# Patient Record
Sex: FEMALE | Race: WHITE | Employment: UNEMPLOYED | URBAN - METROPOLITAN AREA
[De-identification: names, ages, dates, MRNs, and addresses within clinical notes are randomized per-mention and may not be internally consistent; named-entity substitution may affect disease eponyms.]

---

## 2018-07-07 ENCOUNTER — HOSPITAL ENCOUNTER (EMERGENCY)
Age: 63
Discharge: HOME | End: 2018-07-07
Payer: COMMERCIAL

## 2018-07-07 ENCOUNTER — APPOINTMENT (OUTPATIENT)
Dept: CT IMAGING | Age: 63
End: 2018-07-07
Payer: COMMERCIAL

## 2018-07-07 VITALS
HEART RATE: 100 BPM | RESPIRATION RATE: 14 BRPM | TEMPERATURE: 98.5 F | SYSTOLIC BLOOD PRESSURE: 149 MMHG | DIASTOLIC BLOOD PRESSURE: 83 MMHG | HEIGHT: 62 IN | BODY MASS INDEX: 30.36 KG/M2 | WEIGHT: 165 LBS | OXYGEN SATURATION: 98 %

## 2018-07-07 DIAGNOSIS — S01.01XA LACERATION OF SCALP WITHOUT FOREIGN BODY, INITIAL ENCOUNTER: ICD-10-CM

## 2018-07-07 DIAGNOSIS — S09.90XA INJURY OF HEAD, INITIAL ENCOUNTER: Primary | ICD-10-CM

## 2018-07-07 PROCEDURE — 99283 EMERGENCY DEPT VISIT LOW MDM: CPT

## 2018-07-07 PROCEDURE — 6370000000 HC RX 637 (ALT 250 FOR IP): Performed by: NURSE PRACTITIONER

## 2018-07-07 PROCEDURE — 12001 RPR S/N/AX/GEN/TRNK 2.5CM/<: CPT

## 2018-07-07 PROCEDURE — 72125 CT NECK SPINE W/O DYE: CPT

## 2018-07-07 PROCEDURE — 90715 TDAP VACCINE 7 YRS/> IM: CPT | Performed by: NURSE PRACTITIONER

## 2018-07-07 PROCEDURE — 70450 CT HEAD/BRAIN W/O DYE: CPT

## 2018-07-07 PROCEDURE — 6360000002 HC RX W HCPCS: Performed by: NURSE PRACTITIONER

## 2018-07-07 PROCEDURE — 90471 IMMUNIZATION ADMIN: CPT | Performed by: NURSE PRACTITIONER

## 2018-07-07 RX ORDER — ONDANSETRON 4 MG/1
4 TABLET, ORALLY DISINTEGRATING ORAL EVERY 8 HOURS PRN
Qty: 24 TABLET | Refills: 0 | Status: SHIPPED | OUTPATIENT
Start: 2018-07-07 | End: 2019-08-29

## 2018-07-07 RX ORDER — OXYCODONE HYDROCHLORIDE AND ACETAMINOPHEN 5; 325 MG/1; MG/1
1 TABLET ORAL EVERY 6 HOURS PRN
Qty: 6 TABLET | Refills: 0 | Status: SHIPPED | OUTPATIENT
Start: 2018-07-07 | End: 2018-07-10

## 2018-07-07 RX ORDER — ONDANSETRON 4 MG/1
8 TABLET, ORALLY DISINTEGRATING ORAL ONCE
Status: COMPLETED | OUTPATIENT
Start: 2018-07-07 | End: 2018-07-07

## 2018-07-07 RX ORDER — CEPHALEXIN 500 MG/1
500 CAPSULE ORAL 4 TIMES DAILY
Qty: 40 CAPSULE | Refills: 0 | Status: SHIPPED | OUTPATIENT
Start: 2018-07-07 | End: 2020-05-09

## 2018-07-07 RX ORDER — OXYCODONE HYDROCHLORIDE AND ACETAMINOPHEN 5; 325 MG/1; MG/1
1 TABLET ORAL ONCE
Status: COMPLETED | OUTPATIENT
Start: 2018-07-07 | End: 2018-07-07

## 2018-07-07 RX ORDER — GINSENG 100 MG
CAPSULE ORAL ONCE
Status: COMPLETED | OUTPATIENT
Start: 2018-07-07 | End: 2018-07-07

## 2018-07-07 RX ORDER — CEPHALEXIN 500 MG/1
500 CAPSULE ORAL ONCE
Status: COMPLETED | OUTPATIENT
Start: 2018-07-07 | End: 2018-07-07

## 2018-07-07 RX ADMIN — BACITRACIN: 500 OINTMENT TOPICAL at 20:19

## 2018-07-07 RX ADMIN — TETANUS TOXOID, REDUCED DIPHTHERIA TOXOID AND ACELLULAR PERTUSSIS VACCINE, ADSORBED 0.5 ML: 5; 2.5; 8; 8; 2.5 SUSPENSION INTRAMUSCULAR at 20:20

## 2018-07-07 RX ADMIN — ONDANSETRON 8 MG: 4 TABLET, ORALLY DISINTEGRATING ORAL at 20:19

## 2018-07-07 RX ADMIN — CEPHALEXIN 500 MG: 500 CAPSULE ORAL at 20:19

## 2018-07-07 RX ADMIN — OXYCODONE HYDROCHLORIDE AND ACETAMINOPHEN 1 TABLET: 5; 325 TABLET ORAL at 20:19

## 2018-07-07 ASSESSMENT — PAIN SCALES - GENERAL
PAINLEVEL_OUTOF10: 4
PAINLEVEL_OUTOF10: 2
PAINLEVEL_OUTOF10: 2

## 2018-07-07 ASSESSMENT — PAIN DESCRIPTION - PAIN TYPE: TYPE: ACUTE PAIN

## 2018-07-07 ASSESSMENT — PAIN DESCRIPTION - LOCATION: LOCATION: HEAD

## 2018-07-08 NOTE — ED PROVIDER NOTES
------------------------- NURSING NOTES AND VITALS REVIEWED ---------------------------   The nursing notes within the ED encounter and vital signs as below have been reviewed. BP (!) 149/83   Pulse 100   Temp 98.5 °F (36.9 °C) (Oral)   Resp 14   Ht 5' 2\" (1.575 m)   Wt 165 lb (74.8 kg)   SpO2 98%   BMI 30.18 kg/m²   Oxygen Saturation Interpretation: Normal      ---------------------------------------------------PHYSICAL EXAM--------------------------------------      Constitutional/General: Alert and oriented x3, well appearing, non toxic in NAD  Head: Normocephalic and atraumatic  Eyes: PERRL, EOMI  Mouth: Oropharynx clear, handling secretions, no trismus  Neck: Supple, full ROM,   Pulmonary: Lungs clear to auscultation bilaterally, no wheezes, rales, or rhonchi. Not in respiratory distress  Cardiovascular:  Regular rate and rhythm, no murmurs, gallops, or rubs. 2+ distal pulses  Abdomen: Soft, non tender, non distended,   Extremities: Moves all extremities x 4. Warm and well perfused  Skin: warm and dry without rash, patient with 1 cm laceration to the very top of scalp. Aggressive wound care completed by nursing staff. Neurologic: GCS 15, cranial nerves II through XII grossly intact. No acute neurovascular deficit noted. Speech clear and coherent strength strong and equal bilaterally  Psych: Normal Affect    PROCEDURE NOTE  7/7/18       Time: 2130    LACERATION REPAIR  Risks, benefits and alternatives (for applicable procedures below) described. Performed By: TAMARA Rincon - CNP. Laceration #: 1. Location: Top of scalp  Length: 1cm. The wound area was irrigated with sterile saline, cleansend with shur-clens and draped in a sterile fashion. Local Anesthesia:  not required. The wound was explored with the following results:  No foreign bodies found, no foreign body or tendon injury seen. Debridement: partial thickness and None. Undermining: partial thickness and None.   Wound answered at this time and they are agreeable with the plan.      --------------------------------- IMPRESSION AND DISPOSITION ---------------------------------    IMPRESSION  1. Injury of head, initial encounter    2. Laceration of scalp without foreign body, initial encounter        DISPOSITION  Disposition: Discharge to home  Patient condition is good      NOTE: This report was transcribed using voice recognition software.  Every effort was made to ensure accuracy; however, inadvertent computerized transcription errors may be present     Yousuf Ang, TAMARA - YONI  07/08/18 [id number]

## 2019-01-16 ENCOUNTER — HOSPITAL ENCOUNTER (EMERGENCY)
Age: 64
Discharge: HOME | End: 2019-01-16
Attending: EMERGENCY MEDICINE
Payer: COMMERCIAL

## 2019-01-16 VITALS
HEART RATE: 84 BPM | BODY MASS INDEX: 30.36 KG/M2 | HEIGHT: 62 IN | RESPIRATION RATE: 16 BRPM | WEIGHT: 165 LBS | SYSTOLIC BLOOD PRESSURE: 131 MMHG | TEMPERATURE: 98.1 F | DIASTOLIC BLOOD PRESSURE: 59 MMHG | OXYGEN SATURATION: 99 %

## 2019-01-16 DIAGNOSIS — R07.9 CHEST PAIN, UNSPECIFIED TYPE: ICD-10-CM

## 2019-01-16 DIAGNOSIS — R11.2 NON-INTRACTABLE VOMITING WITH NAUSEA, UNSPECIFIED VOMITING TYPE: Primary | ICD-10-CM

## 2019-01-16 LAB
ALBUMIN SERPL-MCNC: 4.7 G/DL (ref 3.5–5.2)
ALP BLD-CCNC: 73 U/L (ref 35–104)
ALT SERPL-CCNC: 14 U/L (ref 0–32)
ANION GAP SERPL CALCULATED.3IONS-SCNC: 13 MMOL/L (ref 7–16)
AST SERPL-CCNC: 15 U/L (ref 0–31)
BACTERIA: NORMAL /HPF
BASOPHILS ABSOLUTE: 0.04 E9/L (ref 0–0.2)
BASOPHILS RELATIVE PERCENT: 0.4 % (ref 0–2)
BILIRUB SERPL-MCNC: 0.8 MG/DL (ref 0–1.2)
BILIRUBIN URINE: NEGATIVE
BLOOD, URINE: NORMAL
BUN BLDV-MCNC: 13 MG/DL (ref 8–23)
CALCIUM SERPL-MCNC: 10 MG/DL (ref 8.6–10.2)
CHLORIDE BLD-SCNC: 104 MMOL/L (ref 98–107)
CLARITY: CLEAR
CO2: 23 MMOL/L (ref 22–29)
COLOR: YELLOW
CREAT SERPL-MCNC: 0.7 MG/DL (ref 0.5–1)
EKG ATRIAL RATE: 90 BPM
EKG P AXIS: 41 DEGREES
EKG P-R INTERVAL: 162 MS
EKG Q-T INTERVAL: 392 MS
EKG QRS DURATION: 82 MS
EKG QTC CALCULATION (BAZETT): 479 MS
EKG R AXIS: 35 DEGREES
EKG T AXIS: 19 DEGREES
EKG VENTRICULAR RATE: 90 BPM
EOSINOPHILS ABSOLUTE: 0.05 E9/L (ref 0.05–0.5)
EOSINOPHILS RELATIVE PERCENT: 0.5 % (ref 0–6)
GFR AFRICAN AMERICAN: >60
GFR NON-AFRICAN AMERICAN: >60 ML/MIN/1.73
GLUCOSE BLD-MCNC: 75 MG/DL (ref 74–99)
GLUCOSE URINE: NEGATIVE MG/DL
HCT VFR BLD CALC: 52.5 % (ref 34–48)
HEMOGLOBIN: 17.6 G/DL (ref 11.5–15.5)
IMMATURE GRANULOCYTES #: 0.03 E9/L
IMMATURE GRANULOCYTES %: 0.3 % (ref 0–5)
KETONES, URINE: NEGATIVE MG/DL
LEUKOCYTE ESTERASE, URINE: NEGATIVE
LIPASE: 41 U/L (ref 13–60)
LYMPHOCYTES ABSOLUTE: 2.3 E9/L (ref 1.5–4)
LYMPHOCYTES RELATIVE PERCENT: 25.3 % (ref 20–42)
MCH RBC QN AUTO: 30.8 PG (ref 26–35)
MCHC RBC AUTO-ENTMCNC: 33.5 % (ref 32–34.5)
MCV RBC AUTO: 91.9 FL (ref 80–99.9)
MONOCYTES ABSOLUTE: 0.83 E9/L (ref 0.1–0.95)
MONOCYTES RELATIVE PERCENT: 9.1 % (ref 2–12)
NEUTROPHILS ABSOLUTE: 5.85 E9/L (ref 1.8–7.3)
NEUTROPHILS RELATIVE PERCENT: 64.4 % (ref 43–80)
NITRITE, URINE: NEGATIVE
PDW BLD-RTO: 11.9 FL (ref 11.5–15)
PH UA: 6 (ref 5–9)
PLATELET # BLD: 319 E9/L (ref 130–450)
PMV BLD AUTO: 9.5 FL (ref 7–12)
POTASSIUM SERPL-SCNC: 4.1 MMOL/L (ref 3.5–5)
PROTEIN UA: NEGATIVE MG/DL
RBC # BLD: 5.71 E12/L (ref 3.5–5.5)
RBC UA: NORMAL /HPF (ref 0–2)
RENAL EPITHELIAL, UA: NORMAL /HPF
SODIUM BLD-SCNC: 140 MMOL/L (ref 132–146)
SPECIFIC GRAVITY UA: <=1.005 (ref 1–1.03)
TOTAL PROTEIN: 7.9 G/DL (ref 6.4–8.3)
TROPONIN: <0.01 NG/ML (ref 0–0.03)
UROBILINOGEN, URINE: 0.2 E.U./DL
WBC # BLD: 9.1 E9/L (ref 4.5–11.5)
WBC UA: NORMAL /HPF (ref 0–5)

## 2019-01-16 PROCEDURE — 96375 TX/PRO/DX INJ NEW DRUG ADDON: CPT

## 2019-01-16 PROCEDURE — 83690 ASSAY OF LIPASE: CPT

## 2019-01-16 PROCEDURE — 85025 COMPLETE CBC W/AUTO DIFF WBC: CPT

## 2019-01-16 PROCEDURE — 2580000003 HC RX 258: Performed by: EMERGENCY MEDICINE

## 2019-01-16 PROCEDURE — 6370000000 HC RX 637 (ALT 250 FOR IP): Performed by: EMERGENCY MEDICINE

## 2019-01-16 PROCEDURE — 96374 THER/PROPH/DIAG INJ IV PUSH: CPT

## 2019-01-16 PROCEDURE — 99284 EMERGENCY DEPT VISIT MOD MDM: CPT

## 2019-01-16 PROCEDURE — 80053 COMPREHEN METABOLIC PANEL: CPT

## 2019-01-16 PROCEDURE — 6360000002 HC RX W HCPCS: Performed by: EMERGENCY MEDICINE

## 2019-01-16 PROCEDURE — C9113 INJ PANTOPRAZOLE SODIUM, VIA: HCPCS | Performed by: EMERGENCY MEDICINE

## 2019-01-16 PROCEDURE — 84484 ASSAY OF TROPONIN QUANT: CPT

## 2019-01-16 PROCEDURE — 81001 URINALYSIS AUTO W/SCOPE: CPT

## 2019-01-16 RX ORDER — DOCUSATE SODIUM 100 MG/1
100 CAPSULE, LIQUID FILLED ORAL 2 TIMES DAILY
Qty: 30 CAPSULE | Refills: 0 | Status: SHIPPED | OUTPATIENT
Start: 2019-01-16 | End: 2020-05-09

## 2019-01-16 RX ORDER — ONDANSETRON 2 MG/ML
4 INJECTION INTRAMUSCULAR; INTRAVENOUS ONCE
Status: COMPLETED | OUTPATIENT
Start: 2019-01-16 | End: 2019-01-16

## 2019-01-16 RX ORDER — METOCLOPRAMIDE 10 MG/1
10 TABLET ORAL 4 TIMES DAILY
Qty: 20 TABLET | Refills: 0 | Status: SHIPPED | OUTPATIENT
Start: 2019-01-16 | End: 2020-05-09

## 2019-01-16 RX ORDER — 0.9 % SODIUM CHLORIDE 0.9 %
1000 INTRAVENOUS SOLUTION INTRAVENOUS ONCE
Status: COMPLETED | OUTPATIENT
Start: 2019-01-16 | End: 2019-01-16

## 2019-01-16 RX ORDER — PANTOPRAZOLE SODIUM 40 MG/10ML
40 INJECTION, POWDER, LYOPHILIZED, FOR SOLUTION INTRAVENOUS ONCE
Status: COMPLETED | OUTPATIENT
Start: 2019-01-16 | End: 2019-01-16

## 2019-01-16 RX ORDER — ASPIRIN 81 MG/1
324 TABLET, CHEWABLE ORAL ONCE
Status: COMPLETED | OUTPATIENT
Start: 2019-01-16 | End: 2019-01-16

## 2019-01-16 RX ADMIN — ONDANSETRON 4 MG: 2 INJECTION INTRAMUSCULAR; INTRAVENOUS at 18:01

## 2019-01-16 RX ADMIN — ASPIRIN 81 MG 324 MG: 81 TABLET ORAL at 18:55

## 2019-01-16 RX ADMIN — SODIUM CHLORIDE 1000 ML: 9 INJECTION, SOLUTION INTRAVENOUS at 18:01

## 2019-01-16 RX ADMIN — PANTOPRAZOLE SODIUM 40 MG: 40 INJECTION, POWDER, FOR SOLUTION INTRAVENOUS at 18:01

## 2019-01-16 ASSESSMENT — ENCOUNTER SYMPTOMS
ABDOMINAL PAIN: 0
SHORTNESS OF BREATH: 0
BACK PAIN: 0
COUGH: 0
BLOOD IN STOOL: 0
NAUSEA: 1
VOMITING: 1

## 2019-03-02 ENCOUNTER — APPOINTMENT (OUTPATIENT)
Dept: GENERAL RADIOLOGY | Age: 64
End: 2019-03-02
Payer: COMMERCIAL

## 2019-03-02 ENCOUNTER — HOSPITAL ENCOUNTER (EMERGENCY)
Age: 64
Discharge: HOME | End: 2019-03-02
Attending: EMERGENCY MEDICINE
Payer: COMMERCIAL

## 2019-03-02 VITALS
TEMPERATURE: 98 F | HEIGHT: 62 IN | RESPIRATION RATE: 16 BRPM | WEIGHT: 155 LBS | DIASTOLIC BLOOD PRESSURE: 87 MMHG | BODY MASS INDEX: 28.52 KG/M2 | OXYGEN SATURATION: 100 % | SYSTOLIC BLOOD PRESSURE: 130 MMHG | HEART RATE: 78 BPM

## 2019-03-02 DIAGNOSIS — Z87.19 HISTORY OF HIATAL HERNIA: ICD-10-CM

## 2019-03-02 DIAGNOSIS — K21.9 GASTROESOPHAGEAL REFLUX DISEASE, ESOPHAGITIS PRESENCE NOT SPECIFIED: Primary | ICD-10-CM

## 2019-03-02 LAB
ALBUMIN SERPL-MCNC: 4.1 G/DL (ref 3.5–5.2)
ALP BLD-CCNC: 62 U/L (ref 35–104)
ALT SERPL-CCNC: 14 U/L (ref 0–32)
ANION GAP SERPL CALCULATED.3IONS-SCNC: 13 MMOL/L (ref 7–16)
AST SERPL-CCNC: 13 U/L (ref 0–31)
BASOPHILS ABSOLUTE: 0.05 E9/L (ref 0–0.2)
BASOPHILS RELATIVE PERCENT: 0.4 % (ref 0–2)
BILIRUB SERPL-MCNC: 0.5 MG/DL (ref 0–1.2)
BILIRUBIN DIRECT: <0.2 MG/DL (ref 0–0.3)
BILIRUBIN, INDIRECT: NORMAL MG/DL (ref 0–1)
BUN BLDV-MCNC: 12 MG/DL (ref 8–23)
CALCIUM SERPL-MCNC: 9.3 MG/DL (ref 8.6–10.2)
CHLORIDE BLD-SCNC: 104 MMOL/L (ref 98–107)
CO2: 22 MMOL/L (ref 22–29)
CREAT SERPL-MCNC: 0.7 MG/DL (ref 0.5–1)
EOSINOPHILS ABSOLUTE: 0.04 E9/L (ref 0.05–0.5)
EOSINOPHILS RELATIVE PERCENT: 0.4 % (ref 0–6)
GFR AFRICAN AMERICAN: >60
GFR NON-AFRICAN AMERICAN: >60 ML/MIN/1.73
GLUCOSE BLD-MCNC: 99 MG/DL (ref 74–99)
HCT VFR BLD CALC: 45.1 % (ref 34–48)
HEMOGLOBIN: 15.3 G/DL (ref 11.5–15.5)
IMMATURE GRANULOCYTES #: 0.04 E9/L
IMMATURE GRANULOCYTES %: 0.4 % (ref 0–5)
LIPASE: 35 U/L (ref 13–60)
LYMPHOCYTES ABSOLUTE: 1.69 E9/L (ref 1.5–4)
LYMPHOCYTES RELATIVE PERCENT: 15.1 % (ref 20–42)
MCH RBC QN AUTO: 31 PG (ref 26–35)
MCHC RBC AUTO-ENTMCNC: 33.9 % (ref 32–34.5)
MCV RBC AUTO: 91.5 FL (ref 80–99.9)
MONOCYTES ABSOLUTE: 0.65 E9/L (ref 0.1–0.95)
MONOCYTES RELATIVE PERCENT: 5.8 % (ref 2–12)
NEUTROPHILS ABSOLUTE: 8.74 E9/L (ref 1.8–7.3)
NEUTROPHILS RELATIVE PERCENT: 77.9 % (ref 43–80)
PDW BLD-RTO: 12.1 FL (ref 11.5–15)
PLATELET # BLD: 250 E9/L (ref 130–450)
PMV BLD AUTO: 9.1 FL (ref 7–12)
POTASSIUM SERPL-SCNC: 3.7 MMOL/L (ref 3.5–5)
RBC # BLD: 4.93 E12/L (ref 3.5–5.5)
SODIUM BLD-SCNC: 139 MMOL/L (ref 132–146)
TOTAL PROTEIN: 6.7 G/DL (ref 6.4–8.3)
TROPONIN: <0.01 NG/ML (ref 0–0.03)
WBC # BLD: 11.2 E9/L (ref 4.5–11.5)

## 2019-03-02 PROCEDURE — 6360000002 HC RX W HCPCS: Performed by: STUDENT IN AN ORGANIZED HEALTH CARE EDUCATION/TRAINING PROGRAM

## 2019-03-02 PROCEDURE — 96375 TX/PRO/DX INJ NEW DRUG ADDON: CPT

## 2019-03-02 PROCEDURE — 99284 EMERGENCY DEPT VISIT MOD MDM: CPT

## 2019-03-02 PROCEDURE — 84484 ASSAY OF TROPONIN QUANT: CPT

## 2019-03-02 PROCEDURE — 6370000000 HC RX 637 (ALT 250 FOR IP): Performed by: STUDENT IN AN ORGANIZED HEALTH CARE EDUCATION/TRAINING PROGRAM

## 2019-03-02 PROCEDURE — 96374 THER/PROPH/DIAG INJ IV PUSH: CPT

## 2019-03-02 PROCEDURE — 2580000003 HC RX 258: Performed by: STUDENT IN AN ORGANIZED HEALTH CARE EDUCATION/TRAINING PROGRAM

## 2019-03-02 PROCEDURE — 85025 COMPLETE CBC W/AUTO DIFF WBC: CPT

## 2019-03-02 PROCEDURE — 71045 X-RAY EXAM CHEST 1 VIEW: CPT

## 2019-03-02 PROCEDURE — 80048 BASIC METABOLIC PNL TOTAL CA: CPT

## 2019-03-02 PROCEDURE — 80076 HEPATIC FUNCTION PANEL: CPT

## 2019-03-02 PROCEDURE — 83690 ASSAY OF LIPASE: CPT

## 2019-03-02 RX ORDER — PROMETHAZINE HYDROCHLORIDE 25 MG/ML
12.5 INJECTION, SOLUTION INTRAMUSCULAR; INTRAVENOUS ONCE
Status: COMPLETED | OUTPATIENT
Start: 2019-03-02 | End: 2019-03-02

## 2019-03-02 RX ORDER — PROMETHAZINE HYDROCHLORIDE 25 MG/1
25 TABLET ORAL EVERY 6 HOURS PRN
Qty: 10 TABLET | Refills: 0 | Status: SHIPPED | OUTPATIENT
Start: 2019-03-02 | End: 2019-03-05

## 2019-03-02 RX ORDER — 0.9 % SODIUM CHLORIDE 0.9 %
1000 INTRAVENOUS SOLUTION INTRAVENOUS ONCE
Status: COMPLETED | OUTPATIENT
Start: 2019-03-02 | End: 2019-03-02

## 2019-03-02 RX ORDER — ONDANSETRON 2 MG/ML
8 INJECTION INTRAMUSCULAR; INTRAVENOUS ONCE
Status: COMPLETED | OUTPATIENT
Start: 2019-03-02 | End: 2019-03-02

## 2019-03-02 RX ADMIN — PROMETHAZINE HYDROCHLORIDE 12.5 MG: 25 INJECTION INTRAMUSCULAR; INTRAVENOUS at 19:50

## 2019-03-02 RX ADMIN — SODIUM CHLORIDE 1000 ML: 9 INJECTION, SOLUTION INTRAVENOUS at 18:33

## 2019-03-02 RX ADMIN — LIDOCAINE HYDROCHLORIDE: 20 SOLUTION ORAL; TOPICAL at 18:33

## 2019-03-02 RX ADMIN — ONDANSETRON 8 MG: 2 INJECTION INTRAMUSCULAR; INTRAVENOUS at 18:33

## 2019-03-03 ASSESSMENT — ENCOUNTER SYMPTOMS
SORE THROAT: 0
EYE PAIN: 0
NAUSEA: 1
DIARRHEA: 0
SHORTNESS OF BREATH: 0
SINUS PRESSURE: 0
BACK PAIN: 0
COUGH: 0
BLOOD IN STOOL: 0
ABDOMINAL DISTENTION: 0
VOMITING: 1
WHEEZING: 0
EYE REDNESS: 0
RHINORRHEA: 0
CONSTIPATION: 0
TROUBLE SWALLOWING: 0
ABDOMINAL PAIN: 1
CHEST TIGHTNESS: 0
PHOTOPHOBIA: 0

## 2019-03-10 LAB
EKG ATRIAL RATE: 83 BPM
EKG P AXIS: 41 DEGREES
EKG P-R INTERVAL: 166 MS
EKG Q-T INTERVAL: 384 MS
EKG QRS DURATION: 82 MS
EKG QTC CALCULATION (BAZETT): 451 MS
EKG R AXIS: 0 DEGREES
EKG T AXIS: 26 DEGREES
EKG VENTRICULAR RATE: 83 BPM

## 2019-05-07 ENCOUNTER — HOSPITAL ENCOUNTER (OUTPATIENT)
Age: 64
Discharge: HOME | End: 2019-05-07
Payer: COMMERCIAL

## 2019-05-07 PROCEDURE — 86003 ALLG SPEC IGE CRUDE XTRC EA: CPT

## 2019-05-19 LAB
Lab: NORMAL
REPORT: NORMAL
THIS TEST SENT TO: NORMAL

## 2019-07-12 ENCOUNTER — HOSPITAL ENCOUNTER (EMERGENCY)
Age: 64
Discharge: HOME | End: 2019-07-13
Attending: EMERGENCY MEDICINE
Payer: COMMERCIAL

## 2019-07-12 ENCOUNTER — APPOINTMENT (OUTPATIENT)
Dept: CT IMAGING | Age: 64
End: 2019-07-12
Payer: COMMERCIAL

## 2019-07-12 VITALS
HEART RATE: 99 BPM | RESPIRATION RATE: 16 BRPM | DIASTOLIC BLOOD PRESSURE: 66 MMHG | TEMPERATURE: 98.4 F | WEIGHT: 170 LBS | HEIGHT: 62 IN | SYSTOLIC BLOOD PRESSURE: 134 MMHG | OXYGEN SATURATION: 99 % | BODY MASS INDEX: 31.28 KG/M2

## 2019-07-12 DIAGNOSIS — R10.9 ABDOMINAL PAIN, UNSPECIFIED ABDOMINAL LOCATION: Primary | ICD-10-CM

## 2019-07-12 DIAGNOSIS — N83.201 CYST OF RIGHT OVARY: ICD-10-CM

## 2019-07-12 LAB
ALBUMIN SERPL-MCNC: 4.3 G/DL (ref 3.5–5.2)
ALP BLD-CCNC: 76 U/L (ref 35–104)
ALT SERPL-CCNC: 25 U/L (ref 0–32)
ANION GAP SERPL CALCULATED.3IONS-SCNC: 15 MMOL/L (ref 7–16)
AST SERPL-CCNC: 16 U/L (ref 0–31)
ATYPICAL LYMPHOCYTE RELATIVE PERCENT: 2 % (ref 0–4)
BASOPHILS ABSOLUTE: 0 E9/L (ref 0–0.2)
BASOPHILS RELATIVE PERCENT: 0 % (ref 0–2)
BILIRUB SERPL-MCNC: 0.8 MG/DL (ref 0–1.2)
BILIRUBIN URINE: NEGATIVE
BLOOD, URINE: NEGATIVE
BUN BLDV-MCNC: 10 MG/DL (ref 8–23)
CALCIUM SERPL-MCNC: 9.8 MG/DL (ref 8.6–10.2)
CHLORIDE BLD-SCNC: 102 MMOL/L (ref 98–107)
CLARITY: CLEAR
CO2: 21 MMOL/L (ref 22–29)
COLOR: YELLOW
CREAT SERPL-MCNC: 0.6 MG/DL (ref 0.5–1)
EOSINOPHILS ABSOLUTE: 0 E9/L (ref 0.05–0.5)
EOSINOPHILS RELATIVE PERCENT: 0 % (ref 0–6)
GFR AFRICAN AMERICAN: >60
GFR NON-AFRICAN AMERICAN: >60 ML/MIN/1.73
GLUCOSE BLD-MCNC: 104 MG/DL (ref 74–99)
GLUCOSE URINE: NEGATIVE MG/DL
HCT VFR BLD CALC: 45.6 % (ref 34–48)
HEMOGLOBIN: 15.3 G/DL (ref 11.5–15.5)
KETONES, URINE: 15 MG/DL
LEUKOCYTE ESTERASE, URINE: NEGATIVE
LIPASE: 25 U/L (ref 13–60)
LYMPHOCYTES ABSOLUTE: 0.58 E9/L (ref 1.5–4)
LYMPHOCYTES RELATIVE PERCENT: 4 % (ref 20–42)
MCH RBC QN AUTO: 31 PG (ref 26–35)
MCHC RBC AUTO-ENTMCNC: 33.6 % (ref 32–34.5)
MCV RBC AUTO: 92.5 FL (ref 80–99.9)
MONOCYTES ABSOLUTE: 0.1 E9/L (ref 0.1–0.95)
MONOCYTES RELATIVE PERCENT: 1 % (ref 2–12)
NEUTROPHILS ABSOLUTE: 9.02 E9/L (ref 1.8–7.3)
NEUTROPHILS RELATIVE PERCENT: 93 % (ref 43–80)
NITRITE, URINE: NEGATIVE
PDW BLD-RTO: 12.2 FL (ref 11.5–15)
PH UA: 7.5 (ref 5–9)
PLATELET # BLD: 227 E9/L (ref 130–450)
PMV BLD AUTO: 9.2 FL (ref 7–12)
POTASSIUM SERPL-SCNC: 3.7 MMOL/L (ref 3.5–5)
PROTEIN UA: NEGATIVE MG/DL
RBC # BLD: 4.93 E12/L (ref 3.5–5.5)
RBC # BLD: NORMAL 10*6/UL
SODIUM BLD-SCNC: 138 MMOL/L (ref 132–146)
SPECIFIC GRAVITY UA: 1.01 (ref 1–1.03)
TOTAL PROTEIN: 7.2 G/DL (ref 6.4–8.3)
TROPONIN: <0.01 NG/ML (ref 0–0.03)
UROBILINOGEN, URINE: 0.2 E.U./DL
WBC # BLD: 9.7 E9/L (ref 4.5–11.5)

## 2019-07-12 PROCEDURE — 99284 EMERGENCY DEPT VISIT MOD MDM: CPT

## 2019-07-12 PROCEDURE — 80053 COMPREHEN METABOLIC PANEL: CPT

## 2019-07-12 PROCEDURE — 6370000000 HC RX 637 (ALT 250 FOR IP): Performed by: EMERGENCY MEDICINE

## 2019-07-12 PROCEDURE — 85025 COMPLETE CBC W/AUTO DIFF WBC: CPT

## 2019-07-12 PROCEDURE — 81003 URINALYSIS AUTO W/O SCOPE: CPT

## 2019-07-12 PROCEDURE — 74177 CT ABD & PELVIS W/CONTRAST: CPT

## 2019-07-12 PROCEDURE — 83690 ASSAY OF LIPASE: CPT

## 2019-07-12 PROCEDURE — 84484 ASSAY OF TROPONIN QUANT: CPT

## 2019-07-12 PROCEDURE — 6360000002 HC RX W HCPCS: Performed by: EMERGENCY MEDICINE

## 2019-07-12 PROCEDURE — 6360000004 HC RX CONTRAST MEDICATION: Performed by: RADIOLOGY

## 2019-07-12 PROCEDURE — 2580000003 HC RX 258: Performed by: EMERGENCY MEDICINE

## 2019-07-12 PROCEDURE — 96374 THER/PROPH/DIAG INJ IV PUSH: CPT

## 2019-07-12 RX ORDER — SODIUM CHLORIDE 0.9 % (FLUSH) 0.9 %
SYRINGE (ML) INJECTION
Status: DISCONTINUED
Start: 2019-07-12 | End: 2019-07-13 | Stop reason: HOSPADM

## 2019-07-12 RX ORDER — ONDANSETRON 2 MG/ML
4 INJECTION INTRAMUSCULAR; INTRAVENOUS ONCE
Status: COMPLETED | OUTPATIENT
Start: 2019-07-12 | End: 2019-07-12

## 2019-07-12 RX ORDER — SODIUM CHLORIDE 0.9 % (FLUSH) 0.9 %
10 SYRINGE (ML) INJECTION ONCE
Status: DISCONTINUED | OUTPATIENT
Start: 2019-07-12 | End: 2019-07-13 | Stop reason: HOSPADM

## 2019-07-12 RX ORDER — 0.9 % SODIUM CHLORIDE 0.9 %
1000 INTRAVENOUS SOLUTION INTRAVENOUS ONCE
Status: COMPLETED | OUTPATIENT
Start: 2019-07-12 | End: 2019-07-12

## 2019-07-12 RX ORDER — LIDOCAINE HYDROCHLORIDE 20 MG/ML
SOLUTION OROPHARYNGEAL
Status: DISCONTINUED
Start: 2019-07-12 | End: 2019-07-13 | Stop reason: HOSPADM

## 2019-07-12 RX ORDER — MAGNESIUM HYDROXIDE/ALUMINUM HYDROXICE/SIMETHICONE 120; 1200; 1200 MG/30ML; MG/30ML; MG/30ML
SUSPENSION ORAL
Status: DISCONTINUED
Start: 2019-07-12 | End: 2019-07-13 | Stop reason: HOSPADM

## 2019-07-12 RX ADMIN — IOPAMIDOL 110 ML: 755 INJECTION, SOLUTION INTRAVENOUS at 22:39

## 2019-07-12 RX ADMIN — ONDANSETRON 4 MG: 2 INJECTION INTRAMUSCULAR; INTRAVENOUS at 21:41

## 2019-07-12 RX ADMIN — SODIUM CHLORIDE 1000 ML: 9 INJECTION, SOLUTION INTRAVENOUS at 21:40

## 2019-07-12 RX ADMIN — LIDOCAINE HYDROCHLORIDE: 20 SOLUTION ORAL; TOPICAL at 22:57

## 2019-07-12 ASSESSMENT — PAIN DESCRIPTION - PAIN TYPE: TYPE: ACUTE PAIN

## 2019-07-12 ASSESSMENT — PAIN SCALES - GENERAL: PAINLEVEL_OUTOF10: 7

## 2019-07-12 ASSESSMENT — PAIN DESCRIPTION - LOCATION: LOCATION: ABDOMEN

## 2019-07-12 ASSESSMENT — PAIN DESCRIPTION - FREQUENCY: FREQUENCY: INTERMITTENT

## 2019-07-12 ASSESSMENT — PAIN DESCRIPTION - DESCRIPTORS: DESCRIPTORS: ACHING

## 2019-07-13 RX ORDER — DIPHENHYDRAMINE HYDROCHLORIDE 50 MG/ML
INJECTION INTRAMUSCULAR; INTRAVENOUS
Status: DISCONTINUED
Start: 2019-07-13 | End: 2019-07-13 | Stop reason: HOSPADM

## 2019-07-13 RX ORDER — ONDANSETRON 2 MG/ML
INJECTION INTRAMUSCULAR; INTRAVENOUS
Status: DISCONTINUED
Start: 2019-07-13 | End: 2019-07-13 | Stop reason: HOSPADM

## 2019-07-13 RX ORDER — KETOROLAC TROMETHAMINE 30 MG/ML
INJECTION, SOLUTION INTRAMUSCULAR; INTRAVENOUS
Status: DISCONTINUED
Start: 2019-07-13 | End: 2019-07-13 | Stop reason: HOSPADM

## 2019-07-13 RX ORDER — PROCHLORPERAZINE EDISYLATE 5 MG/ML
INJECTION INTRAMUSCULAR; INTRAVENOUS
Status: DISCONTINUED
Start: 2019-07-13 | End: 2019-07-13 | Stop reason: HOSPADM

## 2019-07-13 NOTE — ED PROVIDER NOTES
DISPOSITION ---------------------------------    IMPRESSION  1. Abdominal pain, unspecified abdominal location Stable   2.  Cyst of right ovary New Problem       DISPOSITION  Disposition: Discharge to home  Patient condition is stable      Patient is to follow-up with Dr. Rola Mendez Monday morning            Ruben Merida MD  07/14/19 [id number]

## 2019-07-15 ENCOUNTER — HOSPITAL ENCOUNTER (OUTPATIENT)
Age: 64
Discharge: HOME | End: 2019-07-17
Payer: COMMERCIAL

## 2019-07-15 PROCEDURE — 86304 IMMUNOASSAY TUMOR CA 125: CPT

## 2019-07-15 PROCEDURE — 82378 CARCINOEMBRYONIC ANTIGEN: CPT

## 2019-07-15 PROCEDURE — 87624 HPV HI-RISK TYP POOLED RSLT: CPT

## 2019-07-15 PROCEDURE — G0123 SCREEN CERV/VAG THIN LAYER: HCPCS

## 2019-07-16 LAB
CA 125: 8.6 U/ML (ref 0–35)
CEA: 0.9 NG/ML (ref 0–5.2)

## 2019-07-19 LAB
HPV SAMPLE: NORMAL
HPV TYPE 16: NOT DETECTED
HPV TYPE 18: NOT DETECTED
HPV, HIGH RISK OTHER: NOT DETECTED
INTERPRETATION: NORMAL
SOURCE: NORMAL

## 2019-07-24 ENCOUNTER — HOSPITAL ENCOUNTER (OUTPATIENT)
Age: 64
Discharge: HOME | End: 2019-07-26
Payer: COMMERCIAL

## 2019-07-24 PROCEDURE — 88305 TISSUE EXAM BY PATHOLOGIST: CPT

## 2019-08-21 ENCOUNTER — HOSPITAL ENCOUNTER (OUTPATIENT)
Age: 64
Discharge: HOME | End: 2019-08-23

## 2019-08-21 PROCEDURE — 88307 TISSUE EXAM BY PATHOLOGIST: CPT

## 2019-08-21 PROCEDURE — 88305 TISSUE EXAM BY PATHOLOGIST: CPT

## 2019-08-29 ENCOUNTER — APPOINTMENT (OUTPATIENT)
Dept: CT IMAGING | Age: 64
End: 2019-08-29
Payer: COMMERCIAL

## 2019-08-29 ENCOUNTER — HOSPITAL ENCOUNTER (EMERGENCY)
Age: 64
Discharge: HOME | End: 2019-08-29
Attending: EMERGENCY MEDICINE
Payer: COMMERCIAL

## 2019-08-29 VITALS
HEART RATE: 76 BPM | OXYGEN SATURATION: 100 % | RESPIRATION RATE: 18 BRPM | SYSTOLIC BLOOD PRESSURE: 132 MMHG | TEMPERATURE: 98 F | DIASTOLIC BLOOD PRESSURE: 76 MMHG

## 2019-08-29 DIAGNOSIS — N28.89 KIDNEY MASS: ICD-10-CM

## 2019-08-29 DIAGNOSIS — L03.319 CELLULITIS OF TRUNK, UNSPECIFIED SITE OF TRUNK: Primary | ICD-10-CM

## 2019-08-29 DIAGNOSIS — K80.20 CALCULUS OF GALLBLADDER WITHOUT CHOLECYSTITIS WITHOUT OBSTRUCTION: ICD-10-CM

## 2019-08-29 LAB
ALBUMIN SERPL-MCNC: 4 G/DL (ref 3.5–5.2)
ALP BLD-CCNC: 83 U/L (ref 35–104)
ALT SERPL-CCNC: 41 U/L (ref 0–32)
ANION GAP SERPL CALCULATED.3IONS-SCNC: 11 MMOL/L (ref 7–16)
AST SERPL-CCNC: 16 U/L (ref 0–31)
BACTERIA: ABNORMAL /HPF
BASOPHILS ABSOLUTE: 0.02 E9/L (ref 0–0.2)
BASOPHILS RELATIVE PERCENT: 0.3 % (ref 0–2)
BILIRUB SERPL-MCNC: 0.7 MG/DL (ref 0–1.2)
BILIRUBIN URINE: NEGATIVE
BLOOD, URINE: ABNORMAL
BUN BLDV-MCNC: 9 MG/DL (ref 8–23)
CALCIUM SERPL-MCNC: 9.3 MG/DL (ref 8.6–10.2)
CHLORIDE BLD-SCNC: 110 MMOL/L (ref 98–107)
CLARITY: CLEAR
CO2: 23 MMOL/L (ref 22–29)
COLOR: YELLOW
CREAT SERPL-MCNC: 0.6 MG/DL (ref 0.5–1)
EOSINOPHILS ABSOLUTE: 0.06 E9/L (ref 0.05–0.5)
EOSINOPHILS RELATIVE PERCENT: 0.8 % (ref 0–6)
EPITHELIAL CELLS, UA: ABNORMAL /HPF
GFR AFRICAN AMERICAN: >60
GFR NON-AFRICAN AMERICAN: >60 ML/MIN/1.73
GLUCOSE BLD-MCNC: 89 MG/DL (ref 74–99)
GLUCOSE URINE: NEGATIVE MG/DL
HCT VFR BLD CALC: 44.5 % (ref 34–48)
HEMOGLOBIN: 14.8 G/DL (ref 11.5–15.5)
IMMATURE GRANULOCYTES #: 0.02 E9/L
IMMATURE GRANULOCYTES %: 0.3 % (ref 0–5)
KETONES, URINE: 15 MG/DL
LACTIC ACID, SEPSIS: 1.1 MMOL/L (ref 0.5–1.9)
LEUKOCYTE ESTERASE, URINE: ABNORMAL
LYMPHOCYTES ABSOLUTE: 1.79 E9/L (ref 1.5–4)
LYMPHOCYTES RELATIVE PERCENT: 24 % (ref 20–42)
MCH RBC QN AUTO: 30.8 PG (ref 26–35)
MCHC RBC AUTO-ENTMCNC: 33.3 % (ref 32–34.5)
MCV RBC AUTO: 92.5 FL (ref 80–99.9)
MONOCYTES ABSOLUTE: 0.52 E9/L (ref 0.1–0.95)
MONOCYTES RELATIVE PERCENT: 7 % (ref 2–12)
NEUTROPHILS ABSOLUTE: 5.05 E9/L (ref 1.8–7.3)
NEUTROPHILS RELATIVE PERCENT: 67.6 % (ref 43–80)
NITRITE, URINE: NEGATIVE
PDW BLD-RTO: 11.9 FL (ref 11.5–15)
PH UA: 7 (ref 5–9)
PLATELET # BLD: 258 E9/L (ref 130–450)
PMV BLD AUTO: 10 FL (ref 7–12)
POTASSIUM REFLEX MAGNESIUM: 4.5 MMOL/L (ref 3.5–5)
PROTEIN UA: NEGATIVE MG/DL
RBC # BLD: 4.81 E12/L (ref 3.5–5.5)
RBC UA: ABNORMAL /HPF (ref 0–2)
REASON FOR REJECTION: NORMAL
REJECTED TEST: NORMAL
SODIUM BLD-SCNC: 144 MMOL/L (ref 132–146)
SPECIFIC GRAVITY UA: 1.01 (ref 1–1.03)
TOTAL PROTEIN: 6.8 G/DL (ref 6.4–8.3)
UROBILINOGEN, URINE: 1 E.U./DL
WBC # BLD: 7.5 E9/L (ref 4.5–11.5)
WBC UA: ABNORMAL /HPF (ref 0–5)

## 2019-08-29 PROCEDURE — 81001 URINALYSIS AUTO W/SCOPE: CPT

## 2019-08-29 PROCEDURE — 85025 COMPLETE CBC W/AUTO DIFF WBC: CPT

## 2019-08-29 PROCEDURE — 96374 THER/PROPH/DIAG INJ IV PUSH: CPT

## 2019-08-29 PROCEDURE — 74177 CT ABD & PELVIS W/CONTRAST: CPT

## 2019-08-29 PROCEDURE — 83605 ASSAY OF LACTIC ACID: CPT

## 2019-08-29 PROCEDURE — 2580000003 HC RX 258: Performed by: STUDENT IN AN ORGANIZED HEALTH CARE EDUCATION/TRAINING PROGRAM

## 2019-08-29 PROCEDURE — 99284 EMERGENCY DEPT VISIT MOD MDM: CPT

## 2019-08-29 PROCEDURE — 2580000003 HC RX 258

## 2019-08-29 PROCEDURE — 80053 COMPREHEN METABOLIC PANEL: CPT

## 2019-08-29 PROCEDURE — 6360000004 HC RX CONTRAST MEDICATION: Performed by: RADIOLOGY

## 2019-08-29 PROCEDURE — 6360000002 HC RX W HCPCS: Performed by: STUDENT IN AN ORGANIZED HEALTH CARE EDUCATION/TRAINING PROGRAM

## 2019-08-29 RX ORDER — ONDANSETRON 4 MG/1
4 TABLET, ORALLY DISINTEGRATING ORAL EVERY 8 HOURS PRN
Qty: 12 TABLET | Refills: 0 | Status: ON HOLD | OUTPATIENT
Start: 2019-08-29 | End: 2020-05-15 | Stop reason: HOSPADM

## 2019-08-29 RX ORDER — SODIUM CHLORIDE 0.9 % (FLUSH) 0.9 %
SYRINGE (ML) INJECTION
Status: COMPLETED
Start: 2019-08-29 | End: 2019-08-29

## 2019-08-29 RX ORDER — 0.9 % SODIUM CHLORIDE 0.9 %
1000 INTRAVENOUS SOLUTION INTRAVENOUS ONCE
Status: COMPLETED | OUTPATIENT
Start: 2019-08-29 | End: 2019-08-29

## 2019-08-29 RX ORDER — CLINDAMYCIN HYDROCHLORIDE 300 MG/1
300 CAPSULE ORAL 3 TIMES DAILY
Qty: 21 CAPSULE | Refills: 0 | Status: SHIPPED | OUTPATIENT
Start: 2019-08-29 | End: 2019-09-05

## 2019-08-29 RX ORDER — ONDANSETRON 2 MG/ML
4 INJECTION INTRAMUSCULAR; INTRAVENOUS ONCE
Status: COMPLETED | OUTPATIENT
Start: 2019-08-29 | End: 2019-08-29

## 2019-08-29 RX ADMIN — SODIUM CHLORIDE 1000 ML: 9 INJECTION, SOLUTION INTRAVENOUS at 15:01

## 2019-08-29 RX ADMIN — Medication 10 ML: at 15:01

## 2019-08-29 RX ADMIN — ONDANSETRON 4 MG: 2 INJECTION INTRAMUSCULAR; INTRAVENOUS at 15:01

## 2019-08-29 RX ADMIN — IOPAMIDOL 110 ML: 755 INJECTION, SOLUTION INTRAVENOUS at 17:26

## 2019-08-29 ASSESSMENT — ENCOUNTER SYMPTOMS
DIARRHEA: 0
ABDOMINAL PAIN: 0
CONSTIPATION: 0
VOMITING: 1
EYE REDNESS: 0
SINUS PAIN: 0
SORE THROAT: 0
EYE PAIN: 0
SHORTNESS OF BREATH: 0
COUGH: 0
NAUSEA: 1

## 2019-08-29 ASSESSMENT — PAIN DESCRIPTION - FREQUENCY: FREQUENCY: CONTINUOUS

## 2019-08-29 ASSESSMENT — PAIN DESCRIPTION - PAIN TYPE: TYPE: ACUTE PAIN

## 2019-08-29 ASSESSMENT — PAIN DESCRIPTION - LOCATION: LOCATION: ABDOMEN

## 2019-08-29 ASSESSMENT — PAIN SCALES - GENERAL: PAINLEVEL_OUTOF10: 4

## 2019-08-29 ASSESSMENT — PAIN DESCRIPTION - DESCRIPTORS: DESCRIPTORS: TENDER

## 2019-08-29 NOTE — ED PROVIDER NOTES
Patient is a 37-year-old female presenting with abdominal pain, emesis, nausea. Patient states that on 21 August she had a right ovarian cyst removed via laparoscopy. Patient states she for the last 3 to 4 days has had nausea and vomiting. No blood in the vomit. She denies any abdominal pain but describes a abdominal discomfort over the right lower quadrant incision. Patient vomited last night at midnight. She denies any fever chills, bloody or tarry stools. She has not taken anything for symptoms and her symptoms are not made worse by anything. She is unsure when the erythema started on her right lower quadrant incision. Review of Systems   Constitutional: Negative for chills and fever. HENT: Negative for congestion, sinus pain and sore throat. Eyes: Negative for pain and redness. Respiratory: Negative for cough and shortness of breath. Cardiovascular: Negative for chest pain and palpitations. Gastrointestinal: Positive for nausea and vomiting. Negative for abdominal pain, constipation and diarrhea. Endocrine: Negative for polyuria. Genitourinary: Negative for difficulty urinating, dysuria, frequency and hematuria. Musculoskeletal: Negative for neck pain. Skin: Negative. Neurological: Negative for dizziness, weakness, light-headedness and headaches. Hematological: Negative. Psychiatric/Behavioral: Negative for agitation and confusion. Physical Exam   Constitutional: She appears well-developed and well-nourished. HENT:   Head: Normocephalic and atraumatic. Eyes: Pupils are equal, round, and reactive to light. Conjunctivae and EOM are normal.   Neck: Normal range of motion. No JVD present. Cardiovascular: Normal rate, regular rhythm, normal heart sounds and intact distal pulses. Pulmonary/Chest: Effort normal and breath sounds normal. No respiratory distress. She has no wheezes. Abdominal: Soft.  Bowel sounds are normal. She exhibits no distension and no mass. There is no tenderness. There is no guarding. Musculoskeletal: She exhibits no edema. Skin: Skin is warm and dry. Psychiatric: She has a normal mood and affect. Nursing note and vitals reviewed. Procedures     MDM     ED Course as of Aug 30 0144   Thu Aug 29, 2019   1819 1. Abdominal wall hematoma to the right of the midline. 2. Trace free fluid in the pelvis which may relate to previous  surgery. The appearance is not suggestive of abscess. 3. Very small solid lesion at the lower pole the right kidney which is  quite suspicious for neoplasm. Consider urologic surgery consultation. 4. Stable enhancing lesion in the right hepatic lobe which may  represent a benign hemangioma. 5. Cholelithiasis. 6. Sizable hiatus hernia. 2 probable duodenal diverticuli.    [WL]      ED Course User Index  [WL] Elen Gill DO      Patient presents to the ED for   Chief Complaint   Patient presents with    Abdominal Pain     since hysteretomy on 21st    Emesis     mucus    Nausea   . Chart review was performed. Workup in the ED revealed cellulitis of the trunk, kidney mass, cholelithiasis. Lab work was unremarkable, CT of abdomen and pelvis showed a kidney mass and cholelithiasis. Patient cellulitis was demonstrated clinically with erythema in her right lower quadrant of her abdomen. She was sent home with a prescription for clindamycin and Zofran and advised to follow-up with her OB/GYN  Patient continues to be non-toxic on re-evaluation. Findings were discussed with the patient and reasons to immediately return to the ED were articulated to them. They will follow-up with their OBGYN. ED Course as of Aug 30 0144   Thu Aug 29, 2019   1819 1. Abdominal wall hematoma to the right of the midline. 2. Trace free fluid in the pelvis which may relate to previous  surgery. The appearance is not suggestive of abscess.   3. Very small solid lesion at the lower pole the right kidney which - 0.50 E9/L    Basophils Absolute 0.02 0.00 - 0.20 E9/L   Lactate, Sepsis   Result Value Ref Range    Lactic Acid, Sepsis 1.1 0.5 - 1.9 mmol/L   Urinalysis with Microscopic   Result Value Ref Range    Color, UA Yellow Straw/Yellow    Clarity, UA Clear Clear    Glucose, Ur Negative Negative mg/dL    Bilirubin Urine Negative Negative    Ketones, Urine 15 (A) Negative mg/dL    Specific Gravity, UA 1.010 1.005 - 1.030    Blood, Urine TRACE-INTACT Negative    pH, UA 7.0 5.0 - 9.0    Protein, UA Negative Negative mg/dL    Urobilinogen, Urine 1.0 <2.0 E.U./dL    Nitrite, Urine Negative Negative    Leukocyte Esterase, Urine SMALL (A) Negative    WBC, UA 2-5 0 - 5 /HPF    RBC, UA NONE 0 - 2 /HPF    Epi Cells FEW /HPF    Bacteria, UA NONE /HPF   SPECIMEN REJECTION   Result Value Ref Range    Rejected Test CMPX     Reason for Rejection see below    Comprehensive Metabolic Panel w/ Reflex to MG   Result Value Ref Range    Sodium 144 132 - 146 mmol/L    Potassium reflex Magnesium 4.5 3.5 - 5.0 mmol/L    Chloride 110 (H) 98 - 107 mmol/L    CO2 23 22 - 29 mmol/L    Anion Gap 11 7 - 16 mmol/L    Glucose 89 74 - 99 mg/dL    BUN 9 8 - 23 mg/dL    CREATININE 0.6 0.5 - 1.0 mg/dL    GFR Non-African American >60 >=60 mL/min/1.73    GFR African American >60     Calcium 9.3 8.6 - 10.2 mg/dL    Total Protein 6.8 6.4 - 8.3 g/dL    Alb 4.0 3.5 - 5.2 g/dL    Total Bilirubin 0.7 0.0 - 1.2 mg/dL    Alkaline Phosphatase 83 35 - 104 U/L    ALT 41 (H) 0 - 32 U/L    AST 16 0 - 31 U/L       Radiology:  CT ABDOMEN PELVIS W IV CONTRAST Additional Contrast? None   Final Result      1. Abdominal wall hematoma to the right of the midline. 2. Trace free fluid in the pelvis which may relate to previous   surgery. The appearance is not suggestive of abscess. 3. Very small solid lesion at the lower pole the right kidney which is   quite suspicious for neoplasm. Consider urologic surgery consultation.    4. Stable enhancing lesion in the right hepatic lobe

## 2019-12-10 ENCOUNTER — HOSPITAL ENCOUNTER (EMERGENCY)
Age: 64
Discharge: HOME | End: 2019-12-10
Attending: EMERGENCY MEDICINE
Payer: COMMERCIAL

## 2019-12-10 ENCOUNTER — APPOINTMENT (OUTPATIENT)
Dept: GENERAL RADIOLOGY | Age: 64
End: 2019-12-10
Payer: COMMERCIAL

## 2019-12-10 ENCOUNTER — APPOINTMENT (OUTPATIENT)
Dept: CT IMAGING | Age: 64
End: 2019-12-10
Payer: COMMERCIAL

## 2019-12-10 VITALS
DIASTOLIC BLOOD PRESSURE: 62 MMHG | RESPIRATION RATE: 16 BRPM | HEART RATE: 82 BPM | BODY MASS INDEX: 29.44 KG/M2 | HEIGHT: 62 IN | WEIGHT: 160 LBS | OXYGEN SATURATION: 99 % | TEMPERATURE: 98 F | SYSTOLIC BLOOD PRESSURE: 127 MMHG

## 2019-12-10 DIAGNOSIS — K44.9 HIATAL HERNIA: ICD-10-CM

## 2019-12-10 DIAGNOSIS — R07.9 CHEST PAIN, UNSPECIFIED TYPE: ICD-10-CM

## 2019-12-10 DIAGNOSIS — R10.13 EPIGASTRIC ABDOMINAL PAIN: Primary | ICD-10-CM

## 2019-12-10 LAB
ALBUMIN SERPL-MCNC: 3.7 G/DL (ref 3.5–5.2)
ALP BLD-CCNC: 62 U/L (ref 35–104)
ALT SERPL-CCNC: 14 U/L (ref 0–32)
ANION GAP SERPL CALCULATED.3IONS-SCNC: 10 MMOL/L (ref 7–16)
AST SERPL-CCNC: 12 U/L (ref 0–31)
BASOPHILS ABSOLUTE: 0.02 E9/L (ref 0–0.2)
BASOPHILS RELATIVE PERCENT: 0.3 % (ref 0–2)
BILIRUB SERPL-MCNC: 0.7 MG/DL (ref 0–1.2)
BUN BLDV-MCNC: 13 MG/DL (ref 8–23)
CALCIUM SERPL-MCNC: 8.7 MG/DL (ref 8.6–10.2)
CHLORIDE BLD-SCNC: 108 MMOL/L (ref 98–107)
CO2: 22 MMOL/L (ref 22–29)
CREAT SERPL-MCNC: 0.7 MG/DL (ref 0.5–1)
EKG ATRIAL RATE: 97 BPM
EKG P AXIS: 44 DEGREES
EKG P-R INTERVAL: 148 MS
EKG Q-T INTERVAL: 366 MS
EKG QRS DURATION: 86 MS
EKG QTC CALCULATION (BAZETT): 464 MS
EKG R AXIS: 84 DEGREES
EKG T AXIS: 24 DEGREES
EKG VENTRICULAR RATE: 97 BPM
EOSINOPHILS ABSOLUTE: 0.06 E9/L (ref 0.05–0.5)
EOSINOPHILS RELATIVE PERCENT: 0.9 % (ref 0–6)
GFR AFRICAN AMERICAN: >60
GFR NON-AFRICAN AMERICAN: >60 ML/MIN/1.73
GLUCOSE BLD-MCNC: 97 MG/DL (ref 74–99)
HCT VFR BLD CALC: 49.2 % (ref 34–48)
HEMOGLOBIN: 16.4 G/DL (ref 11.5–15.5)
IMMATURE GRANULOCYTES #: 0.01 E9/L
IMMATURE GRANULOCYTES %: 0.2 % (ref 0–5)
LACTIC ACID: 1.8 MMOL/L (ref 0.5–2.2)
LIPASE: 24 U/L (ref 13–60)
LYMPHOCYTES ABSOLUTE: 0.85 E9/L (ref 1.5–4)
LYMPHOCYTES RELATIVE PERCENT: 13.4 % (ref 20–42)
MCH RBC QN AUTO: 30.8 PG (ref 26–35)
MCHC RBC AUTO-ENTMCNC: 33.3 % (ref 32–34.5)
MCV RBC AUTO: 92.5 FL (ref 80–99.9)
MONOCYTES ABSOLUTE: 0.46 E9/L (ref 0.1–0.95)
MONOCYTES RELATIVE PERCENT: 7.3 % (ref 2–12)
NEUTROPHILS ABSOLUTE: 4.92 E9/L (ref 1.8–7.3)
NEUTROPHILS RELATIVE PERCENT: 77.9 % (ref 43–80)
PDW BLD-RTO: 12.2 FL (ref 11.5–15)
PLATELET # BLD: 248 E9/L (ref 130–450)
PMV BLD AUTO: 9.9 FL (ref 7–12)
POTASSIUM REFLEX MAGNESIUM: 3.7 MMOL/L (ref 3.5–5)
RBC # BLD: 5.32 E12/L (ref 3.5–5.5)
REASON FOR REJECTION: NORMAL
REJECTED TEST: NORMAL
SODIUM BLD-SCNC: 140 MMOL/L (ref 132–146)
TOTAL PROTEIN: 6 G/DL (ref 6.4–8.3)
TROPONIN: <0.01 NG/ML (ref 0–0.03)
WBC # BLD: 6.3 E9/L (ref 4.5–11.5)

## 2019-12-10 PROCEDURE — 96375 TX/PRO/DX INJ NEW DRUG ADDON: CPT

## 2019-12-10 PROCEDURE — 96374 THER/PROPH/DIAG INJ IV PUSH: CPT

## 2019-12-10 PROCEDURE — 93005 ELECTROCARDIOGRAM TRACING: CPT | Performed by: STUDENT IN AN ORGANIZED HEALTH CARE EDUCATION/TRAINING PROGRAM

## 2019-12-10 PROCEDURE — 6370000000 HC RX 637 (ALT 250 FOR IP): Performed by: STUDENT IN AN ORGANIZED HEALTH CARE EDUCATION/TRAINING PROGRAM

## 2019-12-10 PROCEDURE — 83605 ASSAY OF LACTIC ACID: CPT

## 2019-12-10 PROCEDURE — 6360000002 HC RX W HCPCS: Performed by: STUDENT IN AN ORGANIZED HEALTH CARE EDUCATION/TRAINING PROGRAM

## 2019-12-10 PROCEDURE — 85025 COMPLETE CBC W/AUTO DIFF WBC: CPT

## 2019-12-10 PROCEDURE — 80053 COMPREHEN METABOLIC PANEL: CPT

## 2019-12-10 PROCEDURE — 83690 ASSAY OF LIPASE: CPT

## 2019-12-10 PROCEDURE — 99285 EMERGENCY DEPT VISIT HI MDM: CPT

## 2019-12-10 PROCEDURE — 2580000003 HC RX 258: Performed by: STUDENT IN AN ORGANIZED HEALTH CARE EDUCATION/TRAINING PROGRAM

## 2019-12-10 PROCEDURE — C9113 INJ PANTOPRAZOLE SODIUM, VIA: HCPCS | Performed by: STUDENT IN AN ORGANIZED HEALTH CARE EDUCATION/TRAINING PROGRAM

## 2019-12-10 PROCEDURE — 71046 X-RAY EXAM CHEST 2 VIEWS: CPT

## 2019-12-10 PROCEDURE — 84484 ASSAY OF TROPONIN QUANT: CPT

## 2019-12-10 RX ORDER — PANTOPRAZOLE SODIUM 40 MG/10ML
40 INJECTION, POWDER, LYOPHILIZED, FOR SOLUTION INTRAVENOUS ONCE
Status: COMPLETED | OUTPATIENT
Start: 2019-12-10 | End: 2019-12-10

## 2019-12-10 RX ORDER — PANTOPRAZOLE SODIUM 40 MG/1
40 TABLET, DELAYED RELEASE ORAL DAILY
COMMUNITY
End: 2019-12-10 | Stop reason: ALTCHOICE

## 2019-12-10 RX ORDER — PANTOPRAZOLE SODIUM 40 MG/1
40 TABLET, DELAYED RELEASE ORAL DAILY
Qty: 10 TABLET | Refills: 0 | Status: ON HOLD | OUTPATIENT
Start: 2019-12-10 | End: 2020-05-15 | Stop reason: HOSPADM

## 2019-12-10 RX ORDER — 0.9 % SODIUM CHLORIDE 0.9 %
1000 INTRAVENOUS SOLUTION INTRAVENOUS ONCE
Status: COMPLETED | OUTPATIENT
Start: 2019-12-10 | End: 2019-12-10

## 2019-12-10 RX ORDER — ONDANSETRON 2 MG/ML
4 INJECTION INTRAMUSCULAR; INTRAVENOUS ONCE
Status: COMPLETED | OUTPATIENT
Start: 2019-12-10 | End: 2019-12-10

## 2019-12-10 RX ADMIN — ONDANSETRON 4 MG: 2 INJECTION INTRAMUSCULAR; INTRAVENOUS at 10:48

## 2019-12-10 RX ADMIN — SODIUM CHLORIDE 1000 ML: 9 INJECTION, SOLUTION INTRAVENOUS at 10:35

## 2019-12-10 RX ADMIN — LIDOCAINE HYDROCHLORIDE: 20 SOLUTION ORAL; TOPICAL at 10:52

## 2019-12-10 RX ADMIN — PANTOPRAZOLE SODIUM 40 MG: 40 INJECTION, POWDER, FOR SOLUTION INTRAVENOUS at 10:52

## 2019-12-10 ASSESSMENT — PAIN SCALES - GENERAL: PAINLEVEL_OUTOF10: 1

## 2019-12-10 ASSESSMENT — PAIN DESCRIPTION - PAIN TYPE: TYPE: ACUTE PAIN

## 2019-12-10 ASSESSMENT — PAIN DESCRIPTION - LOCATION: LOCATION: CHEST

## 2019-12-10 ASSESSMENT — PAIN DESCRIPTION - ORIENTATION: ORIENTATION: LEFT

## 2019-12-11 ASSESSMENT — ENCOUNTER SYMPTOMS
PHOTOPHOBIA: 0
SHORTNESS OF BREATH: 0
VOMITING: 0
ORTHOPNEA: 0
COUGH: 0
BACK PAIN: 0
TROUBLE SWALLOWING: 0
NAUSEA: 1
DIARRHEA: 0
ABDOMINAL PAIN: 1

## 2019-12-19 ENCOUNTER — HOSPITAL ENCOUNTER (OUTPATIENT)
Dept: GENERAL RADIOLOGY | Age: 64
Discharge: HOME | End: 2019-12-21
Payer: COMMERCIAL

## 2019-12-19 ENCOUNTER — HOSPITAL ENCOUNTER (OUTPATIENT)
Age: 64
Discharge: HOME | End: 2019-12-21
Payer: COMMERCIAL

## 2019-12-19 DIAGNOSIS — R10.10 PAIN OF UPPER ABDOMEN: ICD-10-CM

## 2019-12-19 PROCEDURE — 74018 RADEX ABDOMEN 1 VIEW: CPT

## 2020-05-08 ENCOUNTER — APPOINTMENT (OUTPATIENT)
Dept: CT IMAGING | Age: 65
DRG: 263 | End: 2020-05-08
Payer: COMMERCIAL

## 2020-05-08 ENCOUNTER — HOSPITAL ENCOUNTER (INPATIENT)
Age: 65
LOS: 1 days | Discharge: HOME | DRG: 263 | End: 2020-05-15
Attending: EMERGENCY MEDICINE | Admitting: INTERNAL MEDICINE
Payer: COMMERCIAL

## 2020-05-08 LAB
ALBUMIN SERPL-MCNC: 4.7 G/DL (ref 3.5–5.2)
ALP BLD-CCNC: 68 U/L (ref 35–104)
ALT SERPL-CCNC: 18 U/L (ref 0–32)
ANION GAP SERPL CALCULATED.3IONS-SCNC: 15 MMOL/L (ref 7–16)
AST SERPL-CCNC: 16 U/L (ref 0–31)
BASOPHILS ABSOLUTE: 0.02 E9/L (ref 0–0.2)
BASOPHILS RELATIVE PERCENT: 0.2 % (ref 0–2)
BILIRUB SERPL-MCNC: 1.2 MG/DL (ref 0–1.2)
BUN BLDV-MCNC: 11 MG/DL (ref 8–23)
CALCIUM SERPL-MCNC: 10.5 MG/DL (ref 8.6–10.2)
CHLORIDE BLD-SCNC: 102 MMOL/L (ref 98–107)
CO2: 20 MMOL/L (ref 22–29)
CREAT SERPL-MCNC: 0.7 MG/DL (ref 0.5–1)
EOSINOPHILS ABSOLUTE: 0 E9/L (ref 0.05–0.5)
EOSINOPHILS RELATIVE PERCENT: 0 % (ref 0–6)
GFR AFRICAN AMERICAN: >60
GFR NON-AFRICAN AMERICAN: >60 ML/MIN/1.73
GLUCOSE BLD-MCNC: 129 MG/DL (ref 74–99)
HCT VFR BLD CALC: 46.9 % (ref 34–48)
HEMOGLOBIN: 16.4 G/DL (ref 11.5–15.5)
IMMATURE GRANULOCYTES #: 0.04 E9/L
IMMATURE GRANULOCYTES %: 0.4 % (ref 0–5)
LACTIC ACID: 1.6 MMOL/L (ref 0.5–2.2)
LIPASE: 24 U/L (ref 13–60)
LYMPHOCYTES ABSOLUTE: 1.13 E9/L (ref 1.5–4)
LYMPHOCYTES RELATIVE PERCENT: 11.3 % (ref 20–42)
MCH RBC QN AUTO: 30.8 PG (ref 26–35)
MCHC RBC AUTO-ENTMCNC: 35 % (ref 32–34.5)
MCV RBC AUTO: 88 FL (ref 80–99.9)
MONOCYTES ABSOLUTE: 0.48 E9/L (ref 0.1–0.95)
MONOCYTES RELATIVE PERCENT: 4.8 % (ref 2–12)
NEUTROPHILS ABSOLUTE: 8.31 E9/L (ref 1.8–7.3)
NEUTROPHILS RELATIVE PERCENT: 83.3 % (ref 43–80)
PDW BLD-RTO: 11.9 FL (ref 11.5–15)
PLATELET # BLD: 302 E9/L (ref 130–450)
PMV BLD AUTO: 9.4 FL (ref 7–12)
POTASSIUM SERPL-SCNC: 4 MMOL/L (ref 3.5–5)
RBC # BLD: 5.33 E12/L (ref 3.5–5.5)
SODIUM BLD-SCNC: 137 MMOL/L (ref 132–146)
STREP GRP A PCR: NEGATIVE
TOTAL PROTEIN: 7.3 G/DL (ref 6.4–8.3)
TROPONIN: <0.01 NG/ML (ref 0–0.03)
WBC # BLD: 10 E9/L (ref 4.5–11.5)

## 2020-05-08 PROCEDURE — 80053 COMPREHEN METABOLIC PANEL: CPT

## 2020-05-08 PROCEDURE — 85025 COMPLETE CBC W/AUTO DIFF WBC: CPT

## 2020-05-08 PROCEDURE — 96374 THER/PROPH/DIAG INJ IV PUSH: CPT

## 2020-05-08 PROCEDURE — 6360000002 HC RX W HCPCS: Performed by: PHYSICIAN ASSISTANT

## 2020-05-08 PROCEDURE — 74177 CT ABD & PELVIS W/CONTRAST: CPT

## 2020-05-08 PROCEDURE — 2580000003 HC RX 258: Performed by: PHYSICIAN ASSISTANT

## 2020-05-08 PROCEDURE — 83605 ASSAY OF LACTIC ACID: CPT

## 2020-05-08 PROCEDURE — 83690 ASSAY OF LIPASE: CPT

## 2020-05-08 PROCEDURE — 84484 ASSAY OF TROPONIN QUANT: CPT

## 2020-05-08 PROCEDURE — 87880 STREP A ASSAY W/OPTIC: CPT

## 2020-05-08 PROCEDURE — 93005 ELECTROCARDIOGRAM TRACING: CPT | Performed by: PHYSICIAN ASSISTANT

## 2020-05-08 PROCEDURE — 99285 EMERGENCY DEPT VISIT HI MDM: CPT

## 2020-05-08 RX ORDER — ONDANSETRON 2 MG/ML
4 INJECTION INTRAMUSCULAR; INTRAVENOUS ONCE
Status: DISCONTINUED | OUTPATIENT
Start: 2020-05-08 | End: 2020-05-08

## 2020-05-08 RX ORDER — PANTOPRAZOLE SODIUM 40 MG/10ML
40 INJECTION, POWDER, LYOPHILIZED, FOR SOLUTION INTRAVENOUS ONCE
Status: COMPLETED | OUTPATIENT
Start: 2020-05-08 | End: 2020-05-09

## 2020-05-08 RX ORDER — 0.9 % SODIUM CHLORIDE 0.9 %
1000 INTRAVENOUS SOLUTION INTRAVENOUS ONCE
Status: COMPLETED | OUTPATIENT
Start: 2020-05-08 | End: 2020-05-09

## 2020-05-08 RX ORDER — PROCHLORPERAZINE EDISYLATE 5 MG/ML
10 INJECTION INTRAMUSCULAR; INTRAVENOUS ONCE
Status: COMPLETED | OUTPATIENT
Start: 2020-05-08 | End: 2020-05-08

## 2020-05-08 RX ADMIN — PROCHLORPERAZINE EDISYLATE 10 MG: 5 INJECTION INTRAMUSCULAR; INTRAVENOUS at 23:25

## 2020-05-08 RX ADMIN — SODIUM CHLORIDE 1000 ML: 9 INJECTION, SOLUTION INTRAVENOUS at 23:10

## 2020-05-09 PROBLEM — R11.2 INTRACTABLE NAUSEA AND VOMITING: Status: ACTIVE | Noted: 2020-05-09

## 2020-05-09 PROBLEM — R11.2 INTRACTABLE VOMITING WITH NAUSEA: Status: ACTIVE | Noted: 2020-05-09

## 2020-05-09 PROBLEM — Z87.19 HISTORY OF HIATAL HERNIA: Status: ACTIVE | Noted: 2020-05-09

## 2020-05-09 LAB
BACTERIA: ABNORMAL /HPF
BILIRUBIN URINE: NEGATIVE
BLOOD, URINE: ABNORMAL
CLARITY: CLEAR
COLOR: YELLOW
EKG ATRIAL RATE: 113 BPM
EKG P AXIS: 50 DEGREES
EKG P-R INTERVAL: 160 MS
EKG Q-T INTERVAL: 346 MS
EKG QRS DURATION: 88 MS
EKG QTC CALCULATION (BAZETT): 474 MS
EKG R AXIS: 73 DEGREES
EKG T AXIS: 26 DEGREES
EKG VENTRICULAR RATE: 113 BPM
EPITHELIAL CELLS, UA: ABNORMAL /HPF
GLUCOSE URINE: NEGATIVE MG/DL
KETONES, URINE: 40 MG/DL
LEUKOCYTE ESTERASE, URINE: ABNORMAL
NITRITE, URINE: NEGATIVE
PH UA: 6.5 (ref 5–9)
PROTEIN UA: NEGATIVE MG/DL
RBC UA: ABNORMAL /HPF (ref 0–2)
SPECIFIC GRAVITY UA: <=1.005 (ref 1–1.03)
UROBILINOGEN, URINE: 0.2 E.U./DL
WBC UA: ABNORMAL /HPF (ref 0–5)

## 2020-05-09 PROCEDURE — C9113 INJ PANTOPRAZOLE SODIUM, VIA: HCPCS | Performed by: PHYSICIAN ASSISTANT

## 2020-05-09 PROCEDURE — G0378 HOSPITAL OBSERVATION PER HR: HCPCS

## 2020-05-09 PROCEDURE — 6360000002 HC RX W HCPCS: Performed by: PHYSICIAN ASSISTANT

## 2020-05-09 PROCEDURE — 81001 URINALYSIS AUTO W/SCOPE: CPT

## 2020-05-09 PROCEDURE — 96375 TX/PRO/DX INJ NEW DRUG ADDON: CPT

## 2020-05-09 PROCEDURE — 97161 PT EVAL LOW COMPLEX 20 MIN: CPT

## 2020-05-09 PROCEDURE — 6370000000 HC RX 637 (ALT 250 FOR IP): Performed by: NURSE PRACTITIONER

## 2020-05-09 PROCEDURE — 96376 TX/PRO/DX INJ SAME DRUG ADON: CPT

## 2020-05-09 PROCEDURE — 2580000003 HC RX 258: Performed by: NURSE PRACTITIONER

## 2020-05-09 PROCEDURE — 6370000000 HC RX 637 (ALT 250 FOR IP): Performed by: PHYSICIAN ASSISTANT

## 2020-05-09 PROCEDURE — 2580000003 HC RX 258: Performed by: RADIOLOGY

## 2020-05-09 PROCEDURE — 6370000000 HC RX 637 (ALT 250 FOR IP): Performed by: INTERNAL MEDICINE

## 2020-05-09 PROCEDURE — 93010 ELECTROCARDIOGRAM REPORT: CPT | Performed by: INTERNAL MEDICINE

## 2020-05-09 PROCEDURE — 6360000004 HC RX CONTRAST MEDICATION: Performed by: RADIOLOGY

## 2020-05-09 RX ORDER — PANTOPRAZOLE SODIUM 40 MG/1
40 TABLET, DELAYED RELEASE ORAL
Status: DISCONTINUED | OUTPATIENT
Start: 2020-05-09 | End: 2020-05-15 | Stop reason: HOSPADM

## 2020-05-09 RX ORDER — LORAZEPAM 0.5 MG/1
0.5 TABLET ORAL 2 TIMES DAILY
Status: DISCONTINUED | OUTPATIENT
Start: 2020-05-09 | End: 2020-05-15 | Stop reason: HOSPADM

## 2020-05-09 RX ORDER — PROMETHAZINE HYDROCHLORIDE 25 MG/1
12.5 TABLET ORAL EVERY 6 HOURS PRN
Status: DISCONTINUED | OUTPATIENT
Start: 2020-05-09 | End: 2020-05-15 | Stop reason: HOSPADM

## 2020-05-09 RX ORDER — ACETAMINOPHEN 650 MG/1
650 SUPPOSITORY RECTAL EVERY 6 HOURS PRN
Status: DISCONTINUED | OUTPATIENT
Start: 2020-05-09 | End: 2020-05-15 | Stop reason: HOSPADM

## 2020-05-09 RX ORDER — ACETAMINOPHEN 325 MG/1
650 TABLET ORAL EVERY 6 HOURS PRN
Status: DISCONTINUED | OUTPATIENT
Start: 2020-05-09 | End: 2020-05-15 | Stop reason: HOSPADM

## 2020-05-09 RX ORDER — SODIUM CHLORIDE 0.9 % (FLUSH) 0.9 %
10 SYRINGE (ML) INJECTION PRN
Status: DISCONTINUED | OUTPATIENT
Start: 2020-05-09 | End: 2020-05-15 | Stop reason: HOSPADM

## 2020-05-09 RX ORDER — ONDANSETRON 2 MG/ML
4 INJECTION INTRAMUSCULAR; INTRAVENOUS EVERY 6 HOURS PRN
Status: DISCONTINUED | OUTPATIENT
Start: 2020-05-09 | End: 2020-05-15 | Stop reason: HOSPADM

## 2020-05-09 RX ORDER — SODIUM CHLORIDE 0.9 % (FLUSH) 0.9 %
10 SYRINGE (ML) INJECTION 2 TIMES DAILY
Status: DISCONTINUED | OUTPATIENT
Start: 2020-05-09 | End: 2020-05-09 | Stop reason: SDUPTHER

## 2020-05-09 RX ORDER — METOCLOPRAMIDE 5 MG/1
5 TABLET ORAL NIGHTLY
Status: DISCONTINUED | OUTPATIENT
Start: 2020-05-09 | End: 2020-05-15 | Stop reason: HOSPADM

## 2020-05-09 RX ORDER — SODIUM CHLORIDE 0.9 % (FLUSH) 0.9 %
10 SYRINGE (ML) INJECTION EVERY 12 HOURS SCHEDULED
Status: DISCONTINUED | OUTPATIENT
Start: 2020-05-09 | End: 2020-05-15 | Stop reason: HOSPADM

## 2020-05-09 RX ORDER — CETIRIZINE HYDROCHLORIDE 1 MG/ML
5 SOLUTION ORAL DAILY
Status: DISCONTINUED | OUTPATIENT
Start: 2020-05-09 | End: 2020-05-15 | Stop reason: HOSPADM

## 2020-05-09 RX ORDER — SUCRALFATE 1 G/1
1 TABLET ORAL EVERY 12 HOURS SCHEDULED
Status: DISCONTINUED | OUTPATIENT
Start: 2020-05-09 | End: 2020-05-15 | Stop reason: HOSPADM

## 2020-05-09 RX ORDER — ONDANSETRON 2 MG/ML
4 INJECTION INTRAMUSCULAR; INTRAVENOUS ONCE
Status: COMPLETED | OUTPATIENT
Start: 2020-05-09 | End: 2020-05-09

## 2020-05-09 RX ORDER — PANTOPRAZOLE SODIUM 40 MG/1
40 TABLET, DELAYED RELEASE ORAL DAILY
Status: DISCONTINUED | OUTPATIENT
Start: 2020-05-09 | End: 2020-05-09

## 2020-05-09 RX ORDER — TRIAMCINOLONE ACETONIDE 5 MG/G
CREAM TOPICAL 2 TIMES DAILY PRN
COMMUNITY
Start: 2013-12-12 | End: 2022-02-14

## 2020-05-09 RX ORDER — LORAZEPAM 0.5 MG/1
1 TABLET ORAL 2 TIMES DAILY
COMMUNITY
End: 2022-02-14

## 2020-05-09 RX ADMIN — PANTOPRAZOLE SODIUM 40 MG: 40 INJECTION, POWDER, FOR SOLUTION INTRAVENOUS at 01:13

## 2020-05-09 RX ADMIN — SODIUM CHLORIDE, PRESERVATIVE FREE 10 ML: 5 INJECTION INTRAVENOUS at 08:02

## 2020-05-09 RX ADMIN — PANTOPRAZOLE SODIUM 40 MG: 40 TABLET, DELAYED RELEASE ORAL at 08:01

## 2020-05-09 RX ADMIN — TRIMETHOBENZAMIDE HYDROCHLORIDE 300 MG: 300 CAPSULE ORAL at 01:58

## 2020-05-09 RX ADMIN — ONDANSETRON 4 MG: 2 INJECTION INTRAMUSCULAR; INTRAVENOUS at 03:26

## 2020-05-09 RX ADMIN — LIDOCAINE HYDROCHLORIDE: 20 SOLUTION ORAL; TOPICAL at 08:05

## 2020-05-09 RX ADMIN — LIDOCAINE HYDROCHLORIDE: 20 SOLUTION ORAL; TOPICAL at 01:13

## 2020-05-09 RX ADMIN — PHENYLEPHRINE HYDROCHLORIDE 1 SPRAY: 1 SPRAY NASAL at 16:27

## 2020-05-09 RX ADMIN — SALINE NASAL SPRAY 1 SPRAY: 1.5 SOLUTION NASAL at 16:27

## 2020-05-09 RX ADMIN — LIDOCAINE HYDROCHLORIDE: 20 SOLUTION ORAL; TOPICAL at 13:05

## 2020-05-09 RX ADMIN — IOPAMIDOL 100 ML: 755 INJECTION, SOLUTION INTRAVENOUS at 00:11

## 2020-05-09 RX ADMIN — Medication 10 ML: at 01:14

## 2020-05-09 RX ADMIN — TRIMETHOBENZAMIDE HYDROCHLORIDE 300 MG: 300 CAPSULE ORAL at 13:03

## 2020-05-09 RX ADMIN — PANTOPRAZOLE SODIUM 40 MG: 40 TABLET, DELAYED RELEASE ORAL at 16:27

## 2020-05-09 RX ADMIN — TRIMETHOBENZAMIDE HYDROCHLORIDE 300 MG: 300 CAPSULE ORAL at 08:01

## 2020-05-09 RX ADMIN — PROMETHAZINE HYDROCHLORIDE 12.5 MG: 25 TABLET ORAL at 13:02

## 2020-05-09 RX ADMIN — CETIRIZINE HYDROCHLORIDE 5 MG: 1 SOLUTION ORAL at 16:27

## 2020-05-09 RX ADMIN — SODIUM CHLORIDE, PRESERVATIVE FREE 10 ML: 5 INJECTION INTRAVENOUS at 20:25

## 2020-05-09 RX ADMIN — LIDOCAINE HYDROCHLORIDE: 20 SOLUTION ORAL; TOPICAL at 16:27

## 2020-05-09 RX ADMIN — Medication 10 ML: at 00:07

## 2020-05-09 RX ADMIN — PROMETHAZINE HYDROCHLORIDE 12.5 MG: 25 TABLET ORAL at 05:01

## 2020-05-09 RX ADMIN — PROMETHAZINE HYDROCHLORIDE 12.5 MG: 25 TABLET ORAL at 18:44

## 2020-05-09 RX ADMIN — METOCLOPRAMIDE 5 MG: 5 TABLET ORAL at 20:25

## 2020-05-09 RX ADMIN — LORAZEPAM 0.5 MG: 0.5 TABLET ORAL at 20:25

## 2020-05-09 RX ADMIN — TRIMETHOBENZAMIDE HYDROCHLORIDE 300 MG: 300 CAPSULE ORAL at 20:25

## 2020-05-09 RX ADMIN — LIDOCAINE HYDROCHLORIDE: 20 SOLUTION ORAL; TOPICAL at 22:01

## 2020-05-09 RX ADMIN — SUCRALFATE 1 G: 1 TABLET ORAL at 20:25

## 2020-05-09 ASSESSMENT — PAIN SCALES - GENERAL
PAINLEVEL_OUTOF10: 0

## 2020-05-09 NOTE — PROGRESS NOTES
Nurses Aid Jocelyn Crews came to me and notified patient tripped over own foot and fell down on one knee. No injury or redness to knee noted. Explained to patient need for bed alarm but patient refused bed alarm. Bed alarm waiver signed and in chart.

## 2020-05-09 NOTE — PROGRESS NOTES
Physical Therapy    Facility/Department: [address] MED SURG  Initial Assessment    NAME: Gabriela Cedillo  : 1955  MRN: [id number]    Date of Service: 2020    Discharge Recommendations:      PT Equipment Recommendations  Equipment Needed: No    Assessment      No Skilled PT: Independent with functional mobility   REQUIRES PT FOLLOW UP: No  Activity Tolerance  Activity Tolerance: Patient Tolerated treatment well       Patient Diagnosis(es): The primary encounter diagnosis was Kidney lesion, native, right. Diagnoses of Calculus of gallbladder without cholecystitis without obstruction and Intractable nausea and vomiting were also pertinent to this visit. has a past medical history of Allergic rhinitis and Hernia, hiatal.   has a past surgical history that includes cyst removal; Tubal ligation (); and Hysterectomy.     Restrictions  Restrictions/Precautions  Restrictions/Precautions: General Precautions  Required Braces or Orthoses?: No  Vision/Hearing  Vision: Within Functional Limits  Hearing: Within functional limits     Subjective  General  Chart Reviewed: Yes  Patient assessed for rehabilitation services?: Yes  Family / Caregiver Present: No  Follows Commands: Within Functional Limits  Pain Screening  Patient Currently in Pain: Denies  Vital Signs  Patient Currently in Pain: Denies       Orientation  Orientation  Overall Orientation Status: Within Normal Limits  Social/Functional History  Social/Functional History  Lives With: Alone  Type of Home: House  Home Layout: One level  Home Access: Stairs to enter without rails  Entrance Stairs - Number of Steps: 3  ADL Assistance: Independent  Homemaking Assistance: Independent  Ambulation Assistance: Independent  Transfer Assistance: Independent  Active : Yes  Mode of Transportation: Car  Occupation: Retired  Additional Comments: Highly indep  Cognition        Objective          AROM RLE (degrees)  RLE AROM: WNL  AROM LLE (degrees)  LLE AROM :

## 2020-05-09 NOTE — CONSULTS
 magnesium hydroxide (MILK OF MAGNESIA) 400 MG/5ML suspension 30 mL  30 mL Oral Daily PRN BolaTAMARA Otero CNP        promethazine (PHENERGAN) tablet 12.5 mg  12.5 mg Oral Q6H PRN TAMARA Trejo CNP   12.5 mg at 05/09/20 0501    Or    ondansetron (ZOFRAN) injection 4 mg  4 mg Intravenous Q6H PRN TAMARA Butcher CNP        enoxaparin (LOVENOX) injection 40 mg  40 mg Subcutaneous Daily TAMARA Butcher CNP        aluminum & magnesium hydroxide-simethicone (MAALOX) 13 mL, lidocaine viscous hcl (XYLOCAINE) 2 mL (GI COCKTAIL)   Oral 4x Daily TAMARA Butcher CNP           Social History     Tobacco Use    Smoking status: Never Smoker    Smokeless tobacco: Never Used   Substance Use Topics    Alcohol use: No        Labs:  Lab Results   Component Value Date    WBC 10.0 05/08/2020    HCT 46.9 05/08/2020    HGB 16.4 (H) 05/08/2020     05/08/2020      Lab Results   Component Value Date     05/08/2020    K 4.0 05/08/2020     05/08/2020    CO2 20 (L) 05/08/2020    BUN 11 05/08/2020    CREATININE 0.7 05/08/2020    GLUCOSE 129 (H) 05/08/2020     Lab Results   Component Value Date    BILIDIR <0.2 03/02/2019    AST 16 05/08/2020    ALT 18 05/08/2020    ALKPHOS 68 05/08/2020    PROT 7.3 05/08/2020    LIPASE 24 05/08/2020        Review of Systems:    Other than stated above in the HPI is negative      Physical exam: BP (!) 141/79   Pulse 95   Temp 98.9 °F (37.2 °C) (Oral)   Resp 14   Ht 5' 2\" (1.575 m)   Wt 144 lb 4.8 oz (65.5 kg)   SpO2 97%   BMI 26.39 kg/m²     General appearance: no acute distress  Head: NCAT, PERRLA, EOMI  Neck: supple, no masses  Lungs: CTABL  Heart: RRR  Abdomen: soft, nondistended, nontender tender, normotympanic, no guarding, no peritoneal signs.   Extremities: no rash cyanosis edema or jaundice    Assessment:    Nausea and vomiting unlikely related to a GI source i.e. an obstruction or enteritis    Plan:    Remove nasogastric tube  Clear liquid diet  Kenny Poon MD 5/9/2020 at 7:00 AM

## 2020-05-09 NOTE — ED PROVIDER NOTES
Negative mg/dL    Specific Gravity, UA <=1.005 1.005 - 1.030    Blood, Urine SMALL (A) Negative    pH, UA 6.5 5.0 - 9.0    Protein, UA Negative Negative mg/dL    Urobilinogen, Urine 0.2 <2.0 E.U./dL    Nitrite, Urine Negative Negative    Leukocyte Esterase, Urine SMALL (A) Negative   Troponin   Result Value Ref Range    Troponin <0.01 0.00 - 0.03 ng/mL   Microscopic Urinalysis   Result Value Ref Range    WBC, UA 2-5 0 - 5 /HPF    RBC, UA 2-5 0 - 2 /HPF    Epithelial Cells, UA MODERATE /HPF    Bacteria, UA RARE (A) None Seen /HPF   EKG 12 Lead   Result Value Ref Range    Ventricular Rate 113 BPM    Atrial Rate 113 BPM    P-R Interval 160 ms    QRS Duration 88 ms    Q-T Interval 346 ms    QTc Calculation (Bazett) 474 ms    P Axis 50 degrees    R Axis 73 degrees    T Axis 26 degrees       RADIOLOGY:  Interpreted by Radiologist.  CT ABDOMEN PELVIS W IV CONTRAST Additional Contrast? None   Final Result   1. No acute inflammatory changes omental mesenteric fat planes, free   intraperitoneal air, ascites or indication for bowel obstruction. 2. Presence of multiple gallstones. 3. No dilatation biliary tree or pancreatic ductal system. 4. No obstructive uropathy. 5. Suspicious exophytic lesion in the right kidney measuring 11 x 6   mm. The see above comments and recommendations. 6. Presence of a moderate size hiatal hernia. No signs for   obstruction.                      ------------------------- NURSING NOTES AND VITALS REVIEWED ---------------------------   The nursing notes within the ED encounter and vital signs as below have been reviewed.    /75   Pulse 102   Temp 99.1 °F (37.3 °C) (Oral)   Resp 16   Ht 5' 2\" (1.575 m)   Wt 148 lb (67.1 kg)   SpO2 99%   BMI 27.07 kg/m²   Oxygen Saturation Interpretation: Normal      ---------------------------------------------------PHYSICAL EXAM--------------------------------------      Constitutional/General: Alert and oriented x3, well re-medicate. Awaiting urine results will admit patient for intractable nausea vomiting    0250 Discussed with [place] for [facility]  medical consultants she is agreeable to the admission. ED COURSE:       Medical Decision Making:    Came in with complaint of nausea vomiting on this started around 2 AM.  CT abdomen showed gallstones hiatal hernia lesion of the right kidney. There was no acute findings. Patient with persistent nausea vomiting. Will admit for further antiemetics IV fluids. Counseling: The emergency provider has spoken with the patient and discussed todays results, in addition to providing specific details for the plan of care and counseling regarding the diagnosis and prognosis. Questions are answered at this time and they are agreeable with the plan.      --------------------------------- IMPRESSION AND DISPOSITION ---------------------------------    IMPRESSION  1. Kidney lesion, native, right    2. Calculus of gallbladder without cholecystitis without obstruction    3. Intractable nausea and vomiting        DISPOSITION  Disposition: Admit to med/surg floor  Patient condition is fair      NOTE: This report was transcribed using voice recognition software.  Every effort was made to ensure accuracy; however, inadvertent computerized transcription errors may be present     ANGELA Brand  05/09/20 ANGELA Perkins  05/09/20 ANGELA Perkins  05/09/20 [id number]

## 2020-05-09 NOTE — H&P
is seen in the left kidney. There is a focal enhancing exophytic lesion of the mid third of the  right kidney, it represents a suspicious renal mass, it measures about  the 11 x 6 mm. These findings will required a dedicated evaluation for  the right kidney with a multiphase IV contrast study CT or  MRI, to be  performed on more routine bases. Aorta and IVC appear unremarkable. There is no midline retroperitoneal  adenopathy. Uterus and ovaries have unremarkable appearance. The bladder appear  unremarkable. The appendix seen and appears unremarkable. There is no pericolonic  inflammatory process. Lower lung bases demonstrate no significant findings. There is a moderate to size hiatal hernia. This was also observed  previously. Bone structures demonstrate degenerative changes in the thoracolumbar  spine.      Impression:       1. No acute inflammatory changes omental mesenteric fat planes, free  intraperitoneal air, ascites or indication for bowel obstruction. 2. Presence of multiple gallstones. 3. No dilatation biliary tree or pancreatic ductal system. 4. No obstructive uropathy. 5. Suspicious exophytic lesion in the right kidney measuring 11 x 6  mm. The see above comments and recommendations. 6. Presence of a moderate size hiatal hernia. No signs for  obstruction.    --Past history negative rheumatic fever scarlet fever polio diphtheria cancer diabetes  --Patient was having chest pain 2012 seen by cardiology, atypical chest pain referred to GI found to have hiatal hernia  --Denies any recurring pulmonary difficulties  --Chronic postnasal drainage from sinuses; takes Zyrtec had been using Flonase nasal spray stopped it because of nasal bleeding and irritation  --History of previous kidney stones  --Patient is on chronic Protonix once daily in the morning; she received prescription for Carafate but has not filled it as yet  --Earlier today the patient \"tripped\" on her feet fell Oropharynx clear with no exudate seen  Neck: Supple. No jugular venous distension, lymphadenopathy or thyromegaly Trachea midline  Lungs: Clear to auscultation bilaterally. No rhonchi, crackles or wheezes  Heart: S1 S2  Regular rate and rhythm. No rub, murmur or gallop  Abdomen: Soft, minimal tenderness epigastrium and left upper quadrant; there is no tenderness in the right upper quadrant. BS normal. No masses, organomegaly; no rebound or guarding  Extremities: No edema, Peripheral pulses palpable  Musculoskeletal: Muscular strength appears intact. Neuro:  No focal motor defects ; II-XII grossly intact .  [place] equally  Breast: deferred  Rectal: deferred  Genitalia:  deferred    LABS:  CBC:   Lab Results   Component Value Date    WBC 10.0 05/08/2020    RBC 5.33 05/08/2020    HGB 16.4 05/08/2020    HCT 46.9 05/08/2020    MCV 88.0 05/08/2020    MCH 30.8 05/08/2020    MCHC 35.0 05/08/2020    RDW 11.9 05/08/2020     05/08/2020    MPV 9.4 05/08/2020     CBC with Differential:    Lab Results   Component Value Date    WBC 10.0 05/08/2020    RBC 5.33 05/08/2020    HGB 16.4 05/08/2020    HCT 46.9 05/08/2020     05/08/2020    MCV 88.0 05/08/2020    MCH 30.8 05/08/2020    MCHC 35.0 05/08/2020    RDW 11.9 05/08/2020    LYMPHOPCT 11.3 05/08/2020    MONOPCT 4.8 05/08/2020    BASOPCT 0.2 05/08/2020    MONOSABS 0.48 05/08/2020    LYMPHSABS 1.13 05/08/2020    EOSABS 0.00 05/08/2020    BASOSABS 0.02 05/08/2020     Hemoglobin/Hematocrit:    Lab Results   Component Value Date    HGB 16.4 05/08/2020    HCT 46.9 05/08/2020     CMP:    Lab Results   Component Value Date     05/08/2020    K 4.0 05/08/2020    K 3.7 12/10/2019     05/08/2020    CO2 20 05/08/2020    BUN 11 05/08/2020    CREATININE 0.7 05/08/2020    GFRAA >60 05/08/2020    LABGLOM >60 05/08/2020    GLUCOSE 129 05/08/2020    PROT 7.3 05/08/2020    LABALBU 4.7 05/08/2020    CALCIUM 10.5 05/08/2020    BILITOT 1.2 05/08/2020    ALKPHOS 68 05/08/2020 No obstructive uropathy. 5. Suspicious exophytic lesion in the right kidney measuring 11 x 6   mm. The see above comments and recommendations. 6. Presence of a moderate size hiatal hernia. No signs for   obstruction. ASSESSMENT:      Active Hospital Problems    Diagnosis    Intractable vomiting with nausea [R11.2]    History of hiatal hernia [Z87.19]    Intractable nausea and vomiting [R11.2]    Renal mass, right [N28.89]    Asymptomatic gallstones [K80.20]    Hemangioma of liver [D18.03]    Kidney stone [N20.0]    Anxiety [F41.9]       PLAN:  Medications discussed with patient  GI prophylaxis  DVT prophylaxis  Consultants notes reviewed  Increase diet to soft low fiber  Extensive discussion with patient regarding lower esophageal sphincter, hiatal hernia, likely gastritis  Increase Protonix 40 mg twice daily  Carafate 1 g by mouth twice daily  Zyrtec 5 mg daily  Shad-Synephrine saline spray twice daily as needed  Saline nasal spray as needed  Reglan 5 mg at bedtime  Recheck appropriate labs in a.m. Possible discharge in a.m. Patient questions if any further work-up at this time. Since today is Saturday, routine EGD would not be done until at least Monday; unlikely any other testing at this point. She spoke with her gastroenterologist regarding gallstones at her recent visit; he advised against doing HIDA scan at this time.           Please note that over 50 minutes was spent in evaluating the patient, review of records and results, discussion with staff/family, etc.    [address]  2:16 PM  5/9/2020    Voice recognition software use for dictation

## 2020-05-10 PROBLEM — E53.8 B12 DEFICIENCY: Status: ACTIVE | Noted: 2020-05-10

## 2020-05-10 LAB
ALBUMIN SERPL-MCNC: 3.8 G/DL (ref 3.5–5.2)
ALP BLD-CCNC: 51 U/L (ref 35–104)
ALT SERPL-CCNC: 15 U/L (ref 0–32)
AMYLASE: 50 U/L (ref 20–100)
ANION GAP SERPL CALCULATED.3IONS-SCNC: 10 MMOL/L (ref 7–16)
AST SERPL-CCNC: 13 U/L (ref 0–31)
BILIRUB SERPL-MCNC: 0.8 MG/DL (ref 0–1.2)
BILIRUBIN DIRECT: <0.2 MG/DL (ref 0–0.3)
BILIRUBIN, INDIRECT: ABNORMAL MG/DL (ref 0–1)
BUN BLDV-MCNC: 13 MG/DL (ref 8–23)
C-REACTIVE PROTEIN: <0.1 MG/DL (ref 0–0.4)
CALCIUM SERPL-MCNC: 9.2 MG/DL (ref 8.6–10.2)
CHLORIDE BLD-SCNC: 105 MMOL/L (ref 98–107)
CO2: 24 MMOL/L (ref 22–29)
CREAT SERPL-MCNC: 0.8 MG/DL (ref 0.5–1)
FOLATE: 16.7 NG/ML (ref 4.8–24.2)
GFR AFRICAN AMERICAN: >60
GFR NON-AFRICAN AMERICAN: >60 ML/MIN/1.73
GLUCOSE BLD-MCNC: 93 MG/DL (ref 74–99)
HBA1C MFR BLD: 4.7 % (ref 4–5.6)
HCT VFR BLD CALC: 40.6 % (ref 34–48)
HEMOGLOBIN: 13.6 G/DL (ref 11.5–15.5)
LIPASE: 31 U/L (ref 13–60)
MAGNESIUM: 2.4 MG/DL (ref 1.6–2.6)
MCH RBC QN AUTO: 31 PG (ref 26–35)
MCHC RBC AUTO-ENTMCNC: 33.5 % (ref 32–34.5)
MCV RBC AUTO: 92.5 FL (ref 80–99.9)
PDW BLD-RTO: 12.3 FL (ref 11.5–15)
PHOSPHORUS: 3.5 MG/DL (ref 2.5–4.5)
PLATELET # BLD: 221 E9/L (ref 130–450)
PMV BLD AUTO: 9.4 FL (ref 7–12)
POTASSIUM SERPL-SCNC: 3.6 MMOL/L (ref 3.5–5)
PROCALCITONIN: 0.05 NG/ML (ref 0–0.08)
RBC # BLD: 4.39 E12/L (ref 3.5–5.5)
SEDIMENTATION RATE, ERYTHROCYTE: 5 MM/HR (ref 0–20)
SODIUM BLD-SCNC: 139 MMOL/L (ref 132–146)
TOTAL PROTEIN: 6 G/DL (ref 6.4–8.3)
VITAMIN B-12: 169 PG/ML (ref 211–946)
WBC # BLD: 5.4 E9/L (ref 4.5–11.5)

## 2020-05-10 PROCEDURE — 82746 ASSAY OF FOLIC ACID SERUM: CPT

## 2020-05-10 PROCEDURE — 84145 PROCALCITONIN (PCT): CPT

## 2020-05-10 PROCEDURE — 80076 HEPATIC FUNCTION PANEL: CPT

## 2020-05-10 PROCEDURE — 80048 BASIC METABOLIC PNL TOTAL CA: CPT

## 2020-05-10 PROCEDURE — 96372 THER/PROPH/DIAG INJ SC/IM: CPT

## 2020-05-10 PROCEDURE — 82607 VITAMIN B-12: CPT

## 2020-05-10 PROCEDURE — 82150 ASSAY OF AMYLASE: CPT

## 2020-05-10 PROCEDURE — 84100 ASSAY OF PHOSPHORUS: CPT

## 2020-05-10 PROCEDURE — 6370000000 HC RX 637 (ALT 250 FOR IP): Performed by: INTERNAL MEDICINE

## 2020-05-10 PROCEDURE — 96376 TX/PRO/DX INJ SAME DRUG ADON: CPT

## 2020-05-10 PROCEDURE — 6360000002 HC RX W HCPCS: Performed by: NURSE PRACTITIONER

## 2020-05-10 PROCEDURE — 85027 COMPLETE CBC AUTOMATED: CPT

## 2020-05-10 PROCEDURE — 6370000000 HC RX 637 (ALT 250 FOR IP): Performed by: NURSE PRACTITIONER

## 2020-05-10 PROCEDURE — 2580000003 HC RX 258: Performed by: NURSE PRACTITIONER

## 2020-05-10 PROCEDURE — 83690 ASSAY OF LIPASE: CPT

## 2020-05-10 PROCEDURE — 6360000002 HC RX W HCPCS: Performed by: INTERNAL MEDICINE

## 2020-05-10 PROCEDURE — 85651 RBC SED RATE NONAUTOMATED: CPT

## 2020-05-10 PROCEDURE — 86140 C-REACTIVE PROTEIN: CPT

## 2020-05-10 PROCEDURE — G0378 HOSPITAL OBSERVATION PER HR: HCPCS

## 2020-05-10 PROCEDURE — 83735 ASSAY OF MAGNESIUM: CPT

## 2020-05-10 PROCEDURE — 83036 HEMOGLOBIN GLYCOSYLATED A1C: CPT

## 2020-05-10 PROCEDURE — 36415 COLL VENOUS BLD VENIPUNCTURE: CPT

## 2020-05-10 RX ORDER — CYANOCOBALAMIN 1000 UG/ML
1000 INJECTION INTRAMUSCULAR; SUBCUTANEOUS ONCE
Status: COMPLETED | OUTPATIENT
Start: 2020-05-10 | End: 2020-05-10

## 2020-05-10 RX ADMIN — SODIUM CHLORIDE, PRESERVATIVE FREE 10 ML: 5 INJECTION INTRAVENOUS at 08:20

## 2020-05-10 RX ADMIN — LIDOCAINE HYDROCHLORIDE: 20 SOLUTION ORAL; TOPICAL at 21:10

## 2020-05-10 RX ADMIN — SODIUM CHLORIDE, PRESERVATIVE FREE 10 ML: 5 INJECTION INTRAVENOUS at 21:02

## 2020-05-10 RX ADMIN — SALINE NASAL SPRAY 1 SPRAY: 1.5 SOLUTION NASAL at 21:22

## 2020-05-10 RX ADMIN — CYANOCOBALAMIN 1000 MCG: 1000 INJECTION, SOLUTION INTRAMUSCULAR at 13:36

## 2020-05-10 RX ADMIN — ONDANSETRON 4 MG: 2 INJECTION INTRAMUSCULAR; INTRAVENOUS at 19:47

## 2020-05-10 RX ADMIN — SUCRALFATE 1 G: 1 TABLET ORAL at 08:16

## 2020-05-10 RX ADMIN — LORAZEPAM 0.5 MG: 0.5 TABLET ORAL at 21:02

## 2020-05-10 RX ADMIN — PANTOPRAZOLE SODIUM 40 MG: 40 TABLET, DELAYED RELEASE ORAL at 06:17

## 2020-05-10 RX ADMIN — PANTOPRAZOLE SODIUM 40 MG: 40 TABLET, DELAYED RELEASE ORAL at 17:08

## 2020-05-10 RX ADMIN — SUCRALFATE 1 G: 1 TABLET ORAL at 21:02

## 2020-05-10 RX ADMIN — METOCLOPRAMIDE 5 MG: 5 TABLET ORAL at 21:02

## 2020-05-10 RX ADMIN — TRIMETHOBENZAMIDE HYDROCHLORIDE 300 MG: 300 CAPSULE ORAL at 08:16

## 2020-05-10 RX ADMIN — LIDOCAINE HYDROCHLORIDE: 20 SOLUTION ORAL; TOPICAL at 17:12

## 2020-05-10 RX ADMIN — CETIRIZINE HYDROCHLORIDE 5 MG: 1 SOLUTION ORAL at 08:17

## 2020-05-10 RX ADMIN — LORAZEPAM 0.5 MG: 0.5 TABLET ORAL at 08:16

## 2020-05-10 ASSESSMENT — PAIN SCALES - GENERAL
PAINLEVEL_OUTOF10: 0
PAINLEVEL_OUTOF10: 0

## 2020-05-10 NOTE — PLAN OF CARE
Problem: Nausea/Vomiting:  Goal: Absence of nausea/vomiting  Description: Absence of nausea/vomiting  5/9/2020 2343 by Elvira Miguel RN  Outcome: Met This Shift     Problem: Nausea/Vomiting:  Goal: Able to drink  Description: Able to drink  5/9/2020 2343 by Elvira Miguel RN  Outcome: Met This Shift     Problem: Nausea/Vomiting:  Goal: Absence of nausea/vomiting  Description: Absence of nausea/vomiting  5/9/2020 2343 by Elvira Miguel RN  Outcome: Met This Shift     Problem: Falls - Risk of:  Goal: Will remain free from falls  Description: Will remain free from falls  5/9/2020 2343 by Elvira Miguel RN  Outcome: Met This Shift

## 2020-05-10 NOTE — PROGRESS NOTES
05/10/2020    GFRAA >60 05/10/2020    LABGLOM >60 05/10/2020    GLUCOSE 93 05/10/2020    PROT 6.0 05/10/2020    LABALBU 3.8 05/10/2020    CALCIUM 9.2 05/10/2020    BILITOT 0.8 05/10/2020    ALKPHOS 51 05/10/2020    AST 13 05/10/2020    ALT 15 05/10/2020     BMP:  Hepatic Function Panel:  Ionized Calcium:  No results found for: IONCA  Magnesium:    Lab Results   Component Value Date    MG 2.4 05/10/2020     Phosphorus:    Lab Results   Component Value Date    PHOS 3.5 05/10/2020       Current Inpatient Medications    Current Facility-Administered Medications: trimethobenzamide (TIGAN) capsule 300 mg, 300 mg, Oral, TID  sodium chloride flush 0.9 % injection 10 mL, 10 mL, Intravenous, 2 times per day  sodium chloride flush 0.9 % injection 10 mL, 10 mL, Intravenous, PRN  acetaminophen (TYLENOL) tablet 650 mg, 650 mg, Oral, Q6H PRN **OR** acetaminophen (TYLENOL) suppository 650 mg, 650 mg, Rectal, Q6H PRN  magnesium hydroxide (MILK OF MAGNESIA) 400 MG/5ML suspension 30 mL, 30 mL, Oral, Daily PRN  promethazine (PHENERGAN) tablet 12.5 mg, 12.5 mg, Oral, Q6H PRN **OR** ondansetron (ZOFRAN) injection 4 mg, 4 mg, Intravenous, Q6H PRN  enoxaparin (LOVENOX) injection 40 mg, 40 mg, Subcutaneous, Daily  aluminum & magnesium hydroxide-simethicone (MAALOX) 13 mL, lidocaine viscous hcl (XYLOCAINE) 2 mL (GI COCKTAIL), , Oral, 4x Daily  metoclopramide (REGLAN) tablet 5 mg, 5 mg, Oral, Nightly  sucralfate (CARAFATE) tablet 1 g, 1 g, Oral, 2 times per day  sodium chloride (OCEAN, BABY AYR) 0.65 % nasal spray 1 spray, 1 spray, Each Nostril, PRN  phenylephrine (MASOOD-SYNEPHRINE) 1 % nasal spray 1 spray, 1 spray, Each Nostril, Q6H PRN  cetirizine (ZYRTEC) syrup 5 mg, 5 mg, Oral, Daily  pantoprazole (PROTONIX) tablet 40 mg, 40 mg, Oral, BID AC  LORazepam (ATIVAN) tablet 0.5 mg, 0.5 mg, Oral, BID    ASSESSMENT:    63 y.o. female with postprandial nausea. Again, the CT of the abdomen and pelvis does show cholelithiasis.   The patient may have

## 2020-05-10 NOTE — PLAN OF CARE
Problem: Nausea/Vomiting:  Goal: Able to drink  Description: Able to drink  5/9/2020 2208 by Farshad Long RN  Outcome: Met This Shift     Problem: Infection:  Goal: Will remain free from infection  Description: Will remain free from infection  5/9/2020 2208 by Farshad Long RN  Outcome: Met This Shift     Problem: Safety:  Goal: Free from accidental physical injury  Description: Free from accidental physical injury  5/9/2020 2208 by Farshad Long RN  Outcome: Met This Shift     Problem: Safety:  Goal: Free from intentional harm  Description: Free from intentional harm  5/9/2020 2208 by Farshad Long RN  Outcome: Met This Shift     Problem: Daily Care:  Goal: Daily care needs are met  Description: Daily care needs are met  5/9/2020 2208 by Farshad Long RN  Outcome: Met This Shift     Problem: Pain:  Goal: Patient's pain/discomfort is manageable  Description: Patient's pain/discomfort is manageable  5/9/2020 2208 by Farshad Long RN  Outcome: Met This Shift     Problem: Falls - Risk of:  Goal: Will remain free from falls  Description: Will remain free from falls  Outcome: Met This Shift     Problem: Falls - Risk of:  Goal: Absence of physical injury  Description: Absence of physical injury  Outcome: Met This Shift     Problem: Nausea/Vomiting:  Goal: Absence of nausea/vomiting  Description: Absence of nausea/vomiting  5/9/2020 2208 by Farshad Long RN  Outcome: Not Met This Shift     Problem: Nausea/Vomiting:  Goal: Able to eat  Description: Able to eat  5/9/2020 2208 by Farshad Long RN  Outcome: Not Met This Shift     Problem: Discharge Planning:  Goal: Patients continuum of care needs are met  Description: Patients continuum of care needs are met  5/9/2020 2208 by Farshad Long RN  Outcome: Not Met This Shift

## 2020-05-10 NOTE — PLAN OF CARE
Problem: Nausea/Vomiting:  Goal: Absence of nausea/vomiting  Description: Absence of nausea/vomiting  Outcome: Met This Shift  Goal: Able to drink  Description: Able to drink  Outcome: Met This Shift  Goal: Able to eat  Description: Able to eat  Outcome: Met This Shift     Problem: Infection:  Goal: Will remain free from infection  Description: Will remain free from infection  Outcome: Met This Shift     Problem: Safety:  Goal: Free from accidental physical injury  Description: Free from accidental physical injury  Outcome: Met This Shift  Goal: Free from intentional harm  Description: Free from intentional harm  Outcome: Met This Shift     Problem: Daily Care:  Goal: Daily care needs are met  Description: Daily care needs are met  Outcome: Met This Shift     Problem: Pain:  Goal: Patient's pain/discomfort is manageable  Description: Patient's pain/discomfort is manageable  Outcome: Met This Shift     Problem: Discharge Planning:  Goal: Patients continuum of care needs are met  Description: Patients continuum of care needs are met  Outcome: Met This Shift     Problem: Falls - Risk of:  Goal: Will remain free from falls  Description: Will remain free from falls  Outcome: Met This Shift  Goal: Absence of physical injury  Description: Absence of physical injury  Outcome: Met This Shift

## 2020-05-11 ENCOUNTER — APPOINTMENT (OUTPATIENT)
Dept: NUCLEAR MEDICINE | Age: 65
DRG: 263 | End: 2020-05-11
Payer: COMMERCIAL

## 2020-05-11 ENCOUNTER — ANESTHESIA EVENT (OUTPATIENT)
Dept: OPERATING ROOM | Age: 65
DRG: 263 | End: 2020-05-11
Payer: COMMERCIAL

## 2020-05-11 LAB
ALBUMIN SERPL-MCNC: 3.9 G/DL (ref 3.5–5.2)
ALP BLD-CCNC: 52 U/L (ref 35–104)
ALT SERPL-CCNC: 13 U/L (ref 0–32)
ANION GAP SERPL CALCULATED.3IONS-SCNC: 8 MMOL/L (ref 7–16)
AST SERPL-CCNC: 11 U/L (ref 0–31)
BILIRUB SERPL-MCNC: 0.8 MG/DL (ref 0–1.2)
BILIRUBIN DIRECT: <0.2 MG/DL (ref 0–0.3)
BILIRUBIN, INDIRECT: ABNORMAL MG/DL (ref 0–1)
BUN BLDV-MCNC: 17 MG/DL (ref 8–23)
C-REACTIVE PROTEIN: <0.1 MG/DL (ref 0–0.4)
CALCIUM SERPL-MCNC: 9.3 MG/DL (ref 8.6–10.2)
CHLORIDE BLD-SCNC: 105 MMOL/L (ref 98–107)
CO2: 26 MMOL/L (ref 22–29)
CREAT SERPL-MCNC: 0.8 MG/DL (ref 0.5–1)
GFR AFRICAN AMERICAN: >60
GFR NON-AFRICAN AMERICAN: >60 ML/MIN/1.73
GLUCOSE BLD-MCNC: 95 MG/DL (ref 74–99)
MAGNESIUM: 2.4 MG/DL (ref 1.6–2.6)
PHOSPHORUS: 3.3 MG/DL (ref 2.5–4.5)
POTASSIUM SERPL-SCNC: 3.6 MMOL/L (ref 3.5–5)
SEDIMENTATION RATE, ERYTHROCYTE: 4 MM/HR (ref 0–20)
SODIUM BLD-SCNC: 139 MMOL/L (ref 132–146)
TOTAL PROTEIN: 6.2 G/DL (ref 6.4–8.3)

## 2020-05-11 PROCEDURE — 80076 HEPATIC FUNCTION PANEL: CPT

## 2020-05-11 PROCEDURE — A9537 TC99M MEBROFENIN: HCPCS | Performed by: RADIOLOGY

## 2020-05-11 PROCEDURE — 6360000002 HC RX W HCPCS: Performed by: NURSE PRACTITIONER

## 2020-05-11 PROCEDURE — 36415 COLL VENOUS BLD VENIPUNCTURE: CPT

## 2020-05-11 PROCEDURE — 96376 TX/PRO/DX INJ SAME DRUG ADON: CPT

## 2020-05-11 PROCEDURE — 78226 HEPATOBILIARY SYSTEM IMAGING: CPT

## 2020-05-11 PROCEDURE — G0378 HOSPITAL OBSERVATION PER HR: HCPCS

## 2020-05-11 PROCEDURE — 84100 ASSAY OF PHOSPHORUS: CPT

## 2020-05-11 PROCEDURE — 6370000000 HC RX 637 (ALT 250 FOR IP): Performed by: NURSE PRACTITIONER

## 2020-05-11 PROCEDURE — 2580000003 HC RX 258: Performed by: NURSE PRACTITIONER

## 2020-05-11 PROCEDURE — 3430000000 HC RX DIAGNOSTIC RADIOPHARMACEUTICAL: Performed by: RADIOLOGY

## 2020-05-11 PROCEDURE — 6370000000 HC RX 637 (ALT 250 FOR IP): Performed by: INTERNAL MEDICINE

## 2020-05-11 PROCEDURE — 83735 ASSAY OF MAGNESIUM: CPT

## 2020-05-11 PROCEDURE — 87081 CULTURE SCREEN ONLY: CPT

## 2020-05-11 PROCEDURE — 85651 RBC SED RATE NONAUTOMATED: CPT

## 2020-05-11 PROCEDURE — 86140 C-REACTIVE PROTEIN: CPT

## 2020-05-11 PROCEDURE — 80048 BASIC METABOLIC PNL TOTAL CA: CPT

## 2020-05-11 RX ORDER — CEFAZOLIN SODIUM 2 G/50ML
2 SOLUTION INTRAVENOUS SEE ADMIN INSTRUCTIONS
Status: COMPLETED | OUTPATIENT
Start: 2020-05-12 | End: 2020-05-12

## 2020-05-11 RX ADMIN — LIDOCAINE HYDROCHLORIDE: 20 SOLUTION ORAL; TOPICAL at 20:54

## 2020-05-11 RX ADMIN — PROMETHAZINE HYDROCHLORIDE 12.5 MG: 25 TABLET ORAL at 23:41

## 2020-05-11 RX ADMIN — SALINE NASAL SPRAY 1 SPRAY: 1.5 SOLUTION NASAL at 16:43

## 2020-05-11 RX ADMIN — SODIUM CHLORIDE, PRESERVATIVE FREE 10 ML: 5 INJECTION INTRAVENOUS at 07:55

## 2020-05-11 RX ADMIN — LIDOCAINE HYDROCHLORIDE: 20 SOLUTION ORAL; TOPICAL at 16:47

## 2020-05-11 RX ADMIN — METOCLOPRAMIDE 5 MG: 5 TABLET ORAL at 20:08

## 2020-05-11 RX ADMIN — SODIUM CHLORIDE, PRESERVATIVE FREE 10 ML: 5 INJECTION INTRAVENOUS at 20:08

## 2020-05-11 RX ADMIN — CETIRIZINE HYDROCHLORIDE 5 MG: 1 SOLUTION ORAL at 10:05

## 2020-05-11 RX ADMIN — ONDANSETRON 4 MG: 2 INJECTION INTRAMUSCULAR; INTRAVENOUS at 07:55

## 2020-05-11 RX ADMIN — PANTOPRAZOLE SODIUM 40 MG: 40 TABLET, DELAYED RELEASE ORAL at 16:40

## 2020-05-11 RX ADMIN — LORAZEPAM 0.5 MG: 0.5 TABLET ORAL at 20:08

## 2020-05-11 RX ADMIN — Medication 6 MILLICURIE: at 08:09

## 2020-05-11 RX ADMIN — PANTOPRAZOLE SODIUM 40 MG: 40 TABLET, DELAYED RELEASE ORAL at 10:21

## 2020-05-11 RX ADMIN — LORAZEPAM 0.5 MG: 0.5 TABLET ORAL at 10:05

## 2020-05-11 ASSESSMENT — PAIN SCALES - GENERAL
PAINLEVEL_OUTOF10: 0
PAINLEVEL_OUTOF10: 0

## 2020-05-11 NOTE — ANESTHESIA PRE PROCEDURE
injection 4 mg  4 mg Intravenous Q6H PRN [facility]   4 mg at 05/11/20 0755    enoxaparin (LOVENOX) injection 40 mg  40 mg Subcutaneous Daily TAMARA Butcher CNP        aluminum & magnesium hydroxide-simethicone (MAALOX) 13 mL, lidocaine viscous hcl (XYLOCAINE) 2 mL (GI COCKTAIL)   Oral 4x Daily TAMARA Butcher CNP        metoclopramide (REGLAN) tablet 5 mg  5 mg Oral Nightly Aries Amado DO   5 mg at 05/10/20 2102    sucralfate (CARAFATE) tablet 1 g  1 g Oral 2 times per day Marilyn Amado DO   Stopped at 05/11/20 2100    sodium chloride (OCEAN, BABY AYR) 0.65 % nasal spray 1 spray  1 spray Each Nostril PRN Marilyn Amado DO   1 spray at 05/10/20 2122    phenylephrine (MASOOD-SYNEPHRINE) 1 % nasal spray 1 spray  1 spray Each Nostril Q6H PRN Marilyn Amado DO   1 spray at 05/09/20 1627    cetirizine (ZYRTEC) syrup 5 mg  5 mg Oral Daily Aries Amado DO   5 mg at 05/11/20 1005    pantoprazole (PROTONIX) tablet 40 mg  40 mg Oral BID AC Aries Amado DO   40 mg at 05/11/20 1021    LORazepam (ATIVAN) tablet 0.5 mg  0.5 mg Oral BID Marilyn Amado DO   0.5 mg at 05/11/20 1005       Allergies:  No Known Allergies    Problem List:    Patient Active Problem List   Diagnosis Code    Intractable vomiting with nausea R11.2    History of hiatal hernia Z87.19    Intractable nausea and vomiting R11.2    Renal mass, right N28.89    Asymptomatic gallstones K80.20    Hemangioma of liver D18.03    Kidney stone N20.0    Anxiety F41.9    B12 deficiency E53.8       Past Medical History:        Diagnosis Date    Allergic rhinitis     Anxiety     Asymptomatic gallstones 2020    B12 deficiency 5/10/2020    Hemangioma of liver     Hernia, hiatal     Intractable vomiting with nausea 5/9/2020    Kidney stone     Renal mass, right     Exophytic 11 x 6 mm       Past Surgical History:        Procedure Laterality Date    COLONOSCOPY  2017    Negative findings   

## 2020-05-11 NOTE — PROGRESS NOTES
CCK, duration of HIDA scan etc. etc. all answered at length. Temperature 98.0 respirations 14 pulse 85 blood pressure 127/80.  99% saturation on room air. Sodium 139 potassium 3.6 chloride 105 CO2 26 BUN 17 creatinine 0.8 magnesium 2.4 glucose 95 calcium 9.3 phosphorus 3.3 protein 6.2 albumin 3.9 pro calcitonin 0.05. CRP less than 0.1. Liver functions normal.  HIDA scan with following results--    Findings:    The patient was injected with 8.2 mCi of technetium mebrofenin. Clearance of radiotracer from the blood pool was normal    Excretion of radiotracer from the liver appears to be normal    Excretion of radiotracer into the biliary tree appears to be normal    Excretion of tracer into the small bowel appears to be normal.    The gallbladder was visualized          Impression:       1.  Unremarkable study             ROS:  Negative to a 10 system review except that mentioned in the HPI. Objective:     PHYSICAL EXAM:    VS: /80   Pulse 85   Temp 98 °F (36.7 °C) (Oral)   Resp 14   Ht 5' 2\" (1.575 m)   Wt 142 lb (64.4 kg)   SpO2 99%   BMI 25.97 kg/m²   General appearance: Alert, Awake, Oriented times 3, no distress; anxious   Skin: Warm and dry ; no rashes  Head: Normocephalic. No masses, lesions or tenderness noted  Eyes: Conjunctivae pink, sclera white. PERRL,EOM-I  Ears: External ears normal  Nose/Sinuses: Nares normal. Septum midline. Mucosa normal. No drainage  Oropharynx: Oropharynx clear with no exudate seen  Neck: Supple. No jugular venous distension, lymphadenopathy or thyromegaly Trachea midline  Lungs: Clear to auscultation bilaterally. No rhonchi, crackles or wheezes  Heart: S1 S2  Regular rate and rhythm. No rub, murmur or gallop  Abdomen: Soft, minimal tenderness epigastrium left upper quadrant; no tenderness right upper quadrant. . BS normal. No masses, organomegaly; no rebound or guarding  Extremities: No edema, Peripheral pulses palpable  Musculoskeletal: Muscular strength Diagnosis    B12 deficiency [E53.8]    Intractable vomiting with nausea [R11.2]    History of hiatal hernia [Z87.19]    Intractable nausea and vomiting [R11.2]    Renal mass, right [N28.89]    Asymptomatic gallstones [K80.20]    Hemangioma of liver [D18.03]    Kidney stone [N20.0]    Anxiety [F41.9]                 ------------  INFORMATION  -----------      DIET:DIET CLEAR LIQUID;  Diet NPO, After Midnight Exceptions are: Ice Chips      No Known Allergies      MEDICATION SIDE EFFECTS:none       SCHEDULED MEDS:                                 Current Facility-Administered Medications   Medication Dose Route Frequency Provider Last Rate Last Dose    sodium chloride flush 0.9 % injection 10 mL  10 mL Intravenous 2 times per day TAMARA Honeycutt - CNP   10 mL at 05/11/20 0755    sodium chloride flush 0.9 % injection 10 mL  10 mL Intravenous PRN Aiyana Sick Humfleejey, TAMARA - YONI        acetaminophen (TYLENOL) tablet 650 mg  650 mg Oral Q6H PRN Aiyana Sick Humfleejey, TAMARA - CNP        Or    acetaminophen (TYLENOL) suppository 650 mg  650 mg Rectal Q6H [facility], TAMARA - CNP        magnesium hydroxide (MILK OF MAGNESIA) 400 MG/5ML suspension 30 mL  30 mL Oral Daily PRGUILLERMO Bronson Sick Humfleejey, TAMARA Rubio CNP        promethazine (PHENERGAN) tablet 12.5 mg  12.5 mg Oral Q6H PRN Aiyana Sick Humfleejey, TAMARA - CNP   12.5 mg at 05/09/20 1844    Or    ondansetron (ZOFRAN) injection 4 mg  4 mg Intravenous Q6H PRN Aiyana Sick Humfleejey, TAMARA - CNP   4 mg at 05/11/20 0755    enoxaparin (LOVENOX) injection 40 mg  40 mg Subcutaneous Daily TAMARA Butcher CNP        aluminum & magnesium hydroxide-simethicone (MAALOX) 13 mL, lidocaine viscous hcl (XYLOCAINE) 2 mL (GI COCKTAIL)   Oral 4x Daily TAMARA Butcher CNP        metoclopramide (REGLAN) tablet 5 mg  5 mg Oral Nightly Aries Amado DO   5 mg at 05/10/20 2102    sucralfate (CARAFATE) tablet 1 g  1 g Oral 2 times per

## 2020-05-11 NOTE — PROGRESS NOTES
Nutrition Assessment    Type and Reason for Visit: Consult     consult noted for \"restrictive diet\" - low fiber diet ordered previously, however pt is currently on CLD. Diet education not appropriate at this time. Will f/up per policy.     Electronically signed by Lalit Sosa MS, RD, LD on 5/11/20 at 12:01 PM EDT    Contact Number: [phone]

## 2020-05-12 ENCOUNTER — ANESTHESIA (OUTPATIENT)
Dept: OPERATING ROOM | Age: 65
DRG: 263 | End: 2020-05-12
Payer: COMMERCIAL

## 2020-05-12 ENCOUNTER — APPOINTMENT (OUTPATIENT)
Dept: GENERAL RADIOLOGY | Age: 65
DRG: 263 | End: 2020-05-12
Payer: COMMERCIAL

## 2020-05-12 VITALS
OXYGEN SATURATION: 100 % | SYSTOLIC BLOOD PRESSURE: 136 MMHG | DIASTOLIC BLOOD PRESSURE: 63 MMHG | RESPIRATION RATE: 3 BRPM | TEMPERATURE: 96.4 F

## 2020-05-12 LAB
ALBUMIN SERPL-MCNC: 3.9 G/DL (ref 3.5–5.2)
ALP BLD-CCNC: 52 U/L (ref 35–104)
ALT SERPL-CCNC: 12 U/L (ref 0–32)
ANION GAP SERPL CALCULATED.3IONS-SCNC: 11 MMOL/L (ref 7–16)
AST SERPL-CCNC: 13 U/L (ref 0–31)
BILIRUB SERPL-MCNC: 0.7 MG/DL (ref 0–1.2)
BILIRUBIN DIRECT: <0.2 MG/DL (ref 0–0.3)
BILIRUBIN, INDIRECT: ABNORMAL MG/DL (ref 0–1)
BUN BLDV-MCNC: 14 MG/DL (ref 8–23)
CALCIUM SERPL-MCNC: 9.3 MG/DL (ref 8.6–10.2)
CHLORIDE BLD-SCNC: 105 MMOL/L (ref 98–107)
CO2: 24 MMOL/L (ref 22–29)
CREAT SERPL-MCNC: 0.8 MG/DL (ref 0.5–1)
GFR AFRICAN AMERICAN: >60
GFR NON-AFRICAN AMERICAN: >60 ML/MIN/1.73
GLUCOSE BLD-MCNC: 100 MG/DL (ref 74–99)
MAGNESIUM: 2.5 MG/DL (ref 1.6–2.6)
PHOSPHORUS: 4.3 MG/DL (ref 2.5–4.5)
POTASSIUM SERPL-SCNC: 3.9 MMOL/L (ref 3.5–5)
SODIUM BLD-SCNC: 140 MMOL/L (ref 132–146)
TOTAL PROTEIN: 6.3 G/DL (ref 6.4–8.3)

## 2020-05-12 PROCEDURE — 6360000002 HC RX W HCPCS: Performed by: NURSE ANESTHETIST, CERTIFIED REGISTERED

## 2020-05-12 PROCEDURE — 0FT44ZZ RESECTION OF GALLBLADDER, PERCUTANEOUS ENDOSCOPIC APPROACH: ICD-10-PCS | Performed by: SURGERY

## 2020-05-12 PROCEDURE — 6360000002 HC RX W HCPCS

## 2020-05-12 PROCEDURE — 7100000001 HC PACU RECOVERY - ADDTL 15 MIN: Performed by: SURGERY

## 2020-05-12 PROCEDURE — 6360000002 HC RX W HCPCS: Performed by: ANESTHESIOLOGY

## 2020-05-12 PROCEDURE — 2709999900 HC NON-CHARGEABLE SUPPLY: Performed by: SURGERY

## 2020-05-12 PROCEDURE — 96375 TX/PRO/DX INJ NEW DRUG ADDON: CPT

## 2020-05-12 PROCEDURE — S2900 ROBOTIC SURGICAL SYSTEM: HCPCS | Performed by: SURGERY

## 2020-05-12 PROCEDURE — 83735 ASSAY OF MAGNESIUM: CPT

## 2020-05-12 PROCEDURE — 2580000003 HC RX 258: Performed by: NURSE PRACTITIONER

## 2020-05-12 PROCEDURE — 3700000000 HC ANESTHESIA ATTENDED CARE: Performed by: SURGERY

## 2020-05-12 PROCEDURE — 6370000000 HC RX 637 (ALT 250 FOR IP): Performed by: ANESTHESIOLOGY

## 2020-05-12 PROCEDURE — 80048 BASIC METABOLIC PNL TOTAL CA: CPT

## 2020-05-12 PROCEDURE — 2500000003 HC RX 250 WO HCPCS: Performed by: NURSE ANESTHETIST, CERTIFIED REGISTERED

## 2020-05-12 PROCEDURE — 6370000000 HC RX 637 (ALT 250 FOR IP): Performed by: STUDENT IN AN ORGANIZED HEALTH CARE EDUCATION/TRAINING PROGRAM

## 2020-05-12 PROCEDURE — 36415 COLL VENOUS BLD VENIPUNCTURE: CPT

## 2020-05-12 PROCEDURE — 3600000009 HC SURGERY ROBOT BASE: Performed by: SURGERY

## 2020-05-12 PROCEDURE — 2700000000 HC OXYGEN THERAPY PER DAY

## 2020-05-12 PROCEDURE — 3700000001 HC ADD 15 MINUTES (ANESTHESIA): Performed by: SURGERY

## 2020-05-12 PROCEDURE — 8E0W4CZ ROBOTIC ASSISTED PROCEDURE OF TRUNK REGION, PERCUTANEOUS ENDOSCOPIC APPROACH: ICD-10-PCS | Performed by: SURGERY

## 2020-05-12 PROCEDURE — 88304 TISSUE EXAM BY PATHOLOGIST: CPT

## 2020-05-12 PROCEDURE — G0378 HOSPITAL OBSERVATION PER HR: HCPCS

## 2020-05-12 PROCEDURE — 6360000002 HC RX W HCPCS: Performed by: STUDENT IN AN ORGANIZED HEALTH CARE EDUCATION/TRAINING PROGRAM

## 2020-05-12 PROCEDURE — 7100000000 HC PACU RECOVERY - FIRST 15 MIN: Performed by: SURGERY

## 2020-05-12 PROCEDURE — 84100 ASSAY OF PHOSPHORUS: CPT

## 2020-05-12 PROCEDURE — 2580000003 HC RX 258: Performed by: NURSE ANESTHETIST, CERTIFIED REGISTERED

## 2020-05-12 PROCEDURE — 80076 HEPATIC FUNCTION PANEL: CPT

## 2020-05-12 PROCEDURE — 3600000019 HC SURGERY ROBOT ADDTL 15MIN: Performed by: SURGERY

## 2020-05-12 PROCEDURE — 6370000000 HC RX 637 (ALT 250 FOR IP)

## 2020-05-12 PROCEDURE — 2580000003 HC RX 258: Performed by: STUDENT IN AN ORGANIZED HEALTH CARE EDUCATION/TRAINING PROGRAM

## 2020-05-12 PROCEDURE — 96376 TX/PRO/DX INJ SAME DRUG ADON: CPT

## 2020-05-12 RX ORDER — SCOLOPAMINE TRANSDERMAL SYSTEM 1 MG/1
1 PATCH, EXTENDED RELEASE TRANSDERMAL
Status: DISCONTINUED | OUTPATIENT
Start: 2020-05-12 | End: 2020-05-15 | Stop reason: HOSPADM

## 2020-05-12 RX ORDER — LIDOCAINE HYDROCHLORIDE 20 MG/ML
INJECTION, SOLUTION EPIDURAL; INFILTRATION; INTRACAUDAL; PERINEURAL PRN
Status: DISCONTINUED | OUTPATIENT
Start: 2020-05-12 | End: 2020-05-12 | Stop reason: SDUPTHER

## 2020-05-12 RX ORDER — NEOSTIGMINE METHYLSULFATE 1 MG/ML
INJECTION, SOLUTION INTRAVENOUS PRN
Status: DISCONTINUED | OUTPATIENT
Start: 2020-05-12 | End: 2020-05-12 | Stop reason: SDUPTHER

## 2020-05-12 RX ORDER — ROCURONIUM BROMIDE 10 MG/ML
INJECTION, SOLUTION INTRAVENOUS PRN
Status: DISCONTINUED | OUTPATIENT
Start: 2020-05-12 | End: 2020-05-12 | Stop reason: SDUPTHER

## 2020-05-12 RX ORDER — OXYCODONE HYDROCHLORIDE AND ACETAMINOPHEN 5; 325 MG/1; MG/1
2 TABLET ORAL EVERY 4 HOURS PRN
Status: DISCONTINUED | OUTPATIENT
Start: 2020-05-12 | End: 2020-05-15 | Stop reason: HOSPADM

## 2020-05-12 RX ORDER — DIPHENHYDRAMINE HYDROCHLORIDE 50 MG/ML
12.5 INJECTION INTRAMUSCULAR; INTRAVENOUS ONCE
Status: COMPLETED | OUTPATIENT
Start: 2020-05-12 | End: 2020-05-12

## 2020-05-12 RX ORDER — MIDAZOLAM HYDROCHLORIDE 1 MG/ML
INJECTION INTRAMUSCULAR; INTRAVENOUS
Status: COMPLETED
Start: 2020-05-12 | End: 2020-05-12

## 2020-05-12 RX ORDER — PROCHLORPERAZINE EDISYLATE 5 MG/ML
5 INJECTION INTRAMUSCULAR; INTRAVENOUS
Status: COMPLETED | OUTPATIENT
Start: 2020-05-12 | End: 2020-05-12

## 2020-05-12 RX ORDER — FENTANYL CITRATE 50 UG/ML
INJECTION, SOLUTION INTRAMUSCULAR; INTRAVENOUS PRN
Status: DISCONTINUED | OUTPATIENT
Start: 2020-05-12 | End: 2020-05-12 | Stop reason: SDUPTHER

## 2020-05-12 RX ORDER — DIMETHICONE, OXYBENZONE, AND PADIMATE O 2; 2.5; 6.6 G/100G; G/100G; G/100G
STICK TOPICAL
Status: COMPLETED
Start: 2020-05-12 | End: 2020-05-12

## 2020-05-12 RX ORDER — GLYCOPYRROLATE 1 MG/5 ML
SYRINGE (ML) INTRAVENOUS PRN
Status: DISCONTINUED | OUTPATIENT
Start: 2020-05-12 | End: 2020-05-12 | Stop reason: SDUPTHER

## 2020-05-12 RX ORDER — MEPERIDINE HYDROCHLORIDE 25 MG/ML
12.5 INJECTION INTRAMUSCULAR; INTRAVENOUS; SUBCUTANEOUS EVERY 5 MIN PRN
Status: DISCONTINUED | OUTPATIENT
Start: 2020-05-12 | End: 2020-05-12 | Stop reason: HOSPADM

## 2020-05-12 RX ORDER — OXYCODONE HYDROCHLORIDE AND ACETAMINOPHEN 5; 325 MG/1; MG/1
1 TABLET ORAL EVERY 6 HOURS PRN
Qty: 12 TABLET | Refills: 0 | Status: SHIPPED | OUTPATIENT
Start: 2020-05-12 | End: 2020-05-15

## 2020-05-12 RX ORDER — DEXAMETHASONE SODIUM PHOSPHATE 4 MG/ML
INJECTION, SOLUTION INTRA-ARTICULAR; INTRALESIONAL; INTRAMUSCULAR; INTRAVENOUS; SOFT TISSUE PRN
Status: DISCONTINUED | OUTPATIENT
Start: 2020-05-12 | End: 2020-05-12 | Stop reason: SDUPTHER

## 2020-05-12 RX ORDER — DIPHENHYDRAMINE HYDROCHLORIDE 50 MG/ML
INJECTION INTRAMUSCULAR; INTRAVENOUS
Status: COMPLETED
Start: 2020-05-12 | End: 2020-05-12

## 2020-05-12 RX ORDER — PROPOFOL 10 MG/ML
INJECTION, EMULSION INTRAVENOUS PRN
Status: DISCONTINUED | OUTPATIENT
Start: 2020-05-12 | End: 2020-05-12 | Stop reason: SDUPTHER

## 2020-05-12 RX ORDER — ONDANSETRON 2 MG/ML
INJECTION INTRAMUSCULAR; INTRAVENOUS PRN
Status: DISCONTINUED | OUTPATIENT
Start: 2020-05-12 | End: 2020-05-12 | Stop reason: SDUPTHER

## 2020-05-12 RX ORDER — INDOCYANINE GREEN AND WATER 25 MG
5 KIT INJECTION ONCE
Status: COMPLETED | OUTPATIENT
Start: 2020-05-12 | End: 2020-05-12

## 2020-05-12 RX ORDER — MIDAZOLAM HYDROCHLORIDE 1 MG/ML
2 INJECTION INTRAMUSCULAR; INTRAVENOUS ONCE
Status: COMPLETED | OUTPATIENT
Start: 2020-05-12 | End: 2020-05-12

## 2020-05-12 RX ORDER — SODIUM CHLORIDE, SODIUM LACTATE, POTASSIUM CHLORIDE, CALCIUM CHLORIDE 600; 310; 30; 20 MG/100ML; MG/100ML; MG/100ML; MG/100ML
INJECTION, SOLUTION INTRAVENOUS CONTINUOUS PRN
Status: DISCONTINUED | OUTPATIENT
Start: 2020-05-12 | End: 2020-05-12 | Stop reason: SDUPTHER

## 2020-05-12 RX ORDER — CEFAZOLIN SODIUM 2 G/50ML
SOLUTION INTRAVENOUS
Status: COMPLETED
Start: 2020-05-12 | End: 2020-05-12

## 2020-05-12 RX ORDER — FENTANYL CITRATE 50 UG/ML
INJECTION, SOLUTION INTRAMUSCULAR; INTRAVENOUS PRN
Status: DISCONTINUED | OUTPATIENT
Start: 2020-05-12 | End: 2020-05-12

## 2020-05-12 RX ORDER — MIDAZOLAM HYDROCHLORIDE 1 MG/ML
INJECTION INTRAMUSCULAR; INTRAVENOUS PRN
Status: DISCONTINUED | OUTPATIENT
Start: 2020-05-12 | End: 2020-05-12 | Stop reason: SDUPTHER

## 2020-05-12 RX ORDER — OXYCODONE HYDROCHLORIDE AND ACETAMINOPHEN 5; 325 MG/1; MG/1
1 TABLET ORAL EVERY 4 HOURS PRN
Status: DISCONTINUED | OUTPATIENT
Start: 2020-05-12 | End: 2020-05-15 | Stop reason: HOSPADM

## 2020-05-12 RX ORDER — INDOCYANINE GREEN AND WATER 25 MG
KIT INJECTION
Status: COMPLETED
Start: 2020-05-12 | End: 2020-05-12

## 2020-05-12 RX ADMIN — DIPHENHYDRAMINE HYDROCHLORIDE 12.5 MG: 50 INJECTION, SOLUTION INTRAMUSCULAR; INTRAVENOUS at 17:22

## 2020-05-12 RX ADMIN — Medication: at 18:55

## 2020-05-12 RX ADMIN — MIDAZOLAM HYDROCHLORIDE 2 MG: 1 INJECTION INTRAMUSCULAR; INTRAVENOUS at 13:53

## 2020-05-12 RX ADMIN — SUCRALFATE 1 G: 1 TABLET ORAL at 20:00

## 2020-05-12 RX ADMIN — LIDOCAINE HYDROCHLORIDE 100 MG: 20 INJECTION, SOLUTION EPIDURAL; INFILTRATION; INTRACAUDAL; PERINEURAL at 15:19

## 2020-05-12 RX ADMIN — Medication 3 MG: at 16:14

## 2020-05-12 RX ADMIN — OXYCODONE HYDROCHLORIDE AND ACETAMINOPHEN 1 TABLET: 5; 325 TABLET ORAL at 20:00

## 2020-05-12 RX ADMIN — MIDAZOLAM HYDROCHLORIDE 2 MG: 1 INJECTION, SOLUTION INTRAMUSCULAR; INTRAVENOUS at 13:53

## 2020-05-12 RX ADMIN — INDOCYANINE GREEN AND WATER 5 MG: KIT at 15:07

## 2020-05-12 RX ADMIN — ONDANSETRON HYDROCHLORIDE 4 MG: 2 INJECTION, SOLUTION INTRAMUSCULAR; INTRAVENOUS at 15:30

## 2020-05-12 RX ADMIN — SODIUM CHLORIDE, PRESERVATIVE FREE 10 ML: 5 INJECTION INTRAVENOUS at 20:01

## 2020-05-12 RX ADMIN — MIDAZOLAM 1 MG: 1 INJECTION INTRAMUSCULAR; INTRAVENOUS at 15:12

## 2020-05-12 RX ADMIN — PROPOFOL 120 MG: 10 INJECTION, EMULSION INTRAVENOUS at 15:19

## 2020-05-12 RX ADMIN — PHENYLEPHRINE HYDROCHLORIDE 200 MCG: 10 INJECTION INTRAVENOUS at 15:26

## 2020-05-12 RX ADMIN — PHENYLEPHRINE HYDROCHLORIDE 100 MCG: 10 INJECTION INTRAVENOUS at 15:44

## 2020-05-12 RX ADMIN — SODIUM CHLORIDE, PRESERVATIVE FREE 10 ML: 5 INJECTION INTRAVENOUS at 08:45

## 2020-05-12 RX ADMIN — CEFAZOLIN SODIUM 2 G: 2 SOLUTION INTRAVENOUS at 15:12

## 2020-05-12 RX ADMIN — METOCLOPRAMIDE 5 MG: 5 TABLET ORAL at 20:01

## 2020-05-12 RX ADMIN — FENTANYL CITRATE 100 MCG: 50 INJECTION, SOLUTION INTRAMUSCULAR; INTRAVENOUS at 15:39

## 2020-05-12 RX ADMIN — PROMETHAZINE HYDROCHLORIDE 12.5 MG: 25 TABLET ORAL at 20:00

## 2020-05-12 RX ADMIN — ROCURONIUM BROMIDE 30 MG: 10 INJECTION, SOLUTION INTRAVENOUS at 15:19

## 2020-05-12 RX ADMIN — FENTANYL CITRATE 100 MCG: 50 INJECTION, SOLUTION INTRAMUSCULAR; INTRAVENOUS at 15:19

## 2020-05-12 RX ADMIN — LORAZEPAM 0.5 MG: 0.5 TABLET ORAL at 21:00

## 2020-05-12 RX ADMIN — DIPHENHYDRAMINE HYDROCHLORIDE 12.5 MG: 50 INJECTION INTRAMUSCULAR; INTRAVENOUS at 17:22

## 2020-05-12 RX ADMIN — DEXAMETHASONE SODIUM PHOSPHATE 8 MG: 4 INJECTION, SOLUTION INTRAMUSCULAR; INTRAVENOUS at 15:30

## 2020-05-12 RX ADMIN — PROCHLORPERAZINE EDISYLATE 5 MG: 5 INJECTION INTRAMUSCULAR; INTRAVENOUS at 17:54

## 2020-05-12 RX ADMIN — SODIUM CHLORIDE, POTASSIUM CHLORIDE, SODIUM LACTATE AND CALCIUM CHLORIDE: 600; 310; 30; 20 INJECTION, SOLUTION INTRAVENOUS at 14:20

## 2020-05-12 RX ADMIN — Medication 0.4 MG: at 16:14

## 2020-05-12 RX ADMIN — FENTANYL CITRATE 50 MCG: 50 INJECTION, SOLUTION INTRAMUSCULAR; INTRAVENOUS at 15:34

## 2020-05-12 ASSESSMENT — PAIN DESCRIPTION - PAIN TYPE
TYPE: SURGICAL PAIN

## 2020-05-12 ASSESSMENT — PULMONARY FUNCTION TESTS
PIF_VALUE: 1
PIF_VALUE: 14
PIF_VALUE: 1
PIF_VALUE: 19
PIF_VALUE: 18
PIF_VALUE: 2
PIF_VALUE: 18
PIF_VALUE: 15
PIF_VALUE: 13
PIF_VALUE: 19
PIF_VALUE: 15
PIF_VALUE: 19
PIF_VALUE: 2
PIF_VALUE: 22
PIF_VALUE: 18
PIF_VALUE: 18
PIF_VALUE: 19
PIF_VALUE: 3
PIF_VALUE: 19
PIF_VALUE: 19
PIF_VALUE: 2
PIF_VALUE: 19
PIF_VALUE: 14
PIF_VALUE: 19
PIF_VALUE: 2
PIF_VALUE: 3
PIF_VALUE: 15
PIF_VALUE: 17
PIF_VALUE: 14
PIF_VALUE: 1
PIF_VALUE: 18
PIF_VALUE: 18
PIF_VALUE: 19
PIF_VALUE: 18
PIF_VALUE: 19
PIF_VALUE: 3
PIF_VALUE: 19
PIF_VALUE: 17
PIF_VALUE: 19
PIF_VALUE: 19
PIF_VALUE: 16
PIF_VALUE: 19
PIF_VALUE: 14
PIF_VALUE: 17
PIF_VALUE: 19
PIF_VALUE: 15
PIF_VALUE: 18
PIF_VALUE: 18
PIF_VALUE: 1
PIF_VALUE: 17
PIF_VALUE: 3
PIF_VALUE: 16
PIF_VALUE: 17
PIF_VALUE: 18
PIF_VALUE: 19
PIF_VALUE: 15
PIF_VALUE: 16
PIF_VALUE: 17
PIF_VALUE: 17
PIF_VALUE: 14
PIF_VALUE: 18
PIF_VALUE: 15
PIF_VALUE: 1
PIF_VALUE: 19
PIF_VALUE: 20
PIF_VALUE: 15
PIF_VALUE: 19
PIF_VALUE: 2
PIF_VALUE: 17
PIF_VALUE: 19

## 2020-05-12 ASSESSMENT — PAIN DESCRIPTION - DESCRIPTORS: DESCRIPTORS: DISCOMFORT

## 2020-05-12 ASSESSMENT — PAIN SCALES - GENERAL
PAINLEVEL_OUTOF10: 3
PAINLEVEL_OUTOF10: 5
PAINLEVEL_OUTOF10: 0
PAINLEVEL_OUTOF10: 3
PAINLEVEL_OUTOF10: 0
PAINLEVEL_OUTOF10: 3
PAINLEVEL_OUTOF10: 0
PAINLEVEL_OUTOF10: 5

## 2020-05-12 ASSESSMENT — PAIN DESCRIPTION - FREQUENCY: FREQUENCY: INTERMITTENT

## 2020-05-12 ASSESSMENT — PAIN DESCRIPTION - LOCATION: LOCATION: ABDOMEN

## 2020-05-12 NOTE — PROGRESS NOTES
ears normal  Nose/Sinuses: Nares normal. Septum midline. Mucosa normal. No drainage  Oropharynx: Oropharynx clear with no exudate seen  Neck: Supple. No jugular venous distension, lymphadenopathy or thyromegaly Trachea midline  Lungs: Clear to auscultation bilaterally. No rhonchi, crackles or wheezes  Heart: S1 S2  Regular rate and rhythm. No rub, murmur or gallop  Abdomen: Soft, minimal tenderness epigastrium left upper quadrant; no tenderness right upper quadrant. . BS normal. No masses, organomegaly; no rebound or guarding  Extremities: No edema, Peripheral pulses palpable  Musculoskeletal: Muscular strength appears intact. Neuro:  No focal motor defects ; II-XII grossly intact . [place] equally    TELEMETRY: REVIEWED--Telemetry: Sinus    ASSESSMENT:   Principal Problem:    Intractable vomiting with nausea  Active Problems:    History of hiatal hernia    Intractable nausea and vomiting    Renal mass, right    Asymptomatic gallstones    Hemangioma of liver    Kidney stone    Anxiety    B12 deficiency  Resolved Problems:    * No resolved hospital problems. *      PLAN:  SEE ORDERS      RE  CHANGES AND FINDINGS   Medications reviewed with patient  GI prophylaxis  DVT prophylaxis  Consultants notes reviewed    Cyanocobalamin 1000 mcg IM today  Patient wants to discuss HIDA scan with her children before proceeding  Extended discussion with her regarding sublingual B12 upon discharge  Extended discussion with her regarding HIDA scan, HIDA scan with CCK etc.  Extended discussion with her, she had EGD 1/2020 that was \"unchanged\" from previous EGDs. No reason to repeat such at this time.   Extended discussion regarding cholecystitis that causes only nausea and emesis but no pain  Continue saline nasal spray  Continue Protonix 40 mg twice daily  Continue Reglan 5 mg at bedtime  Discontinue Tigan 3 times a day  Await dietary consult  Patient is now scheduled for robotic laparoscopic cholecystectomy  I discussed with Oral Nightly    sucralfate  1 g Oral 2 times per day    cetirizine  5 mg Oral Daily    pantoprazole  40 mg Oral BID AC    LORazepam  0.5 mg Oral BID       Continuous Infusions:      Data:   Temperature:  Current - Temp: 98.5 °F (36.9 °C); Max - Temp  Av.3 °F (36.8 °C)  Min: 98.1 °F (36.7 °C)  Max: 98.5 °F (36.9 °C)    Respiratory Rate : Resp  Av  Min: 16  Max: 16    Pulse Range: Pulse  Av.5  Min: 88  Max: 89    Blood Presuure Range:  Systolic (89AZU), JUT:959 , Min:119 , MCU:865   ; Diastolic (37ONV), YHD:53, Min:71, Max:80      Pulse ox Range: SpO2  Av.5 %  Min: 97 %  Max: 98 %    Patient Vitals for the past 8 hrs:   BP Temp Temp src Pulse Resp SpO2   20 0841 119/71 98.5 °F (36.9 °C) Oral 88 16 97 %         Intake/Output Summary (Last 24 hours) at 2020 1255  Last data filed at 2020 1748  Gross per 24 hour   Intake 120 ml   Output 700 ml   Net -580 ml       I/O last 3 completed shifts: In: 120 [P.O.:120]  Out: 900 [Urine:900]    No intake/output data recorded.     Wt Readings from Last 3 Encounters:   20 142 lb (64.4 kg)   12/10/19 160 lb (72.6 kg)   19 170 lb (77.1 kg)       Labs:   CBC:   Lab Results   Component Value Date    WBC 5.4 05/10/2020    RBC 4.39 05/10/2020    HGB 13.6 05/10/2020    HCT 40.6 05/10/2020    MCV 92.5 05/10/2020    MCH 31.0 05/10/2020    MCHC 33.5 05/10/2020    RDW 12.3 05/10/2020     05/10/2020    MPV 9.4 05/10/2020     CBC with Differential:    Lab Results   Component Value Date    WBC 5.4 05/10/2020    RBC 4.39 05/10/2020    HGB 13.6 05/10/2020    HCT 40.6 05/10/2020     05/10/2020    MCV 92.5 05/10/2020    MCH 31.0 05/10/2020    MCHC 33.5 05/10/2020    RDW 12.3 05/10/2020    LYMPHOPCT 11.3 2020    MONOPCT 4.8 2020    BASOPCT 0.2 2020    MONOSABS 0.48 2020    LYMPHSABS 1.13 2020    EOSABS 0.00 2020    BASOSABS 0.02 2020     Hemoglobin/Hematocrit:    Lab Results   Component Value

## 2020-05-12 NOTE — PROGRESS NOTES
NPO  · Oral Nutrition Supplement (ONS) Orders: None  · ONS intake: (n/a)  · Anthropometric Measures:  · Ht: 5' 2\" (157.5 cm)   · Current Body Wt: 142 lb (64.4 kg)(bed 5/11)  · Admission Body Wt: 144 lb (65.3 kg)(bed 5/9)  · Usual Body Wt: 160 lb (72.6 kg)(stated 12/10/19 per EMR; poor EMR hx w/ no past actual wt's noted at this time)  · % Weight Change:  DENZEL properly at this time d/t poor EMR wt hx w/ no noted actual wt's recorded pta  · Ideal Body Wt: 110 lb (49.9 kg), % Ideal Body 129% per CBW  · BMI Classification: BMI 25.0 - 29.9 Overweight    Nutrition Interventions:   Continue NPO(Continue NPO and then ADAT post surgery to General Diet (no needs for restricted diet if pt only w/ cholelithiasis). Will Start Ensure Clear ONS TID w/ diet.  )  Continued Inpatient Monitoring, Education not appropriate at this time    Nutrition Evaluation:   · Evaluation: Goals set   · Goals: Nutrition Progression.       · Monitoring: Nutrition Progression, Meal Intake, Supplement Intake, Diet Tolerance, Skin Integrity, I&O, Weight, Pertinent Labs, Nausea or Vomiting, Monitor Bowel Function      Electronically signed by Tata Hinojosa RD, LD on 5/12/20 at 10:18 AM EDT    Contact Number: ext [phone]

## 2020-05-12 NOTE — OP NOTE
OPERATIVE NOTE    DATE OF PROCEDURE: 5/12/2020     SURGEON: Tari Koenig MD    ASSISTANT: Leopoldo Rasp, MD PGY 5    PREOPERATIVE DIAGNOSIS: symptomatic cholelithiasis    POSTOPERATIVE DIAGNOSIS: same, acute cholecystitis    OPERATION: Robot assisted laparoscopic choelcystectomy    ANESTHESIA: General    ESTIMATED BLOOD LOSS:  55HZ     COMPLICATIONS: None     SPECIMENS: Was Obtained: Gallbladder     HISTORY: The patient is a 63y.o. year old female with history of above preop diagnosis. We explained the risk, benefits, expected outcome, and alternatives to the procedure. Patient understands and is in agreement. PROCEDURE:    The patient was brought to the operating room and positioned supine on the OR table. Sequential compression devices were placed on the patient's lower extremities and functioning. Preoperative antibiotics were administered. Anesthesia was obtained without complication as per the anesthesia record. Immediately prior to the procedure a time-out was called and the surgical checklist was reviewed and agreed upon by all present. The patient was prepped and draped in the usual sterile fashion and the procedure went forth with strict aseptic technique under maximal barrier precautions. A stab incision was made in the Left upper quadrant at arrington's point. A Veress needle was inserted and confirmed to be in place with the saline drip test. The abdomen was insufflated to 15 mmHg and an 8mm robotic port was then inserted supraumbilical. A camera was then inserted through the port. The abdomen was inspected. In a similar fashion, three more 8mm ports were inserted, one in the left abdomen and two in the right lateral abdomen. The robot was docked and prograsp foreceps as well as monopolar cautery were inserted. Electrocautery was used to dissect the gallbladder off its peritoneal attachments. The base of the gallbladder was then identified and the cystic duct was skeletonized.  The cystic

## 2020-05-12 NOTE — ANESTHESIA POSTPROCEDURE EVALUATION
Department of Anesthesiology  Postprocedure Note    Patient: Montse Mauro  MRN: [id number]  YOB: 1955  Date of evaluation: 5/12/2020  Time:  7:33 PM     Procedure Summary     Date:  05/12/20 Room / Location:  [facility] 09 / [facility]    Anesthesia Start:  6835 Anesthesia Stop:  1632    Procedure:  LAPAROSCOPIC ROBOTIC ASSISTED CHOLECYSTECTOMY (N/A ) Diagnosis:  (-)    Surgeon:  Jackson Jones MD Responsible Provider:  Emilee Jenkins MD    Anesthesia Type:  general ASA Status:  2          Anesthesia Type: general    Gorge Phase I: Gorge Score: 9    Gorge Phase II:      Last vitals: Reviewed and per EMR flowsheets.        Anesthesia Post Evaluation    Patient location during evaluation: PACU  Patient participation: complete - patient participated  Level of consciousness: awake and alert  Airway patency: patent  Nausea & Vomiting: no vomiting and nausea  Complications: no  Cardiovascular status: hemodynamically stable  Respiratory status: acceptable  Hydration status: euvolemic  Comments: Persistent nausea despite extensive antiemetic therapy

## 2020-05-12 NOTE — PROGRESS NOTES
Dr Nicolás Shelton notified of c/o nausea unrelieved with previous medications. Orders received.  Order placed for ok to transfer to floor after scopalamine patch administered

## 2020-05-12 NOTE — PATIENT CARE CONFERENCE
[facility] Quality Flow/Interdisciplinary Rounds Progress Note        Quality Flow Rounds held on May 12, 2020    Disciplines Attending:  Bedside Nurse, ,  and Nursing Unit Leadership    Teto Weber was admitted on 5/8/2020  9:38 PM    Anticipated Discharge Date:  Expected Discharge Date: 05/13/20    Disposition:    Thor Score:  Thor Scale Score: 22    Readmission Risk              Risk of Unplanned Readmission:        0           Discussed patient goal for the day, patient clinical progression, and barriers to discharge.   The following Goal(s) of the Day/Commitment(s) have been identified:  Diagnostics - Report Results- OR today      Feng oNvak  May 12, 2020

## 2020-05-13 LAB
ALBUMIN SERPL-MCNC: 3.7 G/DL (ref 3.5–5.2)
ALP BLD-CCNC: 56 U/L (ref 35–104)
ALT SERPL-CCNC: 39 U/L (ref 0–32)
AMYLASE: 27 U/L (ref 20–100)
ANION GAP SERPL CALCULATED.3IONS-SCNC: 9 MMOL/L (ref 7–16)
AST SERPL-CCNC: 40 U/L (ref 0–31)
BASOPHILS ABSOLUTE: 0.01 E9/L (ref 0–0.2)
BASOPHILS RELATIVE PERCENT: 0.1 % (ref 0–2)
BILIRUB SERPL-MCNC: 0.8 MG/DL (ref 0–1.2)
BILIRUBIN DIRECT: <0.2 MG/DL (ref 0–0.3)
BILIRUBIN, INDIRECT: ABNORMAL MG/DL (ref 0–1)
BUN BLDV-MCNC: 15 MG/DL (ref 8–23)
CALCIUM SERPL-MCNC: 9.2 MG/DL (ref 8.6–10.2)
CHLORIDE BLD-SCNC: 104 MMOL/L (ref 98–107)
CO2: 25 MMOL/L (ref 22–29)
CREAT SERPL-MCNC: 0.7 MG/DL (ref 0.5–1)
EOSINOPHILS ABSOLUTE: 0 E9/L (ref 0.05–0.5)
EOSINOPHILS RELATIVE PERCENT: 0 % (ref 0–6)
GFR AFRICAN AMERICAN: >60
GFR NON-AFRICAN AMERICAN: >60 ML/MIN/1.73
GLUCOSE BLD-MCNC: 144 MG/DL (ref 74–99)
HCT VFR BLD CALC: 42.4 % (ref 34–48)
HEMOGLOBIN: 14.3 G/DL (ref 11.5–15.5)
IMMATURE GRANULOCYTES #: 0.04 E9/L
IMMATURE GRANULOCYTES %: 0.4 % (ref 0–5)
LIPASE: 13 U/L (ref 13–60)
LYMPHOCYTES ABSOLUTE: 1.47 E9/L (ref 1.5–4)
LYMPHOCYTES RELATIVE PERCENT: 12.9 % (ref 20–42)
MAGNESIUM: 2 MG/DL (ref 1.6–2.6)
MCH RBC QN AUTO: 31.4 PG (ref 26–35)
MCHC RBC AUTO-ENTMCNC: 33.7 % (ref 32–34.5)
MCV RBC AUTO: 93.2 FL (ref 80–99.9)
MONOCYTES ABSOLUTE: 0.59 E9/L (ref 0.1–0.95)
MONOCYTES RELATIVE PERCENT: 5.2 % (ref 2–12)
MRSA CULTURE ONLY: NORMAL
NEUTROPHILS ABSOLUTE: 9.3 E9/L (ref 1.8–7.3)
NEUTROPHILS RELATIVE PERCENT: 81.4 % (ref 43–80)
PDW BLD-RTO: 11.9 FL (ref 11.5–15)
PHOSPHORUS: 3.6 MG/DL (ref 2.5–4.5)
PLATELET # BLD: 201 E9/L (ref 130–450)
PMV BLD AUTO: 9.2 FL (ref 7–12)
POTASSIUM SERPL-SCNC: 4.3 MMOL/L (ref 3.5–5)
RBC # BLD: 4.55 E12/L (ref 3.5–5.5)
SODIUM BLD-SCNC: 138 MMOL/L (ref 132–146)
TOTAL PROTEIN: 6.2 G/DL (ref 6.4–8.3)
WBC # BLD: 11.4 E9/L (ref 4.5–11.5)

## 2020-05-13 PROCEDURE — 83690 ASSAY OF LIPASE: CPT

## 2020-05-13 PROCEDURE — 80076 HEPATIC FUNCTION PANEL: CPT

## 2020-05-13 PROCEDURE — 6370000000 HC RX 637 (ALT 250 FOR IP): Performed by: INTERNAL MEDICINE

## 2020-05-13 PROCEDURE — 6370000000 HC RX 637 (ALT 250 FOR IP): Performed by: STUDENT IN AN ORGANIZED HEALTH CARE EDUCATION/TRAINING PROGRAM

## 2020-05-13 PROCEDURE — 6360000002 HC RX W HCPCS: Performed by: STUDENT IN AN ORGANIZED HEALTH CARE EDUCATION/TRAINING PROGRAM

## 2020-05-13 PROCEDURE — 36415 COLL VENOUS BLD VENIPUNCTURE: CPT

## 2020-05-13 PROCEDURE — 84100 ASSAY OF PHOSPHORUS: CPT

## 2020-05-13 PROCEDURE — 80048 BASIC METABOLIC PNL TOTAL CA: CPT

## 2020-05-13 PROCEDURE — 85025 COMPLETE CBC W/AUTO DIFF WBC: CPT

## 2020-05-13 PROCEDURE — 82150 ASSAY OF AMYLASE: CPT

## 2020-05-13 PROCEDURE — 96376 TX/PRO/DX INJ SAME DRUG ADON: CPT

## 2020-05-13 PROCEDURE — 2580000003 HC RX 258: Performed by: STUDENT IN AN ORGANIZED HEALTH CARE EDUCATION/TRAINING PROGRAM

## 2020-05-13 PROCEDURE — G0378 HOSPITAL OBSERVATION PER HR: HCPCS

## 2020-05-13 PROCEDURE — 83735 ASSAY OF MAGNESIUM: CPT

## 2020-05-13 RX ORDER — IBUPROFEN 400 MG/1
400 TABLET ORAL EVERY 6 HOURS PRN
Status: DISCONTINUED | OUTPATIENT
Start: 2020-05-13 | End: 2020-05-15 | Stop reason: HOSPADM

## 2020-05-13 RX ORDER — BISACODYL 10 MG
10 SUPPOSITORY, RECTAL RECTAL DAILY PRN
Status: DISCONTINUED | OUTPATIENT
Start: 2020-05-13 | End: 2020-05-15 | Stop reason: HOSPADM

## 2020-05-13 RX ORDER — SORBITOL SOLUTION 70 %
30 SOLUTION, ORAL MISCELLANEOUS ONCE
Status: COMPLETED | OUTPATIENT
Start: 2020-05-13 | End: 2020-05-13

## 2020-05-13 RX ORDER — DOCUSATE SODIUM 100 MG/1
200 CAPSULE, LIQUID FILLED ORAL 2 TIMES DAILY
Status: DISCONTINUED | OUTPATIENT
Start: 2020-05-13 | End: 2020-05-15 | Stop reason: HOSPADM

## 2020-05-13 RX ORDER — SORBITOL SOLUTION 70 %
15 SOLUTION, ORAL MISCELLANEOUS ONCE
Status: DISCONTINUED | OUTPATIENT
Start: 2020-05-13 | End: 2020-05-13

## 2020-05-13 RX ORDER — SORBITOL SOLUTION 70 %
30 SOLUTION, ORAL MISCELLANEOUS ONCE
Status: DISCONTINUED | OUTPATIENT
Start: 2020-05-13 | End: 2020-05-13 | Stop reason: SDUPTHER

## 2020-05-13 RX ADMIN — ACETAMINOPHEN 650 MG: 325 TABLET ORAL at 19:16

## 2020-05-13 RX ADMIN — LORAZEPAM 0.5 MG: 0.5 TABLET ORAL at 08:16

## 2020-05-13 RX ADMIN — PANTOPRAZOLE SODIUM 40 MG: 40 TABLET, DELAYED RELEASE ORAL at 15:52

## 2020-05-13 RX ADMIN — PROMETHAZINE HYDROCHLORIDE 12.5 MG: 25 TABLET ORAL at 19:16

## 2020-05-13 RX ADMIN — PANTOPRAZOLE SODIUM 40 MG: 40 TABLET, DELAYED RELEASE ORAL at 06:00

## 2020-05-13 RX ADMIN — SORBITOL SOLUTION (BULK) 30 ML: 70 SOLUTION at 14:59

## 2020-05-13 RX ADMIN — LORAZEPAM 0.5 MG: 0.5 TABLET ORAL at 22:00

## 2020-05-13 RX ADMIN — SUCRALFATE 1 G: 1 TABLET ORAL at 22:00

## 2020-05-13 RX ADMIN — PROMETHAZINE HYDROCHLORIDE 12.5 MG: 25 TABLET ORAL at 05:41

## 2020-05-13 RX ADMIN — SUCRALFATE 1 G: 1 TABLET ORAL at 08:15

## 2020-05-13 RX ADMIN — SODIUM CHLORIDE, PRESERVATIVE FREE 10 ML: 5 INJECTION INTRAVENOUS at 22:01

## 2020-05-13 RX ADMIN — ONDANSETRON 4 MG: 2 INJECTION INTRAMUSCULAR; INTRAVENOUS at 12:15

## 2020-05-13 RX ADMIN — CETIRIZINE HYDROCHLORIDE 5 MG: 1 SOLUTION ORAL at 08:15

## 2020-05-13 RX ADMIN — LIDOCAINE HYDROCHLORIDE: 20 SOLUTION ORAL; TOPICAL at 17:42

## 2020-05-13 RX ADMIN — METOCLOPRAMIDE 5 MG: 5 TABLET ORAL at 22:00

## 2020-05-13 RX ADMIN — OXYCODONE HYDROCHLORIDE AND ACETAMINOPHEN 1 TABLET: 5; 325 TABLET ORAL at 05:41

## 2020-05-13 RX ADMIN — DOCUSATE SODIUM 200 MG: 100 CAPSULE, LIQUID FILLED ORAL at 14:59

## 2020-05-13 RX ADMIN — SODIUM CHLORIDE, PRESERVATIVE FREE 10 ML: 5 INJECTION INTRAVENOUS at 08:15

## 2020-05-13 RX ADMIN — DOCUSATE SODIUM 200 MG: 100 CAPSULE, LIQUID FILLED ORAL at 22:00

## 2020-05-13 RX ADMIN — LIDOCAINE HYDROCHLORIDE: 20 SOLUTION ORAL; TOPICAL at 08:16

## 2020-05-13 ASSESSMENT — PAIN DESCRIPTION - LOCATION: LOCATION: ABDOMEN

## 2020-05-13 ASSESSMENT — PAIN DESCRIPTION - PROGRESSION: CLINICAL_PROGRESSION: NOT CHANGED

## 2020-05-13 ASSESSMENT — PAIN - FUNCTIONAL ASSESSMENT: PAIN_FUNCTIONAL_ASSESSMENT: PREVENTS OR INTERFERES SOME ACTIVE ACTIVITIES AND ADLS

## 2020-05-13 ASSESSMENT — PAIN SCALES - GENERAL
PAINLEVEL_OUTOF10: 0
PAINLEVEL_OUTOF10: 5
PAINLEVEL_OUTOF10: 0
PAINLEVEL_OUTOF10: 3

## 2020-05-13 ASSESSMENT — PAIN DESCRIPTION - PAIN TYPE: TYPE: SURGICAL PAIN;ACUTE PAIN

## 2020-05-13 ASSESSMENT — PAIN DESCRIPTION - DESCRIPTORS: DESCRIPTORS: ACHING;DISCOMFORT

## 2020-05-13 ASSESSMENT — PAIN DESCRIPTION - ONSET: ONSET: ON-GOING

## 2020-05-13 ASSESSMENT — PAIN DESCRIPTION - ORIENTATION: ORIENTATION: RIGHT;LEFT

## 2020-05-13 ASSESSMENT — PAIN DESCRIPTION - FREQUENCY: FREQUENCY: CONTINUOUS

## 2020-05-13 NOTE — PROGRESS NOTES
CCK, duration of HIDA scan etc. etc. all answered at length. Temperature 98.0 respirations 14 pulse 85 blood pressure 127/80.  99% saturation on room air. Sodium 139 potassium 3.6 chloride 105 CO2 26 BUN 17 creatinine 0.8 magnesium 2.4 glucose 95 calcium 9.3 phosphorus 3.3 protein 6.2 albumin 3.9 pro calcitonin 0.05. CRP less than 0.1. Liver functions normal.  HIDA scan with following results--    Findings:    The patient was injected with 8.2 mCi of technetium mebrofenin. Clearance of radiotracer from the blood pool was normal    Excretion of radiotracer from the liver appears to be normal    Excretion of radiotracer into the biliary tree appears to be normal    Excretion of tracer into the small bowel appears to be normal.    The gallbladder was visualized          Impression:       1.  Unremarkable study       5/12/2020-patient laying quietly in bed; no chest pain no dyspnea. She is had no bowel movement since admission. Slept well overnight no nausea this morning. Temperature 98.5 respirations 16 pulse 88 blood pressure 119/71.  97% saturation on room air. Sodium 140 potassium 3.9 chloride 105 CO2 24 BUN 14 creatinine 0.8 magnesium 2.5 glucose 100 calcium 9.3 phosphorus 4.3 protein 6.3 albumin 3.9 liver functions normal.  Surgical resident note from this morning appreciated; mariano bartholomew this afternoon. Dietary note appreciated; Ensure clear will start after surgery. 5/13/2020-patient sitting quietly in bed; no chest pain no dyspnea. Postoperative yesterday she had severe nausea likely anesthetic related requiring IV medications. She has had 2 episodes of significant nausea today, both postprandial after breakfast and after lunch. She has had no bowel movement since 5/8/2020. As previously, patient lives alone, no help. She has been up walking without severe pain however most of the pain occurs as she is getting out of bed, all right upper quadrant.   Because of that she is reluctant to get out ambulation  Likely discharge in a.m. if stable        Discussed with patient and nursing.       Ke Amado DO     1:08 PM     5/13/2020    TIME > 35 MINUTES    >  50 %  OF  TIME  DISCUSSION     Active Hospital Problems    Diagnosis    B12 deficiency [E53.8]    Intractable vomiting with nausea [R11.2]    History of hiatal hernia [Z87.19]    Intractable nausea and vomiting [R11.2]    Renal mass, right [N28.89]    Asymptomatic gallstones [K80.20]    Hemangioma of liver [D18.03]    Kidney stone [N20.0]    Anxiety [F41.9]                 ------------  INFORMATION  -----------      DIET:Dietary Nutrition Supplements: Clear Liquid Oral Supplement  DIET LOW FAT;      No Known Allergies      MEDICATION SIDE EFFECTS:none       SCHEDULED MEDS:                                 Current Facility-Administered Medications   Medication Dose Route Frequency Provider Last Rate Last Dose    oxyCODONE-acetaminophen (PERCOCET) 5-325 MG per tablet 1 tablet  1 tablet Oral Q4H PRN Donna Greene MD   1 tablet at 05/13/20 0541    Or    oxyCODONE-acetaminophen (PERCOCET) 5-325 MG per tablet 2 tablet  2 tablet Oral Q4H PRN Donna Greene MD        scopolamine (TRANSDERM-SCOP) transdermal patch 1 patch  1 patch Transdermal Q72H Stephen Swanson MD   1 patch at 05/12/20 1819    sodium chloride flush 0.9 % injection 10 mL  10 mL Intravenous 2 times per day Donna Greene MD   10 mL at 05/13/20 0815    sodium chloride flush 0.9 % injection 10 mL  10 mL Intravenous PRN Donna Greene MD        acetaminophen (TYLENOL) tablet 650 mg  650 mg Oral Q6H PRN Donna Greene MD        Or    acetaminophen (TYLENOL) suppository 650 mg  650 mg Rectal Q6H PRN Donna Greene MD        magnesium hydroxide (MILK OF MAGNESIA) 400 MG/5ML suspension 30 mL  30 mL Oral Daily PRN Donna Greene MD        promethazine (PHENERGAN) tablet 12.5 mg  12.5 mg Oral Q6H PRN Donna Greene MD   12.5 mg at 05/13/20 0541    Or    °F (36.8 °C) Oral 90 18 97 %         Intake/Output Summary (Last 24 hours) at 5/13/2020 1308  Last data filed at 5/13/2020 0724  Gross per 24 hour   Intake 1380 ml   Output 800 ml   Net 580 ml       I/O last 3 completed shifts: In: 0 [P.O.:120;  I.V.:1260]  Out: 300 [Urine:300]    I/O this shift:  In: -   Out: 500 [Urine:500]    Wt Readings from Last 3 Encounters:   05/13/20 143 lb 9.6 oz (65.1 kg)   12/10/19 160 lb (72.6 kg)   07/12/19 170 lb (77.1 kg)       Labs:   CBC:   Lab Results   Component Value Date    WBC 11.4 05/13/2020    RBC 4.55 05/13/2020    HGB 14.3 05/13/2020    HCT 42.4 05/13/2020    MCV 93.2 05/13/2020    MCH 31.4 05/13/2020    MCHC 33.7 05/13/2020    RDW 11.9 05/13/2020     05/13/2020    MPV 9.2 05/13/2020     CBC with Differential:    Lab Results   Component Value Date    WBC 11.4 05/13/2020    RBC 4.55 05/13/2020    HGB 14.3 05/13/2020    HCT 42.4 05/13/2020     05/13/2020    MCV 93.2 05/13/2020    MCH 31.4 05/13/2020    MCHC 33.7 05/13/2020    RDW 11.9 05/13/2020    LYMPHOPCT 12.9 05/13/2020    MONOPCT 5.2 05/13/2020    BASOPCT 0.1 05/13/2020    MONOSABS 0.59 05/13/2020    LYMPHSABS 1.47 05/13/2020    EOSABS 0.00 05/13/2020    BASOSABS 0.01 05/13/2020     Hemoglobin/Hematocrit:    Lab Results   Component Value Date    HGB 14.3 05/13/2020    HCT 42.4 05/13/2020     CMP:    Lab Results   Component Value Date     05/13/2020    K 4.3 05/13/2020    K 3.7 12/10/2019     05/13/2020    CO2 25 05/13/2020    BUN 15 05/13/2020    CREATININE 0.7 05/13/2020    GFRAA >60 05/13/2020    LABGLOM >60 05/13/2020    GLUCOSE 144 05/13/2020    PROT 6.2 05/13/2020    LABALBU 3.7 05/13/2020    CALCIUM 9.2 05/13/2020    BILITOT 0.8 05/13/2020    ALKPHOS 56 05/13/2020    AST 40 05/13/2020    ALT 39 05/13/2020     BMP:    Lab Results   Component Value Date     05/13/2020    K 4.3 05/13/2020    K 3.7 12/10/2019     05/13/2020    CO2 25 05/13/2020    BUN 15 05/13/2020    LABALBU 3.7 05/13/2020    CREATININE 0.7 05/13/2020    CALCIUM 9.2 05/13/2020    GFRAA >60 05/13/2020    LABGLOM >60 05/13/2020    GLUCOSE 144 05/13/2020     Hepatic Function Panel:    Lab Results   Component Value Date    ALKPHOS 56 05/13/2020    ALT 39 05/13/2020    AST 40 05/13/2020    PROT 6.2 05/13/2020    BILITOT 0.8 05/13/2020    BILIDIR <0.2 05/13/2020    IBILI see below 05/13/2020    LABALBU 3.7 05/13/2020     Magnesium:    Lab Results   Component Value Date    MG 2.0 05/13/2020     Phosphorus:    Lab Results   Component Value Date    PHOS 3.6 05/13/2020     LDH:  No results found for: LDH  Uric Acid:  No results found for: Rantede Hangta  PT/INR:  No results found for: PROTIME, INR  Warfarin PT/INR:  No components found for: PTPATWAR, PTINRWAR  PTT:  No results found for: APTT, PTT[APTT}  Troponin:    Lab Results   Component Value Date    TROPONINI <0.01 05/08/2020     Last 3 Troponin:    Lab Results   Component Value Date    TROPONINI <0.01 05/08/2020    TROPONINI <0.01 12/10/2019    TROPONINI <0.01 07/12/2019     U/A:    Lab Results   Component Value Date    COLORU Yellow 05/09/2020    PROTEINU Negative 05/09/2020    PHUR 6.5 05/09/2020    WBCUA 2-5 05/09/2020    RBCUA 2-5 05/09/2020    BACTERIA RARE 05/09/2020    CLARITYU Clear 05/09/2020    SPECGRAV <=1.005 05/09/2020    LEUKOCYTESUR SMALL 05/09/2020    UROBILINOGEN 0.2 05/09/2020    BILIRUBINUR Negative 05/09/2020    BLOODU SMALL 05/09/2020    GLUCOSEU Negative 05/09/2020     HgBA1c:    Lab Results   Component Value Date    LABA1C 4.7 05/10/2020     FLP:  No results found for: TRIG, HDL, LDLCALC, LDLDIRECT, LABVLDL  TSH:  No results found for: TSH  VITAMIN B12: No components found for: B12  FOLATE:    Lab Results   Component Value Date    FOLATE 16.7 05/10/2020     AMYLASE:    Lab Results   Component Value Date    AMYLASE 27 05/13/2020     LIPASE:    Lab Results   Component Value Date    LIPASE 13 05/13/2020        CBC:  Recent Labs     05/13/20  0969

## 2020-05-13 NOTE — PROGRESS NOTES
GENERAL SURGERY  DAILY PROGRESS NOTE  5/13/2020    Subjective: Tolerated diet post op, pain tolerable, no nausea or vomiting    Objective:  BP (!) 149/66   Pulse 89   Temp 96.8 °F (36 °C) (Oral)   Resp 16   Ht 5' 2\" (1.575 m)   Wt 143 lb 9.6 oz (65.1 kg)   SpO2 100%   BMI 26.26 kg/m²     GENERAL:  Laying in bed, awake, alert, cooperative, no apparent distress  HEAD: Normocephalic, atraumatic  EYES: No sclera icterus, pupils equal  LUNGS:  No increased work of breathing  CARDIOVASCULAR:  RR  ABDOMEN:  Soft, non-tender, non-distended, incisions C/D/I  EXTREMITIES: No edema or swelling  SKIN: Warm and dry    Assessment/Plan:  63 y.o. female POD 1 robot puma    Low fat diet  Ambulate  If tolerates diet will be ok for DC later today     Electronically signed by Madhav Shields DO on 5/13/2020 at 7:06 AM     The patient was seen and examined and the chart was reviewed. The patient does have some nausea after physical activity. The abdomen is soft nondistended with appropriate incisional tenderness. The plan will be to advance the diet as tolerated. Likely, the patient will be discharged tomorrow.

## 2020-05-13 NOTE — PATIENT CARE CONFERENCE
[facility] Quality Flow/Interdisciplinary Rounds Progress Note        Quality Flow Rounds held on May 13, 2020    Disciplines Attending:   and     Merced Sharp was admitted on 5/8/2020  9:38 PM    Anticipated Discharge Date:  Expected Discharge Date: 05/13/20    Disposition:    Thor Score:  Thor Scale Score: 22    Readmission Risk              Risk of Unplanned Readmission:        0           Discussed patient goal for the day, patient clinical progression, and barriers to discharge.   The following Goal(s) of the Day/Commitment(s) have been identified:  Discharge - Obtain Order      Ladan Zacarias  May 13, 2020

## 2020-05-14 LAB
ALBUMIN SERPL-MCNC: 3.3 G/DL (ref 3.5–5.2)
ALP BLD-CCNC: 55 U/L (ref 35–104)
ALT SERPL-CCNC: 41 U/L (ref 0–32)
AMYLASE: 39 U/L (ref 20–100)
ANION GAP SERPL CALCULATED.3IONS-SCNC: 10 MMOL/L (ref 7–16)
AST SERPL-CCNC: 38 U/L (ref 0–31)
BASOPHILS ABSOLUTE: 0.02 E9/L (ref 0–0.2)
BASOPHILS RELATIVE PERCENT: 0.2 % (ref 0–2)
BILIRUB SERPL-MCNC: 0.7 MG/DL (ref 0–1.2)
BILIRUBIN DIRECT: <0.2 MG/DL (ref 0–0.3)
BILIRUBIN, INDIRECT: ABNORMAL MG/DL (ref 0–1)
BUN BLDV-MCNC: 18 MG/DL (ref 8–23)
CALCIUM SERPL-MCNC: 8.9 MG/DL (ref 8.6–10.2)
CHLORIDE BLD-SCNC: 103 MMOL/L (ref 98–107)
CO2: 25 MMOL/L (ref 22–29)
CREAT SERPL-MCNC: 0.8 MG/DL (ref 0.5–1)
EOSINOPHILS ABSOLUTE: 0.05 E9/L (ref 0.05–0.5)
EOSINOPHILS RELATIVE PERCENT: 0.6 % (ref 0–6)
GFR AFRICAN AMERICAN: >60
GFR NON-AFRICAN AMERICAN: >60 ML/MIN/1.73
GLUCOSE BLD-MCNC: 136 MG/DL (ref 74–99)
HCT VFR BLD CALC: 40.9 % (ref 34–48)
HEMOGLOBIN: 13.5 G/DL (ref 11.5–15.5)
IMMATURE GRANULOCYTES #: 0.03 E9/L
IMMATURE GRANULOCYTES %: 0.4 % (ref 0–5)
LIPASE: 28 U/L (ref 13–60)
LYMPHOCYTES ABSOLUTE: 2.23 E9/L (ref 1.5–4)
LYMPHOCYTES RELATIVE PERCENT: 27.3 % (ref 20–42)
MAGNESIUM: 2.2 MG/DL (ref 1.6–2.6)
MCH RBC QN AUTO: 31.3 PG (ref 26–35)
MCHC RBC AUTO-ENTMCNC: 33 % (ref 32–34.5)
MCV RBC AUTO: 94.7 FL (ref 80–99.9)
MONOCYTES ABSOLUTE: 0.68 E9/L (ref 0.1–0.95)
MONOCYTES RELATIVE PERCENT: 8.3 % (ref 2–12)
NEUTROPHILS ABSOLUTE: 5.15 E9/L (ref 1.8–7.3)
NEUTROPHILS RELATIVE PERCENT: 63.2 % (ref 43–80)
PDW BLD-RTO: 12.1 FL (ref 11.5–15)
PHOSPHORUS: 2.4 MG/DL (ref 2.5–4.5)
PLATELET # BLD: 201 E9/L (ref 130–450)
PMV BLD AUTO: 9.5 FL (ref 7–12)
POTASSIUM SERPL-SCNC: 3.7 MMOL/L (ref 3.5–5)
RBC # BLD: 4.32 E12/L (ref 3.5–5.5)
SODIUM BLD-SCNC: 138 MMOL/L (ref 132–146)
TOTAL PROTEIN: 6 G/DL (ref 6.4–8.3)
WBC # BLD: 8.2 E9/L (ref 4.5–11.5)

## 2020-05-14 PROCEDURE — 1200000000 HC SEMI PRIVATE

## 2020-05-14 PROCEDURE — 85025 COMPLETE CBC W/AUTO DIFF WBC: CPT

## 2020-05-14 PROCEDURE — 80048 BASIC METABOLIC PNL TOTAL CA: CPT

## 2020-05-14 PROCEDURE — 87088 URINE BACTERIA CULTURE: CPT

## 2020-05-14 PROCEDURE — 80076 HEPATIC FUNCTION PANEL: CPT

## 2020-05-14 PROCEDURE — 36415 COLL VENOUS BLD VENIPUNCTURE: CPT

## 2020-05-14 PROCEDURE — 6360000002 HC RX W HCPCS: Performed by: STUDENT IN AN ORGANIZED HEALTH CARE EDUCATION/TRAINING PROGRAM

## 2020-05-14 PROCEDURE — 6370000000 HC RX 637 (ALT 250 FOR IP): Performed by: STUDENT IN AN ORGANIZED HEALTH CARE EDUCATION/TRAINING PROGRAM

## 2020-05-14 PROCEDURE — 82150 ASSAY OF AMYLASE: CPT

## 2020-05-14 PROCEDURE — 84100 ASSAY OF PHOSPHORUS: CPT

## 2020-05-14 PROCEDURE — 2580000003 HC RX 258: Performed by: STUDENT IN AN ORGANIZED HEALTH CARE EDUCATION/TRAINING PROGRAM

## 2020-05-14 PROCEDURE — 2500000003 HC RX 250 WO HCPCS: Performed by: INTERNAL MEDICINE

## 2020-05-14 PROCEDURE — 2580000003 HC RX 258: Performed by: INTERNAL MEDICINE

## 2020-05-14 PROCEDURE — 6370000000 HC RX 637 (ALT 250 FOR IP): Performed by: INTERNAL MEDICINE

## 2020-05-14 PROCEDURE — G0378 HOSPITAL OBSERVATION PER HR: HCPCS

## 2020-05-14 PROCEDURE — 83735 ASSAY OF MAGNESIUM: CPT

## 2020-05-14 PROCEDURE — 83690 ASSAY OF LIPASE: CPT

## 2020-05-14 RX ORDER — SORBITOL SOLUTION 70 %
60 SOLUTION, ORAL MISCELLANEOUS ONCE
Status: COMPLETED | OUTPATIENT
Start: 2020-05-14 | End: 2020-05-14

## 2020-05-14 RX ADMIN — SODIUM CHLORIDE, PRESERVATIVE FREE 10 ML: 5 INJECTION INTRAVENOUS at 21:05

## 2020-05-14 RX ADMIN — LIDOCAINE HYDROCHLORIDE: 20 SOLUTION ORAL; TOPICAL at 23:05

## 2020-05-14 RX ADMIN — OXYCODONE HYDROCHLORIDE AND ACETAMINOPHEN 1 TABLET: 5; 325 TABLET ORAL at 00:09

## 2020-05-14 RX ADMIN — LIDOCAINE HYDROCHLORIDE: 20 SOLUTION ORAL; TOPICAL at 17:53

## 2020-05-14 RX ADMIN — ONDANSETRON 4 MG: 2 INJECTION INTRAMUSCULAR; INTRAVENOUS at 23:05

## 2020-05-14 RX ADMIN — POTASSIUM PHOSPHATE, MONOBASIC AND POTASSIUM PHOSPHATE, DIBASIC 20 MMOL: 224; 236 INJECTION, SOLUTION, CONCENTRATE INTRAVENOUS at 17:42

## 2020-05-14 RX ADMIN — PANTOPRAZOLE SODIUM 40 MG: 40 TABLET, DELAYED RELEASE ORAL at 16:25

## 2020-05-14 RX ADMIN — DOCUSATE SODIUM 200 MG: 100 CAPSULE, LIQUID FILLED ORAL at 21:04

## 2020-05-14 RX ADMIN — METOCLOPRAMIDE 5 MG: 5 TABLET ORAL at 21:04

## 2020-05-14 RX ADMIN — CETIRIZINE HYDROCHLORIDE 5 MG: 1 SOLUTION ORAL at 08:47

## 2020-05-14 RX ADMIN — SODIUM CHLORIDE, PRESERVATIVE FREE 10 ML: 5 INJECTION INTRAVENOUS at 08:50

## 2020-05-14 RX ADMIN — LORAZEPAM 0.5 MG: 0.5 TABLET ORAL at 21:04

## 2020-05-14 RX ADMIN — SUCRALFATE 1 G: 1 TABLET ORAL at 21:04

## 2020-05-14 RX ADMIN — PANTOPRAZOLE SODIUM 40 MG: 40 TABLET, DELAYED RELEASE ORAL at 06:07

## 2020-05-14 RX ADMIN — SORBITOL SOLUTION (BULK) 60 ML: 70 SOLUTION at 17:42

## 2020-05-14 RX ADMIN — SUCRALFATE 1 G: 1 TABLET ORAL at 08:50

## 2020-05-14 RX ADMIN — OXYCODONE HYDROCHLORIDE AND ACETAMINOPHEN 1 TABLET: 5; 325 TABLET ORAL at 23:11

## 2020-05-14 RX ADMIN — ACETAMINOPHEN 650 MG: 325 TABLET ORAL at 17:53

## 2020-05-14 RX ADMIN — LORAZEPAM 0.5 MG: 0.5 TABLET ORAL at 08:50

## 2020-05-14 RX ADMIN — DOCUSATE SODIUM 200 MG: 100 CAPSULE, LIQUID FILLED ORAL at 08:49

## 2020-05-14 ASSESSMENT — PAIN SCALES - GENERAL
PAINLEVEL_OUTOF10: 0
PAINLEVEL_OUTOF10: 6
PAINLEVEL_OUTOF10: 0
PAINLEVEL_OUTOF10: 5
PAINLEVEL_OUTOF10: 0
PAINLEVEL_OUTOF10: 7
PAINLEVEL_OUTOF10: 0

## 2020-05-14 NOTE — PROGRESS NOTES
PROGRESS  NOTE --                                                          INTERNAL  MEDICINE                                                                              I  PERSONALLY SAW , EXAMINED, AND CARED FOR  [facility], 5/14/2020     LABS, XRAY ,CHART, AND MEDICATIONS  REVIEWED BY ME .      PATIENT PROBLEM LIST:  Principal Problem:    Intractable vomiting with nausea  Active Problems:    History of hiatal hernia    Intractable nausea and vomiting    Renal mass, right    Asymptomatic gallstones    Hemangioma of liver    Kidney stone    Anxiety    B12 deficiency  Resolved Problems:    * No resolved hospital problems. *         5/10/2020-sUBJECTIVE: Kim Moura is alert awake and cooperative; oriented ×3. Denies any chest pain, dyspnea. She still having postprandial nausea. She still requires Tigan 3 times a day straight. She has had no emesis. She slept fairly well overnight. Temperature 98.2 respirations 16 pulse 81 blood pressure 127/69.  98% saturation on room air. Sodium 139 potassium 3.6 chloride 105 CO2 24 BUN 13 creatinine 0.8 magnesium 2.4 glucose 93 lactic acid from yesterday 1.6. Calcium 9.2 phosphorus 3.5 protein 6.0 albumin 3.8 liver functions normal.  Procalcitonin 0.05. Troponins negative x3. A1c 4.7. Hemoglobin 13.6 WBC 5.4 platelets 600. B12 low 169 folate 16.7 normal.  Sed rate 5. Strep by PCR negative. Surgical note from today appreciated. Patient still having postprandial nausea which is relatively new for her. Surgery suggested HIDA scan and then trial of antibiotics. If negative robotic assisted laparoscopic cholecystectomy. 5/11/2020-patient sitting quietly in bed; no chest pain no dyspnea. Still having intermittent nausea with eating. No further emesis no abdominal pains. No diarrhea.  After rounds yesterday, patient and floor phoned me with numerous questions regarding HIDA scan, metoclopramide (REGLAN) tablet 5 mg  5 mg Oral Nightly Sandor Whitney MD   5 mg at 20 2200    sucralfate (CARAFATE) tablet 1 g  1 g Oral 2 times per day Sandor Whitney MD   1 g at 20 0850    sodium chloride (OCEAN, BABY AYR) 0.65 % nasal spray 1 spray  1 spray Each Nostril PRN Sandor Whitney MD   1 spray at 20 1643    cetirizine (ZYRTEC) syrup 5 mg  5 mg Oral Daily Sandor Whitney MD   5 mg at 20 0847    pantoprazole (PROTONIX) tablet 40 mg  40 mg Oral BID AC Sandor Whitney MD   40 mg at 20 1546    LORazepam (ATIVAN) tablet 0.5 mg  0.5 mg Oral BID Sandor Whitney MD   0.5 mg at 20     Scheduled Meds:   docusate sodium  200 mg Oral BID    scopolamine  1 patch Transdermal Q72H    sodium chloride flush  10 mL Intravenous 2 times per day    enoxaparin  40 mg Subcutaneous Daily    GI cocktail   Oral 4x Daily    metoclopramide  5 mg Oral Nightly    sucralfate  1 g Oral 2 times per day    cetirizine  5 mg Oral Daily    pantoprazole  40 mg Oral BID AC    LORazepam  0.5 mg Oral BID       Continuous Infusions:      Data:   Temperature:  Current - Temp: 100.2 °F (37.9 °C); Max - Temp  Av.8 °F (37.1 °C)  Min: 97.9 °F (36.6 °C)  Max: 100.2 °F (37.9 °C)    Respiratory Rate : Resp  Av  Min: 18  Max: 18    Pulse Range: Pulse  Av  Min: 86  Max: 112    Blood Presuure Range:  Systolic (92MKW), ODJ:059 , Min:109 , LJV:303   ; Diastolic (34WXR), ESF:95, Min:55, Max:70      Pulse ox Range: SpO2  Av.8 %  Min: 96 %  Max: 98 %    Patient Vitals for the past 8 hrs:   BP Temp Temp src Pulse Resp SpO2   20 1141 133/70 100.2 °F (37.9 °C) Oral 100 18 --   20 0845 119/62 98.3 °F (36.8 °C) Oral 92 18 97 %         Intake/Output Summary (Last 24 hours) at 2020 1210  Last data filed at 2020 0900  Gross per 24 hour   Intake 370 ml   Output --   Net 370 ml       I/O last 3 completed shifts: In: 250 [P.O.:240;  I.V.:10]  Out: 500 obstruction.                             [address]   12:10 PM     5/14/2020      Voice recognition software used for dictation

## 2020-05-15 VITALS
HEIGHT: 62 IN | RESPIRATION RATE: 16 BRPM | BODY MASS INDEX: 28.89 KG/M2 | OXYGEN SATURATION: 98 % | TEMPERATURE: 99.3 F | DIASTOLIC BLOOD PRESSURE: 60 MMHG | WEIGHT: 157 LBS | HEART RATE: 91 BPM | SYSTOLIC BLOOD PRESSURE: 122 MMHG

## 2020-05-15 PROBLEM — R11.15 EMESIS, PERSISTENT: Status: ACTIVE | Noted: 2020-05-15

## 2020-05-15 LAB
ALBUMIN SERPL-MCNC: 3.4 G/DL (ref 3.5–5.2)
ALP BLD-CCNC: 77 U/L (ref 35–104)
ALT SERPL-CCNC: 78 U/L (ref 0–32)
ANION GAP SERPL CALCULATED.3IONS-SCNC: 11 MMOL/L (ref 7–16)
AST SERPL-CCNC: 65 U/L (ref 0–31)
BASOPHILS ABSOLUTE: 0.03 E9/L (ref 0–0.2)
BASOPHILS RELATIVE PERCENT: 0.4 % (ref 0–2)
BILIRUB SERPL-MCNC: 0.7 MG/DL (ref 0–1.2)
BILIRUBIN DIRECT: <0.2 MG/DL (ref 0–0.3)
BILIRUBIN, INDIRECT: ABNORMAL MG/DL (ref 0–1)
BUN BLDV-MCNC: 11 MG/DL (ref 8–23)
CALCIUM SERPL-MCNC: 9.4 MG/DL (ref 8.6–10.2)
CHLORIDE BLD-SCNC: 103 MMOL/L (ref 98–107)
CO2: 24 MMOL/L (ref 22–29)
CREAT SERPL-MCNC: 0.6 MG/DL (ref 0.5–1)
EOSINOPHILS ABSOLUTE: 0.1 E9/L (ref 0.05–0.5)
EOSINOPHILS RELATIVE PERCENT: 1.3 % (ref 0–6)
GFR AFRICAN AMERICAN: >60
GFR NON-AFRICAN AMERICAN: >60 ML/MIN/1.73
GLUCOSE BLD-MCNC: 83 MG/DL (ref 74–99)
HCT VFR BLD CALC: 43.6 % (ref 34–48)
HEMOGLOBIN: 14.2 G/DL (ref 11.5–15.5)
IMMATURE GRANULOCYTES #: 0.02 E9/L
IMMATURE GRANULOCYTES %: 0.3 % (ref 0–5)
LYMPHOCYTES ABSOLUTE: 1.35 E9/L (ref 1.5–4)
LYMPHOCYTES RELATIVE PERCENT: 17.9 % (ref 20–42)
MAGNESIUM: 2.2 MG/DL (ref 1.6–2.6)
MCH RBC QN AUTO: 31.1 PG (ref 26–35)
MCHC RBC AUTO-ENTMCNC: 32.6 % (ref 32–34.5)
MCV RBC AUTO: 95.6 FL (ref 80–99.9)
MONOCYTES ABSOLUTE: 0.68 E9/L (ref 0.1–0.95)
MONOCYTES RELATIVE PERCENT: 9 % (ref 2–12)
NEUTROPHILS ABSOLUTE: 5.36 E9/L (ref 1.8–7.3)
NEUTROPHILS RELATIVE PERCENT: 71.1 % (ref 43–80)
PDW BLD-RTO: 12.1 FL (ref 11.5–15)
PHOSPHORUS: 3.4 MG/DL (ref 2.5–4.5)
PLATELET # BLD: 200 E9/L (ref 130–450)
PMV BLD AUTO: 9.5 FL (ref 7–12)
POTASSIUM SERPL-SCNC: 3.9 MMOL/L (ref 3.5–5)
RBC # BLD: 4.56 E12/L (ref 3.5–5.5)
SODIUM BLD-SCNC: 138 MMOL/L (ref 132–146)
TOTAL PROTEIN: 6.5 G/DL (ref 6.4–8.3)
WBC # BLD: 7.5 E9/L (ref 4.5–11.5)

## 2020-05-15 PROCEDURE — 2580000003 HC RX 258: Performed by: STUDENT IN AN ORGANIZED HEALTH CARE EDUCATION/TRAINING PROGRAM

## 2020-05-15 PROCEDURE — 6360000002 HC RX W HCPCS: Performed by: STUDENT IN AN ORGANIZED HEALTH CARE EDUCATION/TRAINING PROGRAM

## 2020-05-15 PROCEDURE — 6370000000 HC RX 637 (ALT 250 FOR IP): Performed by: STUDENT IN AN ORGANIZED HEALTH CARE EDUCATION/TRAINING PROGRAM

## 2020-05-15 PROCEDURE — 80048 BASIC METABOLIC PNL TOTAL CA: CPT

## 2020-05-15 PROCEDURE — 84100 ASSAY OF PHOSPHORUS: CPT

## 2020-05-15 PROCEDURE — G0378 HOSPITAL OBSERVATION PER HR: HCPCS

## 2020-05-15 PROCEDURE — 83735 ASSAY OF MAGNESIUM: CPT

## 2020-05-15 PROCEDURE — 36415 COLL VENOUS BLD VENIPUNCTURE: CPT

## 2020-05-15 PROCEDURE — 80076 HEPATIC FUNCTION PANEL: CPT

## 2020-05-15 PROCEDURE — 85025 COMPLETE CBC W/AUTO DIFF WBC: CPT

## 2020-05-15 RX ORDER — SUCRALFATE 1 G/1
1 TABLET ORAL EVERY 12 HOURS SCHEDULED
Qty: 120 TABLET | Refills: 3 | Status: SHIPPED | OUTPATIENT
Start: 2020-05-15

## 2020-05-15 RX ORDER — VITAMIN B COMPLEX
1 TABLET ORAL DAILY
Qty: 100 TABLET | Refills: 0 | Status: SHIPPED | OUTPATIENT
Start: 2020-05-15 | End: 2020-07-23

## 2020-05-15 RX ORDER — IBUPROFEN 400 MG/1
400 TABLET ORAL EVERY 6 HOURS PRN
Qty: 120 TABLET | Refills: 3 | COMMUNITY
Start: 2020-05-15 | End: 2020-11-03

## 2020-05-15 RX ORDER — PANTOPRAZOLE SODIUM 40 MG/1
40 TABLET, DELAYED RELEASE ORAL
Qty: 60 TABLET | Refills: 3 | Status: ON HOLD | OUTPATIENT
Start: 2020-05-15 | End: 2020-11-10 | Stop reason: HOSPADM

## 2020-05-15 RX ORDER — METOCLOPRAMIDE 5 MG/1
5 TABLET ORAL NIGHTLY
Qty: 30 TABLET | Refills: 1 | Status: SHIPPED | OUTPATIENT
Start: 2020-05-15 | End: 2020-07-23

## 2020-05-15 RX ORDER — ONDANSETRON 4 MG/1
4 TABLET, ORALLY DISINTEGRATING ORAL 3 TIMES DAILY PRN
Qty: 21 TABLET | Refills: 0 | Status: ON HOLD | OUTPATIENT
Start: 2020-05-15 | End: 2020-06-22 | Stop reason: HOSPADM

## 2020-05-15 RX ADMIN — CETIRIZINE HYDROCHLORIDE 5 MG: 1 SOLUTION ORAL at 08:48

## 2020-05-15 RX ADMIN — SODIUM CHLORIDE, PRESERVATIVE FREE 10 ML: 5 INJECTION INTRAVENOUS at 08:48

## 2020-05-15 RX ADMIN — LORAZEPAM 0.5 MG: 0.5 TABLET ORAL at 08:48

## 2020-05-15 RX ADMIN — PANTOPRAZOLE SODIUM 40 MG: 40 TABLET, DELAYED RELEASE ORAL at 16:49

## 2020-05-15 RX ADMIN — PANTOPRAZOLE SODIUM 40 MG: 40 TABLET, DELAYED RELEASE ORAL at 06:31

## 2020-05-15 RX ADMIN — OXYCODONE HYDROCHLORIDE AND ACETAMINOPHEN 1 TABLET: 5; 325 TABLET ORAL at 08:48

## 2020-05-15 RX ADMIN — OXYCODONE HYDROCHLORIDE AND ACETAMINOPHEN 1 TABLET: 5; 325 TABLET ORAL at 13:41

## 2020-05-15 RX ADMIN — SUCRALFATE 1 G: 1 TABLET ORAL at 08:48

## 2020-05-15 RX ADMIN — ONDANSETRON 4 MG: 2 INJECTION INTRAMUSCULAR; INTRAVENOUS at 16:46

## 2020-05-15 RX ADMIN — SODIUM CHLORIDE, PRESERVATIVE FREE 10 ML: 5 INJECTION INTRAVENOUS at 16:46

## 2020-05-15 ASSESSMENT — PAIN DESCRIPTION - ORIENTATION
ORIENTATION: RIGHT;LEFT
ORIENTATION: RIGHT;LEFT

## 2020-05-15 ASSESSMENT — PAIN DESCRIPTION - ONSET
ONSET: ON-GOING
ONSET: ON-GOING

## 2020-05-15 ASSESSMENT — PAIN SCALES - GENERAL
PAINLEVEL_OUTOF10: 0
PAINLEVEL_OUTOF10: 6
PAINLEVEL_OUTOF10: 0
PAINLEVEL_OUTOF10: 6

## 2020-05-15 ASSESSMENT — PAIN DESCRIPTION - FREQUENCY
FREQUENCY: INTERMITTENT
FREQUENCY: INTERMITTENT

## 2020-05-15 ASSESSMENT — PAIN DESCRIPTION - LOCATION
LOCATION: ABDOMEN
LOCATION: ABDOMEN

## 2020-05-15 ASSESSMENT — PAIN DESCRIPTION - DESCRIPTORS
DESCRIPTORS: DISCOMFORT
DESCRIPTORS: DISCOMFORT

## 2020-05-15 ASSESSMENT — PAIN - FUNCTIONAL ASSESSMENT
PAIN_FUNCTIONAL_ASSESSMENT: ACTIVITIES ARE NOT PREVENTED
PAIN_FUNCTIONAL_ASSESSMENT: ACTIVITIES ARE NOT PREVENTED

## 2020-05-15 ASSESSMENT — PAIN DESCRIPTION - PROGRESSION
CLINICAL_PROGRESSION: NOT CHANGED
CLINICAL_PROGRESSION: NOT CHANGED

## 2020-05-15 ASSESSMENT — PAIN DESCRIPTION - PAIN TYPE
TYPE: SURGICAL PAIN
TYPE: SURGICAL PAIN

## 2020-05-15 NOTE — PROGRESS NOTES
tablet 40 mg, 40 mg, Oral, BID AC  LORazepam (ATIVAN) tablet 0.5 mg, 0.5 mg, Oral, BID    ASSESSMENT:    63 y.o. female status post robotic cholecystectomy post op day #  3    PLAN:    Discharge home  Rodriguez Moore 5/15/86262:08 PM

## 2020-05-15 NOTE — DISCHARGE INSTR - DIET
hamburger)  Sabrina Hove  Sausage  Cold cuts, such as salami or bologna  Corned beef  Hot dogs  Organ meats (liver, brains, sweetbreads)  Poultry with skin  Fried meat, poultry, and fish  Whole eggs and egg yolks   Fats and Oils Butter  Stick margarine  Shortening  Partially hydrogenated oils  Tropical oils (coconut, palm, and palm kernel oils)     Fat-Restricted Sample 1-Day Menu   Breakfast 1/2 cup apple juice   3/4 cup oatmeal   1 small banana   1 cup fat-free milk   1 cup brewed coffee   Lunch 2 slices whole wheat bread   2 ounces lean deli turkey breast   1 ounce low-fat Swiss cheese   Mustard   1 medium sliced tomato   Shredded lettuce   1 pear   1 cup fat-free milk   Evening Meal 3 ounces broiled fish   1 cup brown rice   1 teaspoon soft margarine (for rice)   1 medium stalk cooked broccoli   Olive oil and vinegar dressing (for salad)   1 small whole grain roll   1 teaspoon soft margarine (for roll)   1 cup tea   1/2 cup fat-free frozen yogurt with fruit   Evening Snack 1 ounce trail mix with nuts, seeds, dried fruit   1 cup blueberries   1 cup fat-free milk         Fat-Restricted Vegetarian (Lacto-Ovo) Sample 1-Day Menu   Breakfast 1 cup cooked oatmeal made with:   1 cup fat-free milk   1 banana   1 cup blueberries   Lunch 2 slices whole wheat bread   1 ounce low-fat ammy cheese   2 thick slices tomato   ½ cup strips green pepper   2 tablespoons hummus   1 apple   1 cup fat-free milk   Evening Meal Tofu pasta bowl made with: ½ cup tofu   1 cup cooked whole wheat pasta   ½ cup kidney beans   1 cup broccoli   ½ cup tomato sauce   Evening Snack 8 ounces low-fat yogurt   2 tablespoons ground flaxseeds         Fat-Restricted Vegan Sample 1-Day Menu   Breakfast 1 cup cooked oatmeal made with:   1 cup soymilk fortified with calcium, vitamin B12, and vitamin D   1 cup chopped apples   ¼ cup walnuts   ½ teaspoon ground cinnamon   Lunch 1 whole wheat caryl bread   3 tablespoons hummus   1 cup spinach   2 thick slices tomato

## 2020-05-15 NOTE — PROGRESS NOTES
of bed. Temperature 98.3 respirations 18 pulse 90 blood pressure 110/59.  97% saturation on room air. Sodium 138 potassium 4.3 chloride 104 CO2 25 BUN 15 creatinine 0.7 magnesium 2.0 glucose 144 calcium 9.2 phosphorus 3.6 protein 6.2 albumin 3.7 ALT/AST 39/40 respectively. Slightly elevated. Hemoglobin 14.3 WBC 11.4 platelets 705. She underwent robotic assisted laparoscopic cholecystectomy yesterday. Surgical note from today appreciated, low-fat diet ambulate possible discharge if tolerates diet. 5/14/2020-patient sitting quietly in bed no chest pain no dyspnea. Still having mild episodes of nausea; appetite fair ate half of her breakfast half of her lunch. She is still not had bowel movement. She did try concoction of prune juice with apple juice this morning still no results. She still leery of being discharged home; has no help. Still has significant right abdominal pain with trying to move from a laying to a standing position and vice versa. When she is up and walking, pain is quite tolerable. No dysuria . Temperature 100.2 respirations 18 pulse 100 blood pressure 133/70.  97% saturation on room air. Sodium 138 potassium 3.7 chloride 103 CO2 25 BUN 18 creatinine 0.8 magnesium 2.2 glucose 136 calcium 8.9 phosphorus 2.4 protein 6.0 albumin 3.3 ALT 41 AST 38 remainder of liver functions normal.  Amylase and lipase normal.  Hemoglobin 13.5 WBC 8.2 platelets 671. Surgical pathology remains pending. 5/15/2020-patient sitting quietly in bed; no chest pain no dyspnea. She did have significant bowel movements most of the evening, loose bowels have stopped at this time. Minimal nausea if any. Numerous questions again answered at length; regarding calcium intake, low-fat diet, activity etc.  Temperature 99.3 respirations 16 pulse 91 blood pressure 122/60.  98% saturation on room air.   Sodium 138 potassium 3.9 chloride 103 CO2 24 BUN 11 creatinine 0.6 magnesium 2.2 glucose 83 calcium 9.4 phosphorus 3.4 protein 6.5 albumin 3.4 ALT/AST 78/65 respectively. Hemoglobin 14.2 WBC 7.5 platelets 662,226. Surgical pathology still pending. ROS:  Negative to a 10 system review except that mentioned in the HPI. Objective:     PHYSICAL EXAM:    VS: /60   Pulse 91   Temp 99.3 °F (37.4 °C) (Oral)   Resp 16   Ht 5' 2\" (1.575 m)   Wt 157 lb (71.2 kg)   SpO2 98%   BMI 28.72 kg/m²   General appearance: Alert, Awake, Oriented times 3, no distress; anxious ; somewhat tearful anticipating surgery  Skin: Warm and dry ; no rashes  Head: Normocephalic. No masses, lesions or tenderness noted  Eyes: Conjunctivae pink, sclera white. PERRL,EOM-I  Ears: External ears normal  Nose/Sinuses: Nares normal. Septum midline. Mucosa normal. No drainage  Oropharynx: Oropharynx clear with no exudate seen  Neck: Supple. No jugular venous distension, lymphadenopathy or thyromegaly Trachea midline  Lungs: Clear to auscultation bilaterally. No rhonchi, crackles or wheezes  Heart: S1 S2  Regular rate and rhythm. No rub, murmur or gallop  Abdomen: Soft, tender to palpation right upper quadrant, postoperative. BS normal. No masses, organomegaly; no rebound or guarding; abdominal puncture wounds healing well  Extremities: No edema, Peripheral pulses palpable  Musculoskeletal: Muscular strength appears intact. Neuro:  No focal motor defects ; II-XII grossly intact . [place] equally    TELEMETRY: REVIEWED--Telemetry: Sinus    ASSESSMENT:   Principal Problem:    Intractable vomiting with nausea  Active Problems:    History of hiatal hernia    Intractable nausea and vomiting    Renal mass, right    Asymptomatic gallstones    Hemangioma of liver    Kidney stone    Anxiety    B12 deficiency  Resolved Problems:    * No resolved hospital problems.  *      PLAN:  SEE ORDERS      RE  CHANGES AND FINDINGS   Medications reviewed with patient  GI prophylaxis  DVT prophylaxis  Consultants notes reviewed    Cyanocobalamin 1000 greater than 101 Fahrenheit  Med reconciliation completed  Prescriptions written  Follow-up PCP 1 week  Follow-up surgery per their recommendation  Continue low-fat diet, copy to be given to patient  Percocet 5 # 12          Discussed with patient and nursing.       Tonya Amado DO     10:46 AM     5/15/2020    TIME > 35 MINUTES    >  50 %  OF  TIME  DISCUSSION     Active Hospital Problems    Diagnosis    B12 deficiency [E53.8]    Intractable vomiting with nausea [R11.2]    History of hiatal hernia [Z87.19]    Intractable nausea and vomiting [R11.2]    Renal mass, right [N28.89]    Asymptomatic gallstones [K80.20]    Hemangioma of liver [D18.03]    Kidney stone [N20.0]    Anxiety [F41.9]                 ------------  INFORMATION  -----------      DIET:Dietary Nutrition Supplements: Clear Liquid Oral Supplement  DIET LOW FAT;      No Known Allergies      MEDICATION SIDE EFFECTS:none       SCHEDULED MEDS:                                 Current Facility-Administered Medications   Medication Dose Route Frequency Provider Last Rate Last Dose    docusate sodium (COLACE) capsule 200 mg  200 mg Oral BID Tonya Amado DO   200 mg at 05/14/20 2104    bisacodyl (DULCOLAX) suppository 10 mg  10 mg Rectal Daily PRN Tonya Amado DO        ibuprofen (ADVIL;MOTRIN) tablet 400 mg  400 mg Oral Q6H PRN Tyra Queen DO        oxyCODONE-acetaminophen (PERCOCET) 5-325 MG per tablet 1 tablet  1 tablet Oral Q4H PRN Lexie Jean Baptiste MD   1 tablet at 05/15/20 0848    Or    oxyCODONE-acetaminophen (PERCOCET) 5-325 MG per tablet 2 tablet  2 tablet Oral Q4H PRN Lexie Jean Baptiste MD        scopolamine (TRANSDERM-SCOP) transdermal patch 1 patch  1 patch Transdermal Q72H Shree Ruiz MD   1 patch at 05/12/20 1819    sodium chloride flush 0.9 % injection 10 mL  10 mL Intravenous 2 times per day Lexie Jean Baptiste MD   10 mL at 05/15/20 0848    sodium chloride flush 0.9 % injection 10 mL  10 mL Intravenous PRN Genoveva Chow MD        acetaminophen (TYLENOL) tablet 650 mg  650 mg Oral Q6H PRN Genoveva Chow MD   650 mg at 05/14/20 1753    Or    acetaminophen (TYLENOL) suppository 650 mg  650 mg Rectal Q6H PRN Genoveva Chow MD        magnesium hydroxide (MILK OF MAGNESIA) 400 MG/5ML suspension 30 mL  30 mL Oral Daily PRN Genoveva Chow MD        promethazine (PHENERGAN) tablet 12.5 mg  12.5 mg Oral Q6H PRN Genoveva Chow MD   12.5 mg at 05/13/20 1916    Or    ondansetron (ZOFRAN) injection 4 mg  4 mg Intravenous Q6H PRN Genoveva Chow MD   4 mg at 05/14/20 2305    enoxaparin (LOVENOX) injection 40 mg  40 mg Subcutaneous Daily Genoveva Chow MD        aluminum & magnesium hydroxide-simethicone (MAALOX) 13 mL, lidocaine viscous hcl (XYLOCAINE) 2 mL (GI COCKTAIL)   Oral 4x Daily Genoveva Chow MD        metoclopramide (REGLAN) tablet 5 mg  5 mg Oral Nightly Genoveva Chow MD   5 mg at 05/14/20 2104    sucralfate (CARAFATE) tablet 1 g  1 g Oral 2 times per day Genoveva Chow MD   1 g at 05/15/20 0848    sodium chloride (OCEAN, BABY AYR) 0.65 % nasal spray 1 spray  1 spray Each Nostril PRN Genoveva Chow MD   1 spray at 05/11/20 1643    cetirizine (ZYRTEC) syrup 5 mg  5 mg Oral Daily Genoveva Chow MD   5 mg at 05/15/20 0848    pantoprazole (PROTONIX) tablet 40 mg  40 mg Oral BID AC Genoveva Chow MD   40 mg at 05/15/20 0631    LORazepam (ATIVAN) tablet 0.5 mg  0.5 mg Oral BID Genoveva Chow MD   0.5 mg at 05/15/20 0848     Scheduled Meds:   docusate sodium  200 mg Oral BID    scopolamine  1 patch Transdermal Q72H    sodium chloride flush  10 mL Intravenous 2 times per day    enoxaparin  40 mg Subcutaneous Daily    GI cocktail   Oral 4x Daily    metoclopramide  5 mg Oral Nightly    sucralfate  1 g Oral 2 times per day    cetirizine  5 mg Oral Daily    pantoprazole  40 mg Oral BID AC    LORazepam  0.5 mg Oral BID       Continuous Infusions:      Data: 05/15/2020    BUN 11 05/15/2020    CREATININE 0.6 05/15/2020    GFRAA >60 05/15/2020    LABGLOM >60 05/15/2020    GLUCOSE 83 05/15/2020    PROT 6.5 05/15/2020    LABALBU 3.4 05/15/2020    CALCIUM 9.4 05/15/2020    BILITOT 0.7 05/15/2020    ALKPHOS 77 05/15/2020    AST 65 05/15/2020    ALT 78 05/15/2020     BMP:    Lab Results   Component Value Date     05/15/2020    K 3.9 05/15/2020    K 3.7 12/10/2019     05/15/2020    CO2 24 05/15/2020    BUN 11 05/15/2020    LABALBU 3.4 05/15/2020    CREATININE 0.6 05/15/2020    CALCIUM 9.4 05/15/2020    GFRAA >60 05/15/2020    LABGLOM >60 05/15/2020    GLUCOSE 83 05/15/2020     Hepatic Function Panel:    Lab Results   Component Value Date    ALKPHOS 77 05/15/2020    ALT 78 05/15/2020    AST 65 05/15/2020    PROT 6.5 05/15/2020    BILITOT 0.7 05/15/2020    BILIDIR <0.2 05/15/2020    IBILI see below 05/15/2020    LABALBU 3.4 05/15/2020     Magnesium:    Lab Results   Component Value Date    MG 2.2 05/15/2020     Phosphorus:    Lab Results   Component Value Date    PHOS 3.4 05/15/2020     LDH:  No results found for: LDH  Uric Acid:  No results found for: LABURIC, URICACID  PT/INR:  No results found for: PROTIME, INR  Warfarin PT/INR:  No components found for: PTPATWAR, PTINRWAR  PTT:  No results found for: APTT, PTT[APTT}  Troponin:    Lab Results   Component Value Date    TROPONINI <0.01 05/08/2020     Last 3 Troponin:    Lab Results   Component Value Date    TROPONINI <0.01 05/08/2020    TROPONINI <0.01 12/10/2019    TROPONINI <0.01 07/12/2019     U/A:    Lab Results   Component Value Date    COLORU Yellow 05/09/2020    PROTEINU Negative 05/09/2020    PHUR 6.5 05/09/2020    WBCUA 2-5 05/09/2020    RBCUA 2-5 05/09/2020    BACTERIA RARE 05/09/2020    CLARITYU Clear 05/09/2020    SPECGRAV <=1.005 05/09/2020    LEUKOCYTESUR SMALL 05/09/2020    UROBILINOGEN 0.2 05/09/2020    BILIRUBINUR Negative 05/09/2020    BLOODU SMALL 05/09/2020    GLUCOSEU Negative 05/09/2020 HgBA1c:    Lab Results   Component Value Date    LABA1C 4.7 05/10/2020     FLP:  No results found for: TRIG, HDL, LDLCALC, LDLDIRECT, LABVLDL  TSH:  No results found for: TSH  VITAMIN B12: No components found for: B12  FOLATE:    Lab Results   Component Value Date    FOLATE 16.7 05/10/2020     AMYLASE:    Lab Results   Component Value Date    AMYLASE 39 05/14/2020     LIPASE:    Lab Results   Component Value Date    LIPASE 28 05/14/2020        CBC:  Recent Labs     05/13/20  0939 05/14/20  0345 05/15/20  0830   WBC 11.4 8.2 7.5   RBC 4.55 4.32 4.56   HGB 14.3 13.5 14.2   HCT 42.4 40.9 43.6    201 200   MCV 93.2 94.7 95.6   MCH 31.4 31.3 31.1   MCHC 33.7 33.0 32.6   RDW 11.9 12.1 12.1   LYMPHOPCT 12.9* 27.3 17.9*   MONOPCT 5.2 8.3 9.0   BASOPCT 0.1 0.2 0.4   MONOSABS 0.59 0.68 0.68   LYMPHSABS 1.47* 2.23 1.35*   EOSABS 0.00* 0.05 0.10   BASOSABS 0.01 0.02 0.03          H & H :  Recent Labs     05/13/20  0939 05/14/20  0345 05/15/20  0830   HGB 14.3 13.5 14.2       TSH:No results for input(s): TSH in the last 72 hours. GLUCOSE:No results for input(s): POCGLU in the last 72 hours. CMP:  Recent Labs     05/13/20  0939 05/14/20  0345 05/15/20  0830    138 138   K 4.3 3.7 3.9    103 103   CO2 25 25 24   BUN 15 18 11   CREATININE 0.7 0.8 0.6   GFRAA >60 >60 >60   LABGLOM >60 >60 >60   GLUCOSE 144* 136* 83   PROT 6.2* 6.0* 6.5   LABALBU 3.7 3.3* 3.4*   CALCIUM 9.2 8.9 9.4   BILITOT 0.8 0.7 0.7   ALKPHOS 56 55 77   AST 40* 38* 65*   ALT 39* 41* 78*        BNP:No results found for: BNP    PROTIME/INR:No results for input(s): PROTIME, INR in the last 72 hours. CRP:   No results for input(s): CRP in the last 72 hours. ESR:  No results for input(s): SEDRATE in the last 72 hours.     LIPASE , AMYLASE:  Lab Results   Component Value Date    LIPASE 28 05/14/2020      Lab Results   Component Value Date    AMYLASE 39 05/14/2020       ABGs: No results found for: PH, PO2, PCO2    CARDIAC:   No results

## 2020-05-15 NOTE — DISCHARGE SUMMARY
represent a hemangioma of the liver. Can corroborate with  ultrasound to document that this is a hyperechoic lesion. Gallbladder is nondistended. Multiple gallstones are seen. The biliary  tree and pancreatic ductal systems are not dilated. There is a  diverticulum in the second segment of the duodenum where the papilla  is located. There is normal size and enhancement for the pancreas. The pancreatic  ductal system is not dilated. There is normal size and enhancement for the spleen. Adrenals not enlarged. Kidneys have preserved size and cortical thickness, there is no  obstruction. A  parapelvic cyst is seen in the left kidney. There is a focal enhancing exophytic lesion of the mid third of the  right kidney, it represents a suspicious renal mass, it measures about  the 11 x 6 mm. These findings will required a dedicated evaluation for  the right kidney with a multiphase IV contrast study CT or  MRI, to be  performed on more routine bases. Aorta and IVC appear unremarkable. There is no midline retroperitoneal  adenopathy. Uterus and ovaries have unremarkable appearance. The bladder appear  unremarkable. The appendix seen and appears unremarkable. There is no pericolonic  inflammatory process. Lower lung bases demonstrate no significant findings. There is a moderate to size hiatal hernia. This was also observed  previously. Bone structures demonstrate degenerative changes in the thoracolumbar  spine.       Impression:       1. No acute inflammatory changes omental mesenteric fat planes, free  intraperitoneal air, ascites or indication for bowel obstruction. 2. Presence of multiple gallstones. 3. No dilatation biliary tree or pancreatic ductal system. 4. No obstructive uropathy. 5. Suspicious exophytic lesion in the right kidney measuring 11 x 6  mm. The see above comments and recommendations. 6. Presence of a moderate size hiatal hernia.  No signs for  obstruction. --Past history negative rheumatic fever scarlet fever polio diphtheria cancer diabetes  --Patient was having chest pain 2012 seen by cardiology, atypical chest pain referred to GI found to have hiatal hernia  --Denies any recurring pulmonary difficulties  --Chronic postnasal drainage from sinuses; takes Zyrtec had been using Flonase nasal spray stopped it because of nasal bleeding and irritation  --History of previous kidney stones  --Patient is on chronic Protonix once daily in the morning; she received prescription for Carafate but has not filled it as yet  --Earlier today the patient \"tripped\" on her feet fell to the ground with no injuries reported.         5/10/2020-sUBJECTIVE: Gilles Viera is alert awake and cooperative; oriented ×3. Denies any chest pain, dyspnea. She still having postprandial nausea. She still requires Tigan 3 times a day straight. She has had no emesis. She slept fairly well overnight. Temperature 98.2 respirations 16 pulse 81 blood pressure 127/69.  98% saturation on room air. Sodium 139 potassium 3.6 chloride 105 CO2 24 BUN 13 creatinine 0.8 magnesium 2.4 glucose 93 lactic acid from yesterday 1.6. Calcium 9.2 phosphorus 3.5 protein 6.0 albumin 3.8 liver functions normal.  Procalcitonin 0.05. Troponins negative x3. A1c 4.7. Hemoglobin 13.6 WBC 5.4 platelets 897. B12 low 169 folate 16.7 normal.  Sed rate 5. Strep by PCR negative. Surgical note from today appreciated. Patient still having postprandial nausea which is relatively new for her. Surgery suggested HIDA scan and then trial of antibiotics. If negative robotic assisted laparoscopic cholecystectomy.     5/11/2020-patient sitting quietly in bed; no chest pain no dyspnea. Still having intermittent nausea with eating. No further emesis no abdominal pains. No diarrhea.  After rounds yesterday, patient and floor phoned me with numerous questions regarding HIDA scan, CCK, duration of HIDA scan etc. etc. all answered at length. Temperature 98.0 respirations 14 pulse 85 blood pressure 127/80.  99% saturation on room air. Sodium 139 potassium 3.6 chloride 105 CO2 26 BUN 17 creatinine 0.8 magnesium 2.4 glucose 95 calcium 9.3 phosphorus 3.3 protein 6.2 albumin 3.9 pro calcitonin 0.05. CRP less than 0.1. Liver functions normal.  HIDA scan with following results--          Findings:    The patient was injected with 8.2 mCi of technetium mebrofenin. Clearance of radiotracer from the blood pool was normal    Excretion of radiotracer from the liver appears to be normal    Excretion of radiotracer into the biliary tree appears to be normal    Excretion of tracer into the small bowel appears to be normal.    The gallbladder was visualized            Impression:       1.  Unremarkable study         5/12/2020-patient laying quietly in bed; no chest pain no dyspnea. She is had no bowel movement since admission. Slept well overnight no nausea this morning. Temperature 98.5 respirations 16 pulse 88 blood pressure 119/71.  97% saturation on room air. Sodium 140 potassium 3.9 chloride 105 CO2 24 BUN 14 creatinine 0.8 magnesium 2.5 glucose 100 calcium 9.3 phosphorus 4.3 protein 6.3 albumin 3.9 liver functions normal.  Surgical resident note from this morning appreciated; lap puma this afternoon. Dietary note appreciated; Ensure clear will start after surgery.        5/13/2020-patient sitting quietly in bed; no chest pain no dyspnea. Postoperative yesterday she had severe nausea likely anesthetic related requiring IV medications. She has had 2 episodes of significant nausea today, both postprandial after breakfast and after lunch. She has had no bowel movement since 5/8/2020. As previously, patient lives alone, no help. She has been up walking without severe pain however most of the pain occurs as she is getting out of bed, all right upper quadrant. Because of that she is reluctant to get out of bed.   Temperature 98.3 respirations 18 pulse 90 blood pressure 110/59.  97% saturation on room air. Sodium 138 potassium 4.3 chloride 104 CO2 25 BUN 15 creatinine 0.7 magnesium 2.0 glucose 144 calcium 9.2 phosphorus 3.6 protein 6.2 albumin 3.7 ALT/AST 39/40 respectively. Slightly elevated. Hemoglobin 14.3 WBC 11.4 platelets 323. She underwent robotic assisted laparoscopic cholecystectomy yesterday. Surgical note from today appreciated, low-fat diet ambulate possible discharge if tolerates diet.        5/14/2020-patient sitting quietly in bed no chest pain no dyspnea. Still having mild episodes of nausea; appetite fair ate half of her breakfast half of her lunch. She is still not had bowel movement. She did try concoction of prune juice with apple juice this morning still no results. She still leery of being discharged home; has no help. Still has significant right abdominal pain with trying to move from a laying to a standing position and vice versa. When she is up and walking, pain is quite tolerable. No dysuria . Temperature 100.2 respirations 18 pulse 100 blood pressure 133/70.  97% saturation on room air. Sodium 138 potassium 3.7 chloride 103 CO2 25 BUN 18 creatinine 0.8 magnesium 2.2 glucose 136 calcium 8.9 phosphorus 2.4 protein 6.0 albumin 3.3 ALT 41 AST 38 remainder of liver functions normal.  Amylase and lipase normal.  Hemoglobin 13.5 WBC 8.2 platelets 422. Surgical pathology remains pending.        5/15/2020-patient sitting quietly in bed; no chest pain no dyspnea. She did have significant bowel movements most of the evening, loose bowels have stopped at this time. Minimal nausea if any. Numerous questions again answered at length; regarding calcium intake, low-fat diet, activity etc.  Temperature 99.3 respirations 16 pulse 91 blood pressure 122/60.  98% saturation on room air.   Sodium 138 potassium 3.9 chloride 103 CO2 24 BUN 11 creatinine 0.6 magnesium 2.2 glucose 83 calcium 9.4 phosphorus 3.4 protein 6.5 I advised her regarding asking for Dulcolax suppository if needed  She continues on low fiber diet per surgery  Continue Colace 200 mg twice daily  IV potassium phosphate today  Continue to monitor labs  Blood cultures if temperature greater than 101 Fahrenheit      Instructions at discharge:  Med reconciliation completed  Prescriptions written  Follow-up PCP 1 week  Follow-up surgery per their recommendation  Continue low-fat diet, copy to be given to patient  Percocet 5 # 12  Reglan 5 mg at bedtime for 1 month then stop; Protonix 40 mg twice a day for 1 month then reduce to once daily    Condition at DISCHARGE : Ruth [id number]     Discharged to: Home    Discharge Instructions: Medications reviewed with patient    Consults:general surgery     Past Medical Hx :   Past Medical History:   Diagnosis Date    Allergic rhinitis     Anxiety     Asymptomatic gallstones 2020    B12 deficiency 5/10/2020    Hemangioma of liver     Hernia, hiatal     Intractable vomiting with nausea 5/9/2020    Kidney stone     Renal mass, right     Exophytic 11 x 6 mm       Past Surgical Hx :  Past Surgical History:   Procedure Laterality Date    CHOLECYSTECTOMY, LAPAROSCOPIC N/A 5/12/2020    LAPAROSCOPIC ROBOTIC ASSISTED CHOLECYSTECTOMY performed by Amy Haley MD at [facility] COLONOSCOPY  2017    Negative findings    CYST REMOVAL      fatty cyst removed from back   [facility] ENDOSCOPY  01/2020    Hiatal hernia       Labs:   CBC:   Lab Results   Component Value Date    WBC 7.5 05/15/2020    RBC 4.56 05/15/2020    HGB 14.2 05/15/2020    HCT 43.6 05/15/2020    MCV 95.6 05/15/2020    MCH 31.1 05/15/2020    MCHC 32.6 05/15/2020    RDW 12.1 05/15/2020     05/15/2020    MPV 9.5 05/15/2020     CBC with Differential:    Lab Results   Component Value Date    WBC 7.5 05/15/2020    RBC 4.56 05/15/2020    HGB 14.2 05/15/2020    HCT 43.6 05/15/2020     05/15/2020    MCV 95.6 05/15/2020    MCH 31.1 05/15/2020    MCHC 32.6 05/15/2020    RDW 12.1 05/15/2020    LYMPHOPCT 17.9 05/15/2020    MONOPCT 9.0 05/15/2020    BASOPCT 0.4 05/15/2020    MONOSABS 0.68 05/15/2020    LYMPHSABS 1.35 05/15/2020    EOSABS 0.10 05/15/2020    BASOSABS 0.03 05/15/2020     Hemoglobin/Hematocrit:    Lab Results   Component Value Date    HGB 14.2 05/15/2020    HCT 43.6 05/15/2020     CMP:    Lab Results   Component Value Date     05/15/2020    K 3.9 05/15/2020    K 3.7 12/10/2019     05/15/2020    CO2 24 05/15/2020    BUN 11 05/15/2020    CREATININE 0.6 05/15/2020    GFRAA >60 05/15/2020    LABGLOM >60 05/15/2020    GLUCOSE 83 05/15/2020    PROT 6.5 05/15/2020    LABALBU 3.4 05/15/2020    CALCIUM 9.4 05/15/2020    BILITOT 0.7 05/15/2020    ALKPHOS 77 05/15/2020    AST 65 05/15/2020    ALT 78 05/15/2020     BMP:    Lab Results   Component Value Date     05/15/2020    K 3.9 05/15/2020    K 3.7 12/10/2019     05/15/2020    CO2 24 05/15/2020    BUN 11 05/15/2020    LABALBU 3.4 05/15/2020    CREATININE 0.6 05/15/2020    CALCIUM 9.4 05/15/2020    GFRAA >60 05/15/2020    LABGLOM >60 05/15/2020    GLUCOSE 83 05/15/2020     Hepatic Function Panel:    Lab Results   Component Value Date    ALKPHOS 77 05/15/2020    ALT 78 05/15/2020    AST 65 05/15/2020    PROT 6.5 05/15/2020    BILITOT 0.7 05/15/2020    BILIDIR <0.2 05/15/2020    IBILI see below 05/15/2020    LABALBU 3.4 05/15/2020     Ionized Calcium:  No results found for: IONCA  Magnesium:    Lab Results   Component Value Date    MG 2.2 05/15/2020     Phosphorus:    Lab Results   Component Value Date    PHOS 3.4 05/15/2020     LDH:  No results found for: LDH  Uric Acid:  No results found for: LABURIC, URICACID  PT/INR:  No results found for: PROTIME, INR  Warfarin PT/INR:  No components found for: PTPATWAR, PTINRWAR  PTT:  No results found for: APTT, PTT[APTT}  Troponin:    Lab Results   Component Value Date    TROPONINI <0.01 >60 >60 >60   LABGLOM >60 >60 >60   GLUCOSE 144* 136* 83   PROT 6.2* 6.0* 6.5   LABALBU 3.7 3.3* 3.4*   CALCIUM 9.2 8.9 9.4   BILITOT 0.8 0.7 0.7   ALKPHOS 56 55 77   AST 40* 38* 65*   ALT 39* 41* 78*         BNP:No results found for: BNP    PROTIME/INR:No results for input(s): PROTIME, INR in the last 72 hours. CRP: No results for input(s): CRP in the last 72 hours. ESR:No results for input(s): SEDRATE in the last 72 hours. LIPASE , AMYLASE:  Lab Results   Component Value Date    LIPASE 28 2020      Lab Results   Component Value Date    AMYLASE 39 2020       ABGs: No results found for: PHART, PO2ART, [id number]    CARDIAC: No results for input(s): CKTOTAL, CKMB, CKMBINDEX, TROPONINI in the last 72 hours. Lipid Profile: No results found for: TRIG, HDL, LDLCALC, CHOL     Nm Hepatobiliary    Result Date: 2020  Patient MRN: [id number] : 1955 Age:  63 years Gender: Female Order Date: 5/10/2020 10:30 AM Exam: NM HEPATOBILIARY Number of Images: 9 views Indication:   Cholelithiasis Cholelithiasis Comparison: CT scan abdomen 2020 Findings: The patient was injected with 8.2 mCi of technetium mebrofenin. Clearance of radiotracer from the blood pool was normal Excretion of radiotracer from the liver appears to be normal Excretion of radiotracer into the biliary tree appears to be normal Excretion of tracer into the small bowel appears to be normal. The gallbladder was visualized     1. Unremarkable study     Ct Abdomen Pelvis W Iv Contrast Additional Contrast? None    Result Date: 2020  Patient MRN:  [id number] : 1955 Age: 63 years Gender: Female Order Date:  2020 10:00 PM TECHNIQUE/NUMBER OF IMAGES/COMPARISON/CLINICAL HISTORY: CT abdomen pelvis IV contrast After IV contrast axial images were obtained with sagittal and coronal reconstructions 372 images One 100 mL of Isovue-40 27-year-old female patient with history of vomiting nausea. . Patient previous hysterectomy. [facility] Comparison August 2019, 2019 FINDINGS: There are no acute inflammatory changes in the omental mesenteric fat planes, free intraperitoneal air, ascites or indication for bowel obstruction. There is a focal area of nonhomogeneous enhancement in the segment 6 of the right lobe liver which is stable since the previous study. This can represent a hemangioma of the liver. Can corroborate with ultrasound to document that this is a hyperechoic lesion. Gallbladder is nondistended. Multiple gallstones are seen. The biliary tree and pancreatic ductal systems are not dilated. There is a diverticulum in the second segment of the duodenum where the papilla is located. There is normal size and enhancement for the pancreas. The pancreatic ductal system is not dilated. There is normal size and enhancement for the spleen. Adrenals not enlarged. Kidneys have preserved size and cortical thickness, there is no obstruction. A  parapelvic cyst is seen in the left kidney. There is a focal enhancing exophytic lesion of the mid third of the right kidney, it represents a suspicious renal mass, it measures about the 11 x 6 mm. These findings will required a dedicated evaluation for the right kidney with a multiphase IV contrast study CT or  MRI, to be performed on more routine bases. Aorta and IVC appear unremarkable. There is no midline retroperitoneal adenopathy. Uterus and ovaries have unremarkable appearance. The bladder appear unremarkable. The appendix seen and appears unremarkable. There is no pericolonic inflammatory process. Lower lung bases demonstrate no significant findings. There is a moderate to size hiatal hernia. This was also observed previously. Bone structures demonstrate degenerative changes in the thoracolumbar spine. 1. No acute inflammatory changes omental mesenteric fat planes, free intraperitoneal air, ascites or indication for bowel obstruction. 2. Presence of multiple gallstones.  3. No dilatation biliary tree or pancreatic ductal system. 4. No obstructive uropathy. 5. Suspicious exophytic lesion in the right kidney measuring 11 x 6 mm. The see above comments and recommendations. 6. Presence of a moderate size hiatal hernia. No signs for obstruction. Discharge Exam:  See progress note from today    Current Discharge Medication List      START taking these medications    Details   sucralfate (CARAFATE) 1 GM tablet Take 1 tablet by mouth every 12 hours  Qty: 120 tablet, Refills: 3      ibuprofen (ADVIL;MOTRIN) 400 MG tablet Take 1 tablet by mouth every 6 hours as needed for Pain  Qty: 120 tablet, Refills: 3      sodium chloride (OCEAN, BABY AYR) 0.65 % nasal spray 1 spray by Nasal route as needed for Congestion  Refills: 0      oxyCODONE-acetaminophen (PERCOCET) 5-325 MG per tablet Take 1 tablet by mouth every 6 hours as needed for Pain for up to 3 days. Intended supply: 3 days. Take lowest dose possible to manage pain  Qty: 12 tablet, Refills: 0    Comments: Reduce doses taken as pain becomes manageable  Associated Diagnoses: S/P laparoscopic cholecystectomy         CONTINUE these medications which have CHANGED    Details   ondansetron (ZOFRAN-ODT) 4 MG disintegrating tablet Take 1 tablet by mouth 3 times daily as needed for Nausea or Vomiting  Qty: 21 tablet, Refills: 0      metoclopramide (REGLAN) 5 MG tablet Take 1 tablet by mouth nightly  Qty: 30 tablet, Refills: 1      pantoprazole (PROTONIX) 40 MG tablet Take 1 tablet by mouth 2 times daily (before meals)  Qty: 60 tablet, Refills: 3         CONTINUE these medications which have NOT CHANGED    Details   triamcinolone (ARISTOCORT) 0.5 % cream Apply topically 2 times daily as needed      LORazepam (ATIVAN) 0.5 MG tablet Take 0.5 mg by mouth 2 times daily.       cetirizine (ZYRTEC) 10 MG tablet Take 10 mg by mouth daily         STOP taking these medications       gi cocktail (GI COCKTAIL) Comments:   Reason for Stopping:         docusate sodium (COLACE) 100 MG capsule

## 2020-05-15 NOTE — PROGRESS NOTES
5/15/2020  12:23 PM      Nutrition Education    Type and Reason for Visit: Consult, Patient Education    Nutrition Assessment:  Verbally reviewed current Low Fat Diet with patient. She also had several questions about recommended foods for her Hiatal hernia. Encouraged pt to continue to follow the Low Fat diet restrictions for her Hiatal hernia as well. Attached a written Low Fat Diet Copy and Hiatal Hernia Nutrition Guidelines to Discharge Instructions/AVS. Also provided RD contact information and encouraged pt to call with any questions. · Verbally reviewed information with Patient  · Written educational materials provided. · Contact name and number provided.     Electronically signed by Jaxon Montes RD, CNSC, LD on 5/15/20 at 12:23 PM EDT    Contact Number: [phone]

## 2020-05-16 LAB — URINE CULTURE, ROUTINE: NORMAL

## 2020-06-19 ENCOUNTER — APPOINTMENT (OUTPATIENT)
Dept: CT IMAGING | Age: 65
DRG: 241 | End: 2020-06-19
Payer: COMMERCIAL

## 2020-06-19 ENCOUNTER — APPOINTMENT (OUTPATIENT)
Dept: GENERAL RADIOLOGY | Age: 65
DRG: 241 | End: 2020-06-19
Payer: COMMERCIAL

## 2020-06-19 ENCOUNTER — HOSPITAL ENCOUNTER (INPATIENT)
Age: 65
LOS: 3 days | Discharge: HOME | DRG: 241 | End: 2020-06-22
Attending: EMERGENCY MEDICINE | Admitting: INTERNAL MEDICINE
Payer: COMMERCIAL

## 2020-06-19 LAB
ALBUMIN SERPL-MCNC: 4.5 G/DL (ref 3.5–5.2)
ALP BLD-CCNC: 72 U/L (ref 35–104)
ALT SERPL-CCNC: 13 U/L (ref 0–32)
ANION GAP SERPL CALCULATED.3IONS-SCNC: 14 MMOL/L (ref 7–16)
AST SERPL-CCNC: 16 U/L (ref 0–31)
BASOPHILS ABSOLUTE: 0.03 E9/L (ref 0–0.2)
BASOPHILS RELATIVE PERCENT: 0.4 % (ref 0–2)
BILIRUB SERPL-MCNC: 0.8 MG/DL (ref 0–1.2)
BUN BLDV-MCNC: 7 MG/DL (ref 8–23)
CALCIUM SERPL-MCNC: 10.5 MG/DL (ref 8.6–10.2)
CHLORIDE BLD-SCNC: 107 MMOL/L (ref 98–107)
CO2: 19 MMOL/L (ref 22–29)
CREAT SERPL-MCNC: 0.7 MG/DL (ref 0.5–1)
EOSINOPHILS ABSOLUTE: 0.01 E9/L (ref 0.05–0.5)
EOSINOPHILS RELATIVE PERCENT: 0.1 % (ref 0–6)
GFR AFRICAN AMERICAN: >60
GFR NON-AFRICAN AMERICAN: >60 ML/MIN/1.73
GLUCOSE BLD-MCNC: 101 MG/DL (ref 74–99)
HCT VFR BLD CALC: 47.1 % (ref 34–48)
HEMOGLOBIN: 16.6 G/DL (ref 11.5–15.5)
IMMATURE GRANULOCYTES #: 0.01 E9/L
IMMATURE GRANULOCYTES %: 0.1 % (ref 0–5)
LYMPHOCYTES ABSOLUTE: 1.68 E9/L (ref 1.5–4)
LYMPHOCYTES RELATIVE PERCENT: 23.6 % (ref 20–42)
MAGNESIUM: 1.8 MG/DL (ref 1.6–2.6)
MCH RBC QN AUTO: 31.6 PG (ref 26–35)
MCHC RBC AUTO-ENTMCNC: 35.2 % (ref 32–34.5)
MCV RBC AUTO: 89.5 FL (ref 80–99.9)
MONOCYTES ABSOLUTE: 0.48 E9/L (ref 0.1–0.95)
MONOCYTES RELATIVE PERCENT: 6.7 % (ref 2–12)
NEUTROPHILS ABSOLUTE: 4.92 E9/L (ref 1.8–7.3)
NEUTROPHILS RELATIVE PERCENT: 69.1 % (ref 43–80)
PDW BLD-RTO: 12.3 FL (ref 11.5–15)
PLATELET # BLD: 278 E9/L (ref 130–450)
PMV BLD AUTO: 9.5 FL (ref 7–12)
POTASSIUM SERPL-SCNC: 4.1 MMOL/L (ref 3.5–5)
PRO-BNP: 26 PG/ML (ref 0–125)
RBC # BLD: 5.26 E12/L (ref 3.5–5.5)
SODIUM BLD-SCNC: 140 MMOL/L (ref 132–146)
T4 FREE: 1.54 NG/DL (ref 0.93–1.7)
TOTAL PROTEIN: 7.3 G/DL (ref 6.4–8.3)
TROPONIN: <0.01 NG/ML (ref 0–0.03)
TROPONIN: <0.01 NG/ML (ref 0–0.03)
TSH SERPL DL<=0.05 MIU/L-ACNC: 1.3 UIU/ML (ref 0.27–4.2)
WBC # BLD: 7.1 E9/L (ref 4.5–11.5)

## 2020-06-19 PROCEDURE — 83735 ASSAY OF MAGNESIUM: CPT

## 2020-06-19 PROCEDURE — 94760 N-INVAS EAR/PLS OXIMETRY 1: CPT

## 2020-06-19 PROCEDURE — 6360000004 HC RX CONTRAST MEDICATION: Performed by: RADIOLOGY

## 2020-06-19 PROCEDURE — 99285 EMERGENCY DEPT VISIT HI MDM: CPT

## 2020-06-19 PROCEDURE — 84439 ASSAY OF FREE THYROXINE: CPT

## 2020-06-19 PROCEDURE — 84484 ASSAY OF TROPONIN QUANT: CPT

## 2020-06-19 PROCEDURE — 74177 CT ABD & PELVIS W/CONTRAST: CPT

## 2020-06-19 PROCEDURE — 93005 ELECTROCARDIOGRAM TRACING: CPT | Performed by: EMERGENCY MEDICINE

## 2020-06-19 PROCEDURE — 84443 ASSAY THYROID STIM HORMONE: CPT

## 2020-06-19 PROCEDURE — 2580000003 HC RX 258: Performed by: STUDENT IN AN ORGANIZED HEALTH CARE EDUCATION/TRAINING PROGRAM

## 2020-06-19 PROCEDURE — 80053 COMPREHEN METABOLIC PANEL: CPT

## 2020-06-19 PROCEDURE — 6360000002 HC RX W HCPCS: Performed by: STUDENT IN AN ORGANIZED HEALTH CARE EDUCATION/TRAINING PROGRAM

## 2020-06-19 PROCEDURE — 1200000000 HC SEMI PRIVATE

## 2020-06-19 PROCEDURE — 2580000003 HC RX 258: Performed by: INTERNAL MEDICINE

## 2020-06-19 PROCEDURE — 71045 X-RAY EXAM CHEST 1 VIEW: CPT

## 2020-06-19 PROCEDURE — 83880 ASSAY OF NATRIURETIC PEPTIDE: CPT

## 2020-06-19 PROCEDURE — 85025 COMPLETE CBC W/AUTO DIFF WBC: CPT

## 2020-06-19 PROCEDURE — 96372 THER/PROPH/DIAG INJ SC/IM: CPT

## 2020-06-19 PROCEDURE — 6370000000 HC RX 637 (ALT 250 FOR IP): Performed by: STUDENT IN AN ORGANIZED HEALTH CARE EDUCATION/TRAINING PROGRAM

## 2020-06-19 RX ORDER — SODIUM CHLORIDE 0.9 % (FLUSH) 0.9 %
10 SYRINGE (ML) INJECTION EVERY 12 HOURS SCHEDULED
Status: DISCONTINUED | OUTPATIENT
Start: 2020-06-19 | End: 2020-06-22 | Stop reason: HOSPADM

## 2020-06-19 RX ORDER — ASPIRIN 81 MG/1
324 TABLET, CHEWABLE ORAL ONCE
Status: COMPLETED | OUTPATIENT
Start: 2020-06-19 | End: 2020-06-19

## 2020-06-19 RX ORDER — POLYETHYLENE GLYCOL 3350 17 G/17G
17 POWDER, FOR SOLUTION ORAL DAILY PRN
COMMUNITY
End: 2020-07-23

## 2020-06-19 RX ORDER — ACETAMINOPHEN 650 MG/1
650 SUPPOSITORY RECTAL EVERY 6 HOURS PRN
Status: DISCONTINUED | OUTPATIENT
Start: 2020-06-19 | End: 2020-06-22 | Stop reason: HOSPADM

## 2020-06-19 RX ORDER — METOCLOPRAMIDE 10 MG/1
5 TABLET ORAL NIGHTLY
Status: DISCONTINUED | OUTPATIENT
Start: 2020-06-19 | End: 2020-06-22 | Stop reason: HOSPADM

## 2020-06-19 RX ORDER — LORAZEPAM 2 MG/ML
1 INJECTION INTRAMUSCULAR EVERY 6 HOURS PRN
Status: DISCONTINUED | OUTPATIENT
Start: 2020-06-19 | End: 2020-06-22 | Stop reason: HOSPADM

## 2020-06-19 RX ORDER — PANTOPRAZOLE SODIUM 40 MG/1
40 TABLET, DELAYED RELEASE ORAL
Status: DISCONTINUED | OUTPATIENT
Start: 2020-06-20 | End: 2020-06-22 | Stop reason: HOSPADM

## 2020-06-19 RX ORDER — 0.9 % SODIUM CHLORIDE 0.9 %
1000 INTRAVENOUS SOLUTION INTRAVENOUS ONCE
Status: COMPLETED | OUTPATIENT
Start: 2020-06-19 | End: 2020-06-19

## 2020-06-19 RX ORDER — SODIUM CHLORIDE 9 MG/ML
1000 INJECTION, SOLUTION INTRAVENOUS CONTINUOUS
Status: DISCONTINUED | OUTPATIENT
Start: 2020-06-19 | End: 2020-06-19

## 2020-06-19 RX ORDER — SUCRALFATE 1 G/1
1 TABLET ORAL EVERY 12 HOURS SCHEDULED
Status: DISCONTINUED | OUTPATIENT
Start: 2020-06-19 | End: 2020-06-22 | Stop reason: HOSPADM

## 2020-06-19 RX ORDER — PAROXETINE 10 MG/1
10 TABLET, FILM COATED ORAL EVERY MORNING
COMMUNITY
Start: 2020-06-09 | End: 2020-07-23 | Stop reason: ALTCHOICE

## 2020-06-19 RX ORDER — POLYETHYLENE GLYCOL 3350 17 G/17G
17 POWDER, FOR SOLUTION ORAL DAILY PRN
Status: DISCONTINUED | OUTPATIENT
Start: 2020-06-19 | End: 2020-06-22 | Stop reason: HOSPADM

## 2020-06-19 RX ORDER — IBUPROFEN 400 MG/1
400 TABLET ORAL EVERY 6 HOURS PRN
Status: DISCONTINUED | OUTPATIENT
Start: 2020-06-19 | End: 2020-06-22 | Stop reason: HOSPADM

## 2020-06-19 RX ORDER — SODIUM CHLORIDE, SODIUM LACTATE, POTASSIUM CHLORIDE, CALCIUM CHLORIDE 600; 310; 30; 20 MG/100ML; MG/100ML; MG/100ML; MG/100ML
INJECTION, SOLUTION INTRAVENOUS CONTINUOUS
Status: DISCONTINUED | OUTPATIENT
Start: 2020-06-19 | End: 2020-06-20

## 2020-06-19 RX ORDER — ACETAMINOPHEN 325 MG/1
650 TABLET ORAL EVERY 6 HOURS PRN
Status: DISCONTINUED | OUTPATIENT
Start: 2020-06-19 | End: 2020-06-22 | Stop reason: HOSPADM

## 2020-06-19 RX ORDER — SODIUM CHLORIDE 0.9 % (FLUSH) 0.9 %
10 SYRINGE (ML) INJECTION PRN
Status: DISCONTINUED | OUTPATIENT
Start: 2020-06-19 | End: 2020-06-22 | Stop reason: HOSPADM

## 2020-06-19 RX ORDER — CETIRIZINE HYDROCHLORIDE 10 MG/1
10 TABLET ORAL DAILY
Status: DISCONTINUED | OUTPATIENT
Start: 2020-06-20 | End: 2020-06-22 | Stop reason: HOSPADM

## 2020-06-19 RX ORDER — LORAZEPAM 2 MG/ML
1 INJECTION INTRAMUSCULAR ONCE
Status: COMPLETED | OUTPATIENT
Start: 2020-06-19 | End: 2020-06-19

## 2020-06-19 RX ADMIN — TRIMETHOBENZAMIDE HYDROCHLORIDE 200 MG: 100 INJECTION INTRAMUSCULAR at 17:26

## 2020-06-19 RX ADMIN — SODIUM CHLORIDE 1000 ML: 9 INJECTION, SOLUTION INTRAVENOUS at 19:50

## 2020-06-19 RX ADMIN — SODIUM CHLORIDE, POTASSIUM CHLORIDE, SODIUM LACTATE AND CALCIUM CHLORIDE: 600; 310; 30; 20 INJECTION, SOLUTION INTRAVENOUS at 23:59

## 2020-06-19 RX ADMIN — IOPAMIDOL 110 ML: 755 INJECTION, SOLUTION INTRAVENOUS at 20:03

## 2020-06-19 RX ADMIN — ASPIRIN 81 MG 324 MG: 81 TABLET ORAL at 17:26

## 2020-06-19 RX ADMIN — LORAZEPAM 1 MG: 2 INJECTION INTRAMUSCULAR; INTRAVENOUS at 19:56

## 2020-06-19 RX ADMIN — LIDOCAINE HYDROCHLORIDE: 20 SOLUTION ORAL; TOPICAL at 18:54

## 2020-06-19 RX ADMIN — SODIUM CHLORIDE 1000 ML: 9 INJECTION, SOLUTION INTRAVENOUS at 17:38

## 2020-06-19 ASSESSMENT — ENCOUNTER SYMPTOMS
DIARRHEA: 0
RHINORRHEA: 0
COUGH: 0
BACK PAIN: 0
BLOOD IN STOOL: 0
VOMITING: 0
WHEEZING: 0
SHORTNESS OF BREATH: 0
NAUSEA: 1
SORE THROAT: 0
CONSTIPATION: 0
CHEST TIGHTNESS: 0
ABDOMINAL PAIN: 0

## 2020-06-19 NOTE — ED PROVIDER NOTES
Rate: 100  Rhythm: Sinus  Interpretation: Sinus tachycardia with normal axis. Prolonged QTC at 518. No ST elevation or depression. No sign of acute ischemia. Comparison: stable as compared to patient's most recent EKG           --------------------------------------------- PAST HISTORY ---------------------------------------------  Past Medical History:  has a past medical history of Allergic rhinitis, Anxiety, Asymptomatic gallstones, B12 deficiency, Hemangioma of liver, Hernia, hiatal, Intractable vomiting with nausea, Kidney stone, Renal mass, right, and Skin cancer (melanoma) ([facility] [address] Past Surgical History:  has a past surgical history that includes cyst removal; Tubal ligation (1997); Upper gastrointestinal endoscopy (01/2020); Colonoscopy (2017); Hysterectomy; and Cholecystectomy, laparoscopic (N/A, 5/12/2020). Social History:  reports that she has never smoked. She has never used smokeless tobacco. She reports that she does not drink alcohol or use drugs. Family History: family history includes Cancer in her father; High Cholesterol in her sister; Other in her mother. The patients home medications have been reviewed. Allergies: Patient has no known allergies.     -------------------------------------------------- RESULTS -------------------------------------------------    LABS:  Results for orders placed or performed during the hospital encounter of 06/19/20   CBC auto differential   Result Value Ref Range    WBC 7.1 4.5 - 11.5 E9/L    RBC 5.26 3.50 - 5.50 E12/L    Hemoglobin 16.6 (H) 11.5 - 15.5 g/dL    Hematocrit 47.1 34.0 - 48.0 %    MCV 89.5 80.0 - 99.9 fL    MCH 31.6 26.0 - 35.0 pg    MCHC 35.2 (H) 32.0 - 34.5 %    RDW 12.3 11.5 - 15.0 fL    Platelets 820 077 - 371 E9/L    MPV 9.5 7.0 - 12.0 fL    Neutrophils % 69.1 43.0 - 80.0 %    Immature Granulocytes % 0.1 0.0 - 5.0 %    Lymphocytes % 23.6 20.0 - 42.0 %    Monocytes % 6.7 2.0 - 12.0 %    Eosinophils % 0.1 0.0 - 6.0 %    Basophils % 0.4 0.0 - 2.0 %    Neutrophils Absolute 4.92 1.80 - 7.30 E9/L    Immature Granulocytes # 0.01 E9/L    Lymphocytes Absolute 1.68 1.50 - 4.00 E9/L    Monocytes Absolute 0.48 0.10 - 0.95 E9/L    Eosinophils Absolute 0.01 (L) 0.05 - 0.50 E9/L    Basophils Absolute 0.03 0.00 - 0.20 E9/L   Troponin   Result Value Ref Range    Troponin <0.01 0.00 - 0.03 ng/mL   Comprehensive Metabolic Panel   Result Value Ref Range    Sodium 140 132 - 146 mmol/L    Potassium 4.1 3.5 - 5.0 mmol/L    Chloride 107 98 - 107 mmol/L    CO2 19 (L) 22 - 29 mmol/L    Anion Gap 14 7 - 16 mmol/L    Glucose 101 (H) 74 - 99 mg/dL    BUN 7 (L) 8 - 23 mg/dL    CREATININE 0.7 0.5 - 1.0 mg/dL    GFR Non-African American >60 >=60 mL/min/1.73    GFR African American >60     Calcium 10.5 (H) 8.6 - 10.2 mg/dL    Total Protein 7.3 6.4 - 8.3 g/dL    Alb 4.5 3.5 - 5.2 g/dL    Total Bilirubin 0.8 0.0 - 1.2 mg/dL    Alkaline Phosphatase 72 35 - 104 U/L    ALT 13 0 - 32 U/L    AST 16 0 - 31 U/L   Brain Natriuretic Peptide   Result Value Ref Range    Pro-BNP 26 0 - 125 pg/mL   TSH without Reflex   Result Value Ref Range    TSH 1.300 0.270 - 4.200 uIU/mL   T4, Free   Result Value Ref Range    T4 Free 1.54 0.93 - 1.70 ng/dL   Troponin   Result Value Ref Range    Troponin <0.01 0.00 - 0.03 ng/mL   Magnesium   Result Value Ref Range    Magnesium 1.8 1.6 - 2.6 mg/dL   EKG 12 Lead   Result Value Ref Range    Ventricular Rate 100 BPM    Atrial Rate 100 BPM    P-R Interval 142 ms    QRS Duration 84 ms    Q-T Interval 402 ms    QTc Calculation (Bazett) 518 ms    P Axis 65 degrees    R Axis 83 degrees    T Axis 55 degrees       RADIOLOGY:  CT ABDOMEN PELVIS W IV CONTRAST Additional Contrast? None   Final Result      1. Redemonstration of two medially directed duodenal diverticula   measuring up to 1.6 x 1 cm. 2. Diverticulosis without evidence of acute diverticulitis.       3. Exophytic lesion is seen in the inferior pole of the right kidney   which is similar compared

## 2020-06-20 PROBLEM — R11.2 INTRACTABLE NAUSEA AND VOMITING: Status: RESOLVED | Noted: 2020-05-09 | Resolved: 2020-06-20

## 2020-06-20 PROBLEM — R11.2 INTRACTABLE VOMITING WITH NAUSEA: Status: RESOLVED | Noted: 2020-05-09 | Resolved: 2020-06-20

## 2020-06-20 PROBLEM — K29.70 GASTRITIS: Status: ACTIVE | Noted: 2020-06-20

## 2020-06-20 PROBLEM — R11.15 EMESIS, PERSISTENT: Status: RESOLVED | Noted: 2020-05-15 | Resolved: 2020-06-20

## 2020-06-20 PROBLEM — R11.2 INTRACTABLE NAUSEA AND VOMITING: Status: ACTIVE | Noted: 2020-06-20

## 2020-06-20 PROBLEM — R11.2 INTRACTABLE NAUSEA AND VOMITING: Status: RESOLVED | Noted: 2020-06-20 | Resolved: 2020-06-20

## 2020-06-20 PROBLEM — E53.8 B12 DEFICIENCY: Status: RESOLVED | Noted: 2020-05-10 | Resolved: 2020-06-20

## 2020-06-20 LAB
ALBUMIN SERPL-MCNC: 3.5 G/DL (ref 3.5–5.2)
ALP BLD-CCNC: 52 U/L (ref 35–104)
ALT SERPL-CCNC: 9 U/L (ref 0–32)
ANION GAP SERPL CALCULATED.3IONS-SCNC: 7 MMOL/L (ref 7–16)
AST SERPL-CCNC: 12 U/L (ref 0–31)
BASOPHILS ABSOLUTE: 0.03 E9/L (ref 0–0.2)
BASOPHILS RELATIVE PERCENT: 0.6 % (ref 0–2)
BILIRUB SERPL-MCNC: 0.6 MG/DL (ref 0–1.2)
BUN BLDV-MCNC: 6 MG/DL (ref 8–23)
CALCIUM SERPL-MCNC: 8.9 MG/DL (ref 8.6–10.2)
CHLORIDE BLD-SCNC: 112 MMOL/L (ref 98–107)
CO2: 22 MMOL/L (ref 22–29)
CREAT SERPL-MCNC: 0.7 MG/DL (ref 0.5–1)
EKG ATRIAL RATE: 100 BPM
EKG P AXIS: 65 DEGREES
EKG P-R INTERVAL: 142 MS
EKG Q-T INTERVAL: 402 MS
EKG QRS DURATION: 84 MS
EKG QTC CALCULATION (BAZETT): 518 MS
EKG R AXIS: 83 DEGREES
EKG T AXIS: 55 DEGREES
EKG VENTRICULAR RATE: 100 BPM
EOSINOPHILS ABSOLUTE: 0.06 E9/L (ref 0.05–0.5)
EOSINOPHILS RELATIVE PERCENT: 1.2 % (ref 0–6)
GFR AFRICAN AMERICAN: >60
GFR NON-AFRICAN AMERICAN: >60 ML/MIN/1.73
GLUCOSE BLD-MCNC: 88 MG/DL (ref 74–99)
HCT VFR BLD CALC: 37.8 % (ref 34–48)
HEMOGLOBIN: 12.6 G/DL (ref 11.5–15.5)
IMMATURE GRANULOCYTES #: 0 E9/L
IMMATURE GRANULOCYTES %: 0 % (ref 0–5)
LYMPHOCYTES ABSOLUTE: 2.2 E9/L (ref 1.5–4)
LYMPHOCYTES RELATIVE PERCENT: 43.7 % (ref 20–42)
MAGNESIUM: 1.8 MG/DL (ref 1.6–2.6)
MCH RBC QN AUTO: 31 PG (ref 26–35)
MCHC RBC AUTO-ENTMCNC: 33.3 % (ref 32–34.5)
MCV RBC AUTO: 93.1 FL (ref 80–99.9)
MONOCYTES ABSOLUTE: 0.4 E9/L (ref 0.1–0.95)
MONOCYTES RELATIVE PERCENT: 7.9 % (ref 2–12)
NEUTROPHILS ABSOLUTE: 2.35 E9/L (ref 1.8–7.3)
NEUTROPHILS RELATIVE PERCENT: 46.6 % (ref 43–80)
PDW BLD-RTO: 12.7 FL (ref 11.5–15)
PLATELET # BLD: 205 E9/L (ref 130–450)
PMV BLD AUTO: 9.6 FL (ref 7–12)
POTASSIUM REFLEX MAGNESIUM: 3.3 MMOL/L (ref 3.5–5)
RBC # BLD: 4.06 E12/L (ref 3.5–5.5)
SODIUM BLD-SCNC: 141 MMOL/L (ref 132–146)
TOTAL PROTEIN: 5.4 G/DL (ref 6.4–8.3)
WBC # BLD: 5 E9/L (ref 4.5–11.5)

## 2020-06-20 PROCEDURE — 36415 COLL VENOUS BLD VENIPUNCTURE: CPT

## 2020-06-20 PROCEDURE — 80053 COMPREHEN METABOLIC PANEL: CPT

## 2020-06-20 PROCEDURE — 93010 ELECTROCARDIOGRAM REPORT: CPT | Performed by: INTERNAL MEDICINE

## 2020-06-20 PROCEDURE — 6360000002 HC RX W HCPCS: Performed by: INTERNAL MEDICINE

## 2020-06-20 PROCEDURE — 2580000003 HC RX 258: Performed by: INTERNAL MEDICINE

## 2020-06-20 PROCEDURE — 6370000000 HC RX 637 (ALT 250 FOR IP): Performed by: INTERNAL MEDICINE

## 2020-06-20 PROCEDURE — 83735 ASSAY OF MAGNESIUM: CPT

## 2020-06-20 PROCEDURE — 1200000000 HC SEMI PRIVATE

## 2020-06-20 PROCEDURE — 85025 COMPLETE CBC W/AUTO DIFF WBC: CPT

## 2020-06-20 RX ORDER — MAGNESIUM SULFATE IN WATER 40 MG/ML
2 INJECTION, SOLUTION INTRAVENOUS ONCE
Status: COMPLETED | OUTPATIENT
Start: 2020-06-20 | End: 2020-06-20

## 2020-06-20 RX ORDER — FLUTICASONE PROPIONATE 50 MCG
1 SPRAY, SUSPENSION (ML) NASAL DAILY
Status: DISCONTINUED | OUTPATIENT
Start: 2020-06-20 | End: 2020-06-22 | Stop reason: HOSPADM

## 2020-06-20 RX ORDER — POTASSIUM CHLORIDE 20 MEQ/1
40 TABLET, EXTENDED RELEASE ORAL ONCE
Status: COMPLETED | OUTPATIENT
Start: 2020-06-20 | End: 2020-06-20

## 2020-06-20 RX ADMIN — PANTOPRAZOLE SODIUM 40 MG: 40 TABLET, DELAYED RELEASE ORAL at 15:30

## 2020-06-20 RX ADMIN — LORAZEPAM 1 MG: 2 INJECTION INTRAMUSCULAR; INTRAVENOUS at 10:53

## 2020-06-20 RX ADMIN — CETIRIZINE HYDROCHLORIDE 10 MG: 10 TABLET, FILM COATED ORAL at 10:38

## 2020-06-20 RX ADMIN — POLYETHYLENE GLYCOL 3350 17 G: 17 POWDER, FOR SOLUTION ORAL at 13:46

## 2020-06-20 RX ADMIN — METOCLOPRAMIDE 5 MG: 10 TABLET ORAL at 01:23

## 2020-06-20 RX ADMIN — SUCRALFATE 1 G: 1 TABLET ORAL at 21:25

## 2020-06-20 RX ADMIN — PANTOPRAZOLE SODIUM 40 MG: 40 TABLET, DELAYED RELEASE ORAL at 08:24

## 2020-06-20 RX ADMIN — TRIMETHOBENZAMIDE HYDROCHLORIDE 200 MG: 100 INJECTION INTRAMUSCULAR at 18:02

## 2020-06-20 RX ADMIN — SODIUM CHLORIDE, PRESERVATIVE FREE 10 ML: 5 INJECTION INTRAVENOUS at 10:57

## 2020-06-20 RX ADMIN — FLUTICASONE PROPIONATE 1 SPRAY: 50 SPRAY, METERED NASAL at 10:39

## 2020-06-20 RX ADMIN — POTASSIUM CHLORIDE 40 MEQ: 20 TABLET, EXTENDED RELEASE ORAL at 08:24

## 2020-06-20 RX ADMIN — METOCLOPRAMIDE 5 MG: 10 TABLET ORAL at 21:25

## 2020-06-20 RX ADMIN — SODIUM CHLORIDE, PRESERVATIVE FREE 10 ML: 5 INJECTION INTRAVENOUS at 10:53

## 2020-06-20 RX ADMIN — SUCRALFATE 1 G: 1 TABLET ORAL at 01:23

## 2020-06-20 RX ADMIN — SUCRALFATE 1 G: 1 TABLET ORAL at 10:38

## 2020-06-20 RX ADMIN — SODIUM CHLORIDE, PRESERVATIVE FREE 10 ML: 5 INJECTION INTRAVENOUS at 00:00

## 2020-06-20 RX ADMIN — MAGNESIUM SULFATE HEPTAHYDRATE 2 G: 40 INJECTION, SOLUTION INTRAVENOUS at 08:24

## 2020-06-20 RX ADMIN — SODIUM CHLORIDE, PRESERVATIVE FREE 10 ML: 5 INJECTION INTRAVENOUS at 21:25

## 2020-06-20 ASSESSMENT — PAIN SCALES - GENERAL: PAINLEVEL_OUTOF10: 0

## 2020-06-20 NOTE — CONSULTS
Consultation    Patient's Name/Date of Birth: Honorio Cheema / 1955    Date: June 20, 2020     PCP: Юлия Ritter III,      Reason for consultation:    Nausea and vomiting    History of Present Illness: The patient is a 61-year-old white female who underwent a robotic assisted laparoscopic cholecystectomy on 5/12/2020 for symptomatic cholelithiasis. Overall, the patient did quite well. The patient developed nausea and vomiting over the last 1 week. The patient denies any abdominal pain. The patient attributes her nausea and vomiting to sinus drainage. The patient denies a change in the color of her urine or stool. Past Medical History:   Diagnosis Date    Allergic rhinitis     Anxiety     Asymptomatic gallstones 2020    B12 deficiency 5/10/2020    Hemangioma of liver     Hernia, hiatal     Intractable vomiting with nausea 5/9/2020    Kidney stone     Renal mass, right     Exophytic 11 x 6 mm    Skin cancer (melanoma) ([facility] [address] 06/2019    sugically removed from right upper arm      Past Surgical History:   Procedure Laterality Date    CHOLECYSTECTOMY, LAPAROSCOPIC N/A 5/12/2020    LAPAROSCOPIC ROBOTIC ASSISTED CHOLECYSTECTOMY performed by Luis Antonio Lo MD at [facility] COLONOSCOPY  2017    Negative findings    CYST REMOVAL      fatty cyst removed from back    HYSTERECTOMY      patient states only ovary was removed on right   [address] [place] [address] ENDOSCOPY  01/2020    Hiatal hernia      Allergies: Patient has no known allergies.      Current Facility-Administered Medications   Medication Dose Route Frequency Provider Last Rate Last Dose    trimethobenzamide (TIGAN) injection 200 mg  200 mg Intramuscular Q6H PRN Blank Demarco MD   200 mg at 06/19/20 1726    cetirizine (ZYRTEC) tablet 10 mg  10 mg Oral Daily Blank Demarco MD        ibuprofen (ADVIL;MOTRIN) tablet 400 mg  400 mg Oral Q6H PRN Blank Demarco MD        metoclopramide (REGLAN) tablet 5 mg  5

## 2020-06-20 NOTE — PROGRESS NOTES
Called Patient's daughter Nicolas Mccall and updated her on the GI bleed and possible scope this morning. Daughter aware and in agreement with plan of care. Dr. Hazel Castillo updated on last nights events and plan is for scope this morning. Patient has been NPO since midnight. Nausea and vomiting has subsided since Protonix gtt and Tigan injection.

## 2020-06-20 NOTE — H&P
disintegrating tablet, Take 1 tablet by mouth 3 times daily as needed for Nausea or Vomiting  metoclopramide (REGLAN) 5 MG tablet, Take 1 tablet by mouth nightly  pantoprazole (PROTONIX) 40 MG tablet, Take 1 tablet by mouth 2 times daily (before meals)  Cyanocobalamin 2500 MCG SUBL, Place 1 tablet under the tongue daily DAILY FOR 1 MONTH  LORazepam (ATIVAN) 0.5 MG tablet, Take 0.5 mg by mouth 2 times daily. cetirizine (ZYRTEC) 10 MG tablet, Take 10 mg by mouth daily  sucralfate (CARAFATE) 1 GM tablet, Take 1 tablet by mouth every 12 hours  ibuprofen (ADVIL;MOTRIN) 400 MG tablet, Take 1 tablet by mouth every 6 hours as needed for Pain  sodium chloride (OCEAN, BABY AYR) 0.65 % nasal spray, 1 spray by Nasal route as needed for Congestion  triamcinolone (ARISTOCORT) 0.5 % cream, Apply topically 2 times daily as needed    Allergies:    Patient has no known allergies. Social History:    reports that she has never smoked. She has never used smokeless tobacco. She reports that she does not drink alcohol or use drugs. Family History:   family history includes Cancer in her father; High Cholesterol in her sister; Other in her mother. REVIEW OF SYSTEMS:  As above in the HPI, otherwise negative    PHYSICAL EXAM:    Vitals:  /65   Pulse 87   Temp 98.6 °F (37 °C) (Oral)   Resp 16   Ht 5' 2\" (1.575 m)   Wt 141 lb 6.4 oz (64.1 kg)   SpO2 99%   BMI 25.86 kg/m²     General:  Awake, alert, oriented X 3. Well developed, well nourished, well groomed. No apparent distress. HEENT:  Normocephalic, atraumatic. Pupils equal, round, reactive to light. No scleral icterus. No conjunctival injection. Normal lips, teeth, and gums. No nasal discharge. Neck:  Supple  Heart:  RRR, no murmurs, gallops, rubs  Lungs:  CTA bilaterally, bilat symmetrical expansion, no wheeze, rales, or rhonchi  Abdomen:   Bowel sounds present, soft, nontender, no masses, no organomegaly, no peritoneal signs  Extremities:  No clubbing, cyanosis, or edema  Skin:  Warm and dry, no open lesions or rash  Neuro:  Cranial nerves 2-12 intact, no focal deficits  Breast: deferred  Rectal: deferred  Genitalia:  deferred    LABS:    CBC with Differential:    Lab Results   Component Value Date    WBC 5.0 06/20/2020    RBC 4.06 06/20/2020    HGB 12.6 06/20/2020    HCT 37.8 06/20/2020     06/20/2020    MCV 93.1 06/20/2020    MCH 31.0 06/20/2020    MCHC 33.3 06/20/2020    RDW 12.7 06/20/2020    LYMPHOPCT 43.7 06/20/2020    MONOPCT 7.9 06/20/2020    BASOPCT 0.6 06/20/2020    MONOSABS 0.40 06/20/2020    LYMPHSABS 2.20 06/20/2020    EOSABS 0.06 06/20/2020    BASOSABS 0.03 06/20/2020     CMP:    Lab Results   Component Value Date     06/20/2020    K 3.3 06/20/2020     06/20/2020    CO2 22 06/20/2020    BUN 6 06/20/2020    CREATININE 0.7 06/20/2020    GFRAA >60 06/20/2020    LABGLOM >60 06/20/2020    GLUCOSE 88 06/20/2020    PROT 5.4 06/20/2020    LABALBU 3.5 06/20/2020    CALCIUM 8.9 06/20/2020    BILITOT 0.6 06/20/2020    ALKPHOS 52 06/20/2020    AST 12 06/20/2020    ALT 9 06/20/2020     BMP:    Lab Results   Component Value Date     06/20/2020    K 3.3 06/20/2020     06/20/2020    CO2 22 06/20/2020    BUN 6 06/20/2020    LABALBU 3.5 06/20/2020    CREATININE 0.7 06/20/2020    CALCIUM 8.9 06/20/2020    GFRAA >60 06/20/2020    LABGLOM >60 06/20/2020    GLUCOSE 88 06/20/2020     Magnesium:    Lab Results   Component Value Date    MG 1.8 06/20/2020     Phosphorus:    Lab Results   Component Value Date    PHOS 3.4 05/15/2020     PT/INR:  No results found for: PROTIME, INR  PTT:  No results found for: APTT, PTT[APTT}  Troponin:    Lab Results   Component Value Date    TROPONINI <0.01 06/19/2020     Last 3 Troponin:    Lab Results   Component Value Date    TROPONINI <0.01 06/19/2020    TROPONINI <0.01 06/19/2020    TROPONINI <0.01 05/08/2020     U/A:    Lab Results   Component Value Date    COLORU Yellow 05/09/2020    PROTEINU Negative

## 2020-06-21 PROCEDURE — 2580000003 HC RX 258: Performed by: INTERNAL MEDICINE

## 2020-06-21 PROCEDURE — 6370000000 HC RX 637 (ALT 250 FOR IP): Performed by: SURGERY

## 2020-06-21 PROCEDURE — 1200000000 HC SEMI PRIVATE

## 2020-06-21 PROCEDURE — 6370000000 HC RX 637 (ALT 250 FOR IP): Performed by: INTERNAL MEDICINE

## 2020-06-21 PROCEDURE — 6360000002 HC RX W HCPCS: Performed by: INTERNAL MEDICINE

## 2020-06-21 RX ORDER — BISACODYL 10 MG
10 SUPPOSITORY, RECTAL RECTAL DAILY
Status: DISCONTINUED | OUTPATIENT
Start: 2020-06-21 | End: 2020-06-22 | Stop reason: HOSPADM

## 2020-06-21 RX ADMIN — BISACODYL 10 MG: 10 SUPPOSITORY RECTAL at 09:15

## 2020-06-21 RX ADMIN — SUCRALFATE 1 G: 1 TABLET ORAL at 09:15

## 2020-06-21 RX ADMIN — PANTOPRAZOLE SODIUM 40 MG: 40 TABLET, DELAYED RELEASE ORAL at 06:56

## 2020-06-21 RX ADMIN — SODIUM CHLORIDE, PRESERVATIVE FREE 10 ML: 5 INJECTION INTRAVENOUS at 22:54

## 2020-06-21 RX ADMIN — METOCLOPRAMIDE 5 MG: 10 TABLET ORAL at 22:49

## 2020-06-21 RX ADMIN — CETIRIZINE HYDROCHLORIDE 10 MG: 10 TABLET, FILM COATED ORAL at 09:15

## 2020-06-21 RX ADMIN — SODIUM CHLORIDE, PRESERVATIVE FREE 10 ML: 5 INJECTION INTRAVENOUS at 09:16

## 2020-06-21 RX ADMIN — LORAZEPAM 1 MG: 2 INJECTION INTRAMUSCULAR; INTRAVENOUS at 22:44

## 2020-06-21 RX ADMIN — PANTOPRAZOLE SODIUM 40 MG: 40 TABLET, DELAYED RELEASE ORAL at 17:01

## 2020-06-21 RX ADMIN — LORAZEPAM 1 MG: 2 INJECTION INTRAMUSCULAR; INTRAVENOUS at 15:02

## 2020-06-21 RX ADMIN — SUCRALFATE 1 G: 1 TABLET ORAL at 22:49

## 2020-06-21 ASSESSMENT — PAIN SCALES - GENERAL
PAINLEVEL_OUTOF10: 0
PAINLEVEL_OUTOF10: 0

## 2020-06-21 NOTE — CONSULTS
[id number] [address]                                  CONSULTATION    PATIENT NAME: Juan Pena                      :        1955  MED REC NO:   [id number]                            ROOM:       8018  ACCOUNT NO:   [de-identified]                           ADMIT DATE: 2020  PROVIDER:     Loki Lott MD    CONSULT DATE:  2020    CHIEF COMPLAINT:  Right renal mass. PRESENT ILLNESS:  This 21-year-old female, who was admitted to the  hospital because of gastrointestinal distress, underwent a CAT scan,  which revealed a cystic lesion in her left kidney, but on the lower pole  of the right kidney there was an indeterminate mass. In 2019, the patient also was found to have the identical lesion on  the lower pole of her right kidney. She was seen by Dr. Spencer George who  referred her to Dr. Emily Renteria in [facility] [place], who recommended a  radiofrequency ablation of the mass; however, because of the pandemic,  this was canceled. She has been in contact with the clinic to have this  procedure done and it has been requested since there has been a slight  delay to have an MRI done to see if there has been any significant  change. The patient also is known to have a hemangioma of her liver and  her gastroenterologist suggested that she have an MRI of her abdomen to  evaluate that lesion. Currently, the patient is in the hospital.  She is feeling some better. She is terribly disturbed with anxiety because of the pandemic and she  also requested to be seen by a mental health specialist because of her  anxiety, which is becoming somewhat uncontrollable. PAST MEDICAL HISTORY:  Significant for allergic rhinitis, anxiety, the  above-mentioned renal lesion, hemangioma of the liver, and a history of  skin cancer, melanoma.     PAST SURGICAL HISTORY:  She has had tubal ligation, colonoscopy,  cholecystectomy in 05/2020. SOCIAL HISTORY:  She is not a smoker. She does not use alcohol. LIVES IN [place] ON [address]. DAUGHTER LIVES [address]CROSS [address].  VERY ANXIOUS RE: CORONAVIRUS. PHYSICAL EXAMINATION:  The patient is somewhat weepy, although she  appears to be in control of her emotions. She does not appear to be in  any discomfort. IMPRESSION:  Right indeterminate lower pole renal mass, which is being  observed and followed by the [facility].  She was requested to  have an MRI and it was ordered at this time to see if it can be  accomplished. The MRI will also evaluate her hemangioma. The patient  again has requested a mental health consult.         Jason Beaulieu MD    D: 06/21/2020 9:51:18       T: 06/21/2020 9:53:41     [id number]  Job#: [id number]     Doc#: [id number]    CC:

## 2020-06-21 NOTE — PROGRESS NOTES
Subjective: The patient is awake and alert. No problems overnight. Denies chest pain, angina, and dyspnea. Denies abdominal pain. Tolerating diet. No nausea or vomiting. Objective:    /78   Pulse 97   Temp 98.4 °F (36.9 °C) (Oral)   Resp 18   Ht 5' 2\" (1.575 m)   Wt 141 lb 6.4 oz (64.1 kg)   SpO2 96%   BMI 25.86 kg/m²     Current medications that patient is taking have been reviewed. Heart:  RRR, no murmurs, gallops, or rubs.   Lungs:  CTA bilaterally, no wheeze, rales or rhonchi  Abd: bowel sounds present, soft, nontender, nondistended, no masses  Extrem:  No cyanosis or edema    CBC with Differential:    Lab Results   Component Value Date    WBC 5.0 06/20/2020    RBC 4.06 06/20/2020    HGB 12.6 06/20/2020    HCT 37.8 06/20/2020     06/20/2020    MCV 93.1 06/20/2020    MCH 31.0 06/20/2020    MCHC 33.3 06/20/2020    RDW 12.7 06/20/2020    LYMPHOPCT 43.7 06/20/2020    MONOPCT 7.9 06/20/2020    BASOPCT 0.6 06/20/2020    MONOSABS 0.40 06/20/2020    LYMPHSABS 2.20 06/20/2020    EOSABS 0.06 06/20/2020    BASOSABS 0.03 06/20/2020     CMP:    Lab Results   Component Value Date     06/20/2020    K 3.3 06/20/2020     06/20/2020    CO2 22 06/20/2020    BUN 6 06/20/2020    CREATININE 0.7 06/20/2020    GFRAA >60 06/20/2020    LABGLOM >60 06/20/2020    GLUCOSE 88 06/20/2020    PROT 5.4 06/20/2020    LABALBU 3.5 06/20/2020    CALCIUM 8.9 06/20/2020    BILITOT 0.6 06/20/2020    ALKPHOS 52 06/20/2020    AST 12 06/20/2020    ALT 9 06/20/2020     BMP:    Lab Results   Component Value Date     06/20/2020    K 3.3 06/20/2020     06/20/2020    CO2 22 06/20/2020    BUN 6 06/20/2020    LABALBU 3.5 06/20/2020    CREATININE 0.7 06/20/2020    CALCIUM 8.9 06/20/2020    GFRAA >60 06/20/2020    LABGLOM >60 06/20/2020    GLUCOSE 88 06/20/2020     Magnesium:    Lab Results   Component Value Date    MG 1.8 06/20/2020     Phosphorus:    Lab Results   Component Value Date    PHOS 3.4

## 2020-06-21 NOTE — PROGRESS NOTES
Department of Surgery - Adult  General Surgery  Dr. Linda Poon's Progress Note      SUBJECTIVE: The patient states that her nausea has improved. OBJECTIVE      Physical    VITALS:  /78   Pulse 98   Temp 98.4 °F (36.9 °C) (Oral)   Resp 18   Ht 5' 2\" (1.575 m)   Wt 141 lb 6.4 oz (64.1 kg)   SpO2 96%   BMI 25.86 kg/m²   INTAKE/OUTPUT:      Intake/Output Summary (Last 24 hours) at 2020  Last data filed at 2020  Gross per 24 hour   Intake --   Output 950 ml   Net -950 ml     TEMPERATURE:  Current - Temp: 98.4 °F (36.9 °C); Max - Temp  Av.5 °F (36.9 °C)  Min: 98.4 °F (36.9 °C)  Max: 98.6 °F (37 °C)  RESPIRATIONS RANGE: Resp  Av  Min: 16  Max: 18  PULSE RANGE: Pulse  Av.5  Min: 87  Max: 98  BLOOD PRESSURE RANGE:  Systolic (36PCG), HZN:062 , Min:123 , WOS:443   ; Diastolic (83QXN), FUP:07, Min:65, Max:78    PULSE OXIMETRY RANGE: SpO2  Av %  Min: 96 %  Max: 99 %  CONSTITUTIONAL:  awake, alert, cooperative, no apparent distress, and appears stated age  LUNGS:  No increased work of breathing, good air exchange, clear to auscultation bilaterally, no crackles or wheezing  CARDIOVASCULAR:  regular rate and rhythm and no murmur noted  ABDOMEN: The abdomen is soft non-distended and nontender.   Data  CBC with Differential:    Lab Results   Component Value Date    WBC 5.0 2020    RBC 4.06 2020    HGB 12.6 2020    HCT 37.8 2020     2020    MCV 93.1 2020    MCH 31.0 2020    MCHC 33.3 2020    RDW 12.7 2020    LYMPHOPCT 43.7 2020    MONOPCT 7.9 2020    BASOPCT 0.6 2020    MONOSABS 0.40 2020    LYMPHSABS 2.20 2020    EOSABS 0.06 2020    BASOSABS 0.03 2020     CMP:    Lab Results   Component Value Date     2020    K 3.3 2020     2020    CO2 22 2020    BUN 6 2020    CREATININE 0.7 2020    GFRAA >60 2020    LABGLOM >60 2020 GLUCOSE 88 06/20/2020    PROT 5.4 06/20/2020    LABALBU 3.5 06/20/2020    CALCIUM 8.9 06/20/2020    BILITOT 0.6 06/20/2020    ALKPHOS 52 06/20/2020    AST 12 06/20/2020    ALT 9 06/20/2020     BMP:  Hepatic Function Panel:  Ionized Calcium:  No results found for: IONCA  Magnesium:    Lab Results   Component Value Date    MG 1.8 06/20/2020     Phosphorus:    Lab Results   Component Value Date    PHOS 3.4 05/15/2020       Current Inpatient Medications    Current Facility-Administered Medications: fluticasone (FLONASE) 50 MCG/ACT nasal spray 1 spray, 1 spray, Each Nostril, Daily  trimethobenzamide (TIGAN) injection 200 mg, 200 mg, Intramuscular, Q6H PRN  cetirizine (ZYRTEC) tablet 10 mg, 10 mg, Oral, Daily  ibuprofen (ADVIL;MOTRIN) tablet 400 mg, 400 mg, Oral, Q6H PRN  metoclopramide (REGLAN) tablet 5 mg, 5 mg, Oral, Nightly  sucralfate (CARAFATE) tablet 1 g, 1 g, Oral, 2 times per day  pantoprazole (PROTONIX) tablet 40 mg, 40 mg, Oral, BID AC  LORazepam (ATIVAN) injection 1 mg, 1 mg, Intravenous, Q6H PRN  sodium chloride flush 0.9 % injection 10 mL, 10 mL, Intravenous, 2 times per day  sodium chloride flush 0.9 % injection 10 mL, 10 mL, Intravenous, PRN  acetaminophen (TYLENOL) tablet 650 mg, 650 mg, Oral, Q6H PRN **OR** acetaminophen (TYLENOL) suppository 650 mg, 650 mg, Rectal, Q6H PRN  polyethylene glycol (GLYCOLAX) packet 17 g, 17 g, Oral, Daily PRN  enoxaparin (LOVENOX) injection 40 mg, 40 mg, Subcutaneous, Daily    ASSESSMENT:    63 y.o. female with improved nausea without a definitive source  Constipation    PLAN:  The patient will receive a suppository to help move her bowels.   Jim Duran 6/21/20207:05 AM

## 2020-06-21 NOTE — PROGRESS NOTES
Pt requested that B12 and Vit D levels be checked while inpatient status. Also requesting psych evaluation d/t extreme anxiety. Dr. Yesy Berman notified of request for psych eval earlier today.

## 2020-06-21 NOTE — PLAN OF CARE
Problem: Nutritional:  Goal: Maintenance of adequate nutrition will improve  Description: Maintenance of adequate nutrition will improve  Outcome: Met This Shift     Problem: Infection:  Goal: Will remain free from infection  Description: Will remain free from infection  Outcome: Met This Shift     Problem: Pain:  Goal: Patient's pain/discomfort is manageable  Description: Patient's pain/discomfort is manageable  Outcome: Met This Shift     Problem: Nausea/Vomiting:  Goal: Absence of nausea/vomiting  Description: Absence of nausea/vomiting  Outcome: Met This Shift

## 2020-06-22 ENCOUNTER — APPOINTMENT (OUTPATIENT)
Dept: MRI IMAGING | Age: 65
DRG: 241 | End: 2020-06-22
Payer: COMMERCIAL

## 2020-06-22 VITALS
WEIGHT: 141.4 LBS | HEART RATE: 90 BPM | TEMPERATURE: 98.3 F | HEIGHT: 62 IN | DIASTOLIC BLOOD PRESSURE: 74 MMHG | SYSTOLIC BLOOD PRESSURE: 116 MMHG | BODY MASS INDEX: 26.02 KG/M2 | RESPIRATION RATE: 18 BRPM | OXYGEN SATURATION: 98 %

## 2020-06-22 LAB
ALBUMIN SERPL-MCNC: 4.1 G/DL (ref 3.5–5.2)
ALP BLD-CCNC: 67 U/L (ref 35–104)
ALT SERPL-CCNC: 12 U/L (ref 0–32)
AST SERPL-CCNC: 14 U/L (ref 0–31)
BILIRUB SERPL-MCNC: 0.2 MG/DL (ref 0–1.2)
BILIRUBIN DIRECT: <0.2 MG/DL (ref 0–0.3)
BILIRUBIN, INDIRECT: NORMAL MG/DL (ref 0–1)
CEA: 0.5 NG/ML (ref 0–5.2)
TOTAL PROTEIN: 6.7 G/DL (ref 6.4–8.3)

## 2020-06-22 PROCEDURE — 6360000002 HC RX W HCPCS: Performed by: INTERNAL MEDICINE

## 2020-06-22 PROCEDURE — 86301 IMMUNOASSAY TUMOR CA 19-9: CPT

## 2020-06-22 PROCEDURE — 80076 HEPATIC FUNCTION PANEL: CPT

## 2020-06-22 PROCEDURE — 2580000003 HC RX 258: Performed by: INTERNAL MEDICINE

## 2020-06-22 PROCEDURE — 36415 COLL VENOUS BLD VENIPUNCTURE: CPT

## 2020-06-22 PROCEDURE — 6360000004 HC RX CONTRAST MEDICATION: Performed by: RADIOLOGY

## 2020-06-22 PROCEDURE — 86304 IMMUNOASSAY TUMOR CA 125: CPT

## 2020-06-22 PROCEDURE — A9581 GADOXETATE DISODIUM INJ: HCPCS | Performed by: RADIOLOGY

## 2020-06-22 PROCEDURE — 74183 MRI ABD W/O CNTR FLWD CNTR: CPT

## 2020-06-22 PROCEDURE — 82378 CARCINOEMBRYONIC ANTIGEN: CPT

## 2020-06-22 PROCEDURE — 82105 ALPHA-FETOPROTEIN SERUM: CPT

## 2020-06-22 PROCEDURE — 99254 IP/OBS CNSLTJ NEW/EST MOD 60: CPT | Performed by: TRANSPLANT SURGERY

## 2020-06-22 PROCEDURE — 6370000000 HC RX 637 (ALT 250 FOR IP): Performed by: INTERNAL MEDICINE

## 2020-06-22 RX ADMIN — PANTOPRAZOLE SODIUM 40 MG: 40 TABLET, DELAYED RELEASE ORAL at 17:56

## 2020-06-22 RX ADMIN — PANTOPRAZOLE SODIUM 40 MG: 40 TABLET, DELAYED RELEASE ORAL at 06:33

## 2020-06-22 RX ADMIN — CETIRIZINE HYDROCHLORIDE 10 MG: 10 TABLET, FILM COATED ORAL at 10:20

## 2020-06-22 RX ADMIN — GADOXETATE DISODIUM 6 ML: 181.43 INJECTION, SOLUTION INTRAVENOUS at 09:55

## 2020-06-22 RX ADMIN — SODIUM CHLORIDE, PRESERVATIVE FREE 10 ML: 5 INJECTION INTRAVENOUS at 08:32

## 2020-06-22 RX ADMIN — LORAZEPAM 1 MG: 2 INJECTION INTRAMUSCULAR; INTRAVENOUS at 18:58

## 2020-06-22 RX ADMIN — LORAZEPAM 1 MG: 2 INJECTION INTRAMUSCULAR; INTRAVENOUS at 08:27

## 2020-06-22 RX ADMIN — TRIMETHOBENZAMIDE HYDROCHLORIDE 200 MG: 100 INJECTION INTRAMUSCULAR at 18:18

## 2020-06-22 RX ADMIN — SUCRALFATE 1 G: 1 TABLET ORAL at 10:20

## 2020-06-22 ASSESSMENT — PAIN SCALES - GENERAL: PAINLEVEL_OUTOF10: 0

## 2020-06-22 NOTE — CONSULTS
Date    Allergic rhinitis     Anxiety     Asymptomatic gallstones 2020    B12 deficiency 5/10/2020    Hemangioma of liver     Hernia, hiatal     Intractable vomiting with nausea 5/9/2020    Kidney stone     Renal mass, right     Exophytic 11 x 6 mm    Skin cancer (melanoma) ([address] 06/2019    sugically removed from right upper arm       PSHx:  Past Surgical History:   Procedure Laterality Date    CHOLECYSTECTOMY, LAPAROSCOPIC N/A 5/12/2020    LAPAROSCOPIC ROBOTIC ASSISTED CHOLECYSTECTOMY performed by Braydon Simms MD at [facility] COLONOSCOPY  2017    Negative findings    CYST REMOVAL      fatty cyst removed from back    HYSTERECTOMY      patient states only ovary was removed on right   [facility] ENDOSCOPY  01/2020    Hiatal hernia       Meds:  Current Facility-Administered Medications   Medication Dose Route Frequency Provider Last Rate Last Dose    bisacodyl (DULCOLAX) suppository 10 mg  10 mg Rectal Daily Braydon Simms MD   10 mg at 06/21/20 0915    fluticasone (FLONASE) 50 MCG/ACT nasal spray 1 spray  1 spray Each Nostril Daily Sunny Oreilly MD   1 spray at 06/20/20 1039    trimethobenzamide (TIGAN) injection 200 mg  200 mg Intramuscular Q6H PRN Camilla Das MD   200 mg at 06/20/20 1802    cetirizine (ZYRTEC) tablet 10 mg  10 mg Oral Daily Camilla Das MD   10 mg at 06/22/20 1020    ibuprofen (ADVIL;MOTRIN) tablet 400 mg  400 mg Oral Q6H PRN Camilla Das MD        metoclopramide (REGLAN) tablet 5 mg  5 mg Oral Nightly Camilla Das MD   5 mg at 06/21/20 2249    sucralfate (CARAFATE) tablet 1 g  1 g Oral 2 times per day Camilla Das MD   1 g at 06/22/20 1020    pantoprazole (PROTONIX) tablet 40 mg  40 mg Oral BID AC Camilla Das MD   40 mg at 06/22/20 2060    LORazepam (ATIVAN) injection 1 mg  1 mg Intravenous Q6H PRN Camilla Das MD   1 mg at 06/22/20 0827    sodium chloride flush 0.9 % injection 10 mL  10 mL Intravenous 2 times per day alkaline phosphatase, bilirubin  -Follow-up as outpatient    Above has been discussed with the patient and all questions answered to her satisfaction. She is agreeable with the plan as needed. Thank you for the opportunity see this patient in consultation. NOTE:  This report was transcribed using voice recognition software. Every effort was made to ensure accuracy; however, inadvertent computerized transcription errors may be present.     Letha Borjas MD  6/22/2020  2:03 PM

## 2020-06-22 NOTE — PROGRESS NOTES
partner: None     Emotionally abused: None     Physically abused: None     Forced sexual activity: None   Other Topics Concern    None   Social History Narrative    None       Scheduled Meds:   bisacodyl  10 mg Rectal Daily    fluticasone  1 spray Each Nostril Daily    cetirizine  10 mg Oral Daily    metoclopramide  5 mg Oral Nightly    sucralfate  1 g Oral 2 times per day    pantoprazole  40 mg Oral BID AC    sodium chloride flush  10 mL Intravenous 2 times per day    enoxaparin  40 mg Subcutaneous Daily     Continuous Infusions:  PRN Meds:.trimethobenzamide, ibuprofen, LORazepam, sodium chloride flush, acetaminophen **OR** acetaminophen, polyethylene glycol    /74   Pulse 90   Temp 98.3 °F (36.8 °C) (Oral)   Resp 18   Ht 5' 2\" (1.575 m)   Wt 141 lb 6.4 oz (64.1 kg)   SpO2 98%   BMI 25.86 kg/m²     Lab Results   Component Value Date    WBC 5.0 06/20/2020    HGB 12.6 06/20/2020    HCT 37.8 06/20/2020    MCV 93.1 06/20/2020     06/20/2020       Lab Results   Component Value Date    CREATININE 0.7 06/20/2020         Lab Results   Component Value Date    LABURIN Growth not present 05/14/2020       No results found for: BC    No results found for: BLOODCULT2    PHYSICAL EXAMINATION:  Skin dry, without rashes  Respirations non-labored, intact  Abdomen mildly obese  Alert and oriented x3          ASSESSMENT AND PLAN:  1. Right lower pole renal mass. Initially diagnosed by Dr. Griffin Krabbe and was scheduled to have a radiofrequency ablation procedure [facility] by Dr. Letty Brantley. However this was to occur in April and during the coronavirus outbreak. She canceled this due to anxiety associated with a virus. She had repeat imaging showing above mass which is 1.3 cm in the right lower pole of her kidney.     I discussed with Inessa Cueto that if I was seeing her for the first time and found this mass that I would recommend observation until she is confident in coming to the hospital for procedure after the coronavirus pandemic. It would be my recommendation however, to treat this with robotic partial nephrectomy as I feel this would give her a better long-term outcome than percutaneous radiofrequency ablation. She agreed with this at the moment. My biggest concern for her is her anxiety over the virus and that it is perfectly reasonable to observe this for 3 months. She will have an enhanced renal ultrasound in 3 months and follow-up in the office at that time to reevaluate.         Livan Castillo M.D.  6/22/2020  5:33 PM

## 2020-06-22 NOTE — CONSULTS
Hepatobiliary and Pancreatic Surgery Attending History and Physical    Patient's Name/Date of Birth: Italia Becerra /1955 (86 y.o.)    Date: June 22, 2020     CC:possible pancreatic head mass    HPI:  Patient is a very pleasant and anxious 63year old female whom presented to the hospital with nausea and emesis. She underwent a cholecystectomy with Dr. Pearl Liao. She does have a hiatal hernia. 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 She then underwent a MRI which states pancreatic head mass. She denies any weight loss.       Past Medical History:   Diagnosis Date    Allergic rhinitis     Anxiety     Asymptomatic gallstones 2020    B12 deficiency 5/10/2020    Hemangioma of liver     Hernia, hiatal     Intractable vomiting with nausea 5/9/2020    Kidney stone     Renal mass, right     Exophytic 11 x 6 mm    Skin cancer (melanoma) ([facility] [address] 06/2019    sugically removed from right upper arm       Past Surgical History:   Procedure Laterality Date    CHOLECYSTECTOMY, LAPAROSCOPIC N/A 5/12/2020    LAPAROSCOPIC ROBOTIC ASSISTED CHOLECYSTECTOMY performed by Tiffanie Reese MD at [place] [address] COLONOSCOPY  2017    Negative findings    CYST REMOVAL      fatty cyst removed from back    HYSTERECTOMY      patient states only ovary was removed on right   [facility] ENDOSCOPY  01/2020    Hiatal hernia       Current Facility-Administered Medications   Medication Dose Route Frequency Provider Last Rate Last Dose    bisacodyl (DULCOLAX) suppository 10 mg  10 mg Rectal Daily Tiffanie Reese MD   10 mg at 06/21/20 0915    fluticasone (FLONASE) 50 MCG/ACT nasal spray 1 spray  1 spray Each Nostril Daily Justa Wynne MD   1 spray at 06/20/20 1039    trimethobenzamide (TIGAN) injection 200 mg  200 mg Intramuscular Q6H PRN Jayla Bangura MD   200 mg at 06/20/20 1802    cetirizine (ZYRTEC) tablet 10 mg  10 mg Oral Daily Barbara POLLACK

## 2020-06-22 NOTE — PROGRESS NOTES
GENERAL SURGERY  DAILY PROGRESS NOTE  6/22/2020    Chief Complaint   Patient presents with    Chest Pain    Nausea      Subjective:  No events overnight. No pain, nausea, or vomiting. Objective:  /74   Pulse 90   Temp 98.3 °F (36.8 °C) (Oral)   Resp 18   Ht 5' 2\" (1.575 m)   Wt 141 lb 6.4 oz (64.1 kg)   SpO2 98%   BMI 25.86 kg/m²     General appearance: alert, cooperative and in no acute distress.   Eyes: grossly normal  Lungs: nonlabored breathing  Heart: regular rate  Abdomen: soft, non-tender, non distended  Skin: warm, dry    Assessment/Plan:  63 y.o. female 1mo s/p robotic cholecystectomy - improving nausea, constipation    MRCP pending per Urology for evaluation of renal mass and liver lesion  Tolerating diet  Should be ok for discharge today    Electronically signed by Olvin Cordoba MD on 6/22/2020 at 7:58 AM   Agree with the assessment and plan  OK for discharge

## 2020-06-23 LAB — CA 125: 7.6 U/ML (ref 0–35)

## 2020-06-23 NOTE — DISCHARGE SUMMARY
Physician Discharge Summary     Patient ID:  Yakelin Sweeney  [id number]  11 y.o.  1955    Admit date: 6/19/2020    Discharge date and time:  6/22/2020    Admission Diagnoses:   Patient Active Problem List   Diagnosis    History of hiatal hernia    Hemangioma of liver    Anxiety    Gastritis       Discharge Diagnoses: as above    Consults: GI, psychiatry, general surgery, urology and hepatobiliary surgery    Procedures: see chart    Hospital Course: patient was admitted with nausea and vomiting. She was diagnosed with gastritis. She was seen by surgery. The gastritis was felt to be related to anxiety. No clear etiology was found. She was seen by general surgery, GI, hepatobiliary and urology. She had CT's and MRI's of her abdomen. Chronic hepatic hemangiomas and renal cysts were seen. MRI was suspicious for pancreatic mass. However, on further evaluation this was felt to be duodenal diverticula. Her symptoms resolved with conservative therapy. She was discharged in stable condition. Discharge Exam:  See progress note from today    Condition:  stable    Disposition: home    Patient Instructions:   Discharge Medication List as of 6/22/2020  6:55 PM      START taking these medications    Details   trimethobenzamide (TIGAN) 300 MG capsule Take 1 capsule by mouth 3 times daily as needed (nausea), Disp-21 capsule, R-0Normal         CONTINUE these medications which have NOT CHANGED    Details   PARoxetine (PAXIL) 10 MG tablet Take 10 mg by mouth every morning This is a new med that patient has cut down to 5 mg nightly d/t nausea after taking initial doses. Historical Med      polyethylene glycol (GLYCOLAX) 17 g packet Take 17 g by mouth daily as needed for Constipation Patient takes half of this dose nightly. Historical Med      metoclopramide (REGLAN) 5 MG tablet Take 1 tablet by mouth nightly, Disp-30 tablet, R-1Normal      pantoprazole (PROTONIX) 40 MG tablet Take 1 tablet by mouth 2 times daily

## 2020-06-25 LAB
AFP-TUMOR MARKER: 3 NG/ML (ref 0–9)
CA 19-9: 11 U/ML (ref 0–37)

## 2020-06-30 ENCOUNTER — TELEPHONE (OUTPATIENT)
Dept: SURGERY | Age: 65
End: 2020-06-30

## 2020-06-30 ENCOUNTER — INITIAL CONSULT (OUTPATIENT)
Dept: SURGERY | Age: 65
End: 2020-06-30
Payer: COMMERCIAL

## 2020-06-30 PROCEDURE — 99203 OFFICE O/P NEW LOW 30 MIN: CPT | Performed by: SURGERY

## 2020-06-30 NOTE — PROGRESS NOTES
hiatal     Intractable vomiting with nausea 5/9/2020    Kidney stone     Renal mass, right     Exophytic 11 x 6 mm    Skin cancer (melanoma) ([facility] [address] 06/2019    sugically removed from right upper arm       Past Surgical History:   Procedure Laterality Date    CHOLECYSTECTOMY, LAPAROSCOPIC N/A 5/12/2020    LAPAROSCOPIC ROBOTIC ASSISTED CHOLECYSTECTOMY performed by Mark Kuo MD at [facility] COLONOSCOPY  2017    Negative findings    CYST REMOVAL      fatty cyst removed from back    HYSTERECTOMY      patient states only ovary was removed on right   [address] [facility] ENDOSCOPY  01/2020    Hiatal hernia       Current Outpatient Medications   Medication Sig Dispense Refill    PARoxetine (PAXIL) 10 MG tablet Take 10 mg by mouth every morning This is a new med that patient has cut down to 5 mg nightly d/t nausea after taking initial doses.  polyethylene glycol (GLYCOLAX) 17 g packet Take 17 g by mouth daily as needed for Constipation Patient takes half of this dose nightly.  metoclopramide (REGLAN) 5 MG tablet Take 1 tablet by mouth nightly 30 tablet 1    pantoprazole (PROTONIX) 40 MG tablet Take 1 tablet by mouth 2 times daily (before meals) 60 tablet 3    sucralfate (CARAFATE) 1 GM tablet Take 1 tablet by mouth every 12 hours 120 tablet 3    ibuprofen (ADVIL;MOTRIN) 400 MG tablet Take 1 tablet by mouth every 6 hours as needed for Pain 120 tablet 3    sodium chloride (OCEAN, BABY AYR) 0.65 % nasal spray 1 spray by Nasal route as needed for Congestion  0    Cyanocobalamin 2500 MCG SUBL Place 1 tablet under the tongue daily DAILY FOR 1 MONTH 100 tablet 0    triamcinolone (ARISTOCORT) 0.5 % cream Apply topically 2 times daily as needed      LORazepam (ATIVAN) 0.5 MG tablet Take 0.5 mg by mouth 2 times daily.  cetirizine (ZYRTEC) 10 MG tablet Take 10 mg by mouth daily       No current facility-administered medications for this visit.         No Known Allergies    Family History   Problem Relation Age of Onset    Other Mother         Mother  age 80; many prescribed medication addictions    Cancer Father          age 80, lymphoma    High Cholesterol Sister        Social History     Socioeconomic History    Marital status:      Spouse name: Not on file    Number of children: Not on file    Years of education: Not on file    Highest education level: Not on file   Occupational History    Not on file   Social Needs    Financial resource strain: Not on file    Food insecurity     Worry: Not on file     Inability: Not on file    Transportation needs     Medical: Not on file     Non-medical: Not on file   Tobacco Use    Smoking status: Never Smoker    Smokeless tobacco: Never Used   Substance and Sexual Activity    Alcohol use: No    Drug use: No    Sexual activity: Not on file   Lifestyle    Physical activity     Days per week: Not on file     Minutes per session: Not on file    Stress: Not on file   Relationships    Social connections     Talks on phone: Not on file     Gets together: Not on file     Attends Yarsanism service: Not on file     Active member of club or organization: Not on file     Attends meetings of clubs or organizations: Not on file     Relationship status: Not on file    Intimate partner violence     Fear of current or ex partner: Not on file     Emotionally abused: Not on file     Physically abused: Not on file     Forced sexual activity: Not on file   Other Topics Concern    Not on file   Social History Narrative    Not on file          Assessment/Plan:  Honorio Cheema is a 63 y.o. 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 she  agrees to proceed.       2020  12:47 PM

## 2020-06-30 NOTE — TELEPHONE ENCOUNTER
Per the order of Dr. Lorri Connelly, patient has been scheduled for EUS with biopsy  on 7.31.2020. Patient provided with verbal instructions over the phone and informed that she will need to have covid testing done prior to her procedure. Written instructions also mailed to patient. Patient instructed to please contact our office with any questions. Patient will follow up with Dr. Shantel Loredo for results    Call placed to surgery scheduling to schedule procedure. Dr. Lorri Connelly to enter orders. [facility] [place] [facility] [place] and pre cert is not required for CPT [id number].   Electronically signed by Kofi Palma on 6/30/20 at 4:26 PM EDT

## 2020-07-01 ENCOUNTER — TELEPHONE (OUTPATIENT)
Dept: HEMATOLOGY | Age: 65
End: 2020-07-01

## 2020-07-01 NOTE — TELEPHONE ENCOUNTER
I called patient to make her a follow up to review her EUS and she requested a phone call from Dr. Carmen Sutton instead of an office visit. I made her a phone visit on 8/10/20 at 3:00pm. She confirmed this date.     Electronically signed by Kaela Tanner RN on 7/1/2020 at 8:22 AM

## 2020-07-23 RX ORDER — FAMOTIDINE 20 MG/1
20 TABLET, FILM COATED ORAL NIGHTLY
COMMUNITY

## 2020-07-23 RX ORDER — LORATADINE 10 MG/1
10 TABLET ORAL DAILY
COMMUNITY
End: 2021-03-13 | Stop reason: ALTCHOICE

## 2020-07-23 RX ORDER — BUSPIRONE HYDROCHLORIDE 5 MG/1
5 TABLET ORAL 2 TIMES DAILY
COMMUNITY

## 2020-07-23 RX ORDER — CLINDAMYCIN HYDROCHLORIDE 150 MG/1
150 CAPSULE ORAL 3 TIMES DAILY
COMMUNITY
End: 2020-11-03 | Stop reason: ALTCHOICE

## 2020-07-23 NOTE — PROGRESS NOTES
[address] PRE-ADMISSION TESTING ENDOSCOPY INSTRUCTIONS- [facility]-phone number:[phone]    ENDOSCOPY INSTRUCTIONS:   [] Bowel prep instructions reviewed. [x] Nothing by mouth after midnight, including gum, candy, mints, or water. Please follow your surgeons instructions if you are required to complete a bowel prep. Colonoscopy- no solid food-only clear liquids the day prior). [x] You may brush your teeth, gargle, but do NOT swallow water. [x] Do not wear makeup, lotions, powders, deodorant. Nail polish as directed by the nurse. [x] Arrange transportation with a responsible adult  to and from the hospital. If you do not have a responsible adult  to transport you, you will need to make arrangements with a medical transportation company (i.e. [employer]. A Uber/taxi/bus is not appropriate unless you are accompanied by a responsible adult ). Arrange for someone to be with you for the remainder of the day and for 24 hours after your procedure due to having had anesthesia. Who will be your  for transportation?__________daughter________   Who will be staying with you for 24 hrs after your procedure?_____daughter_____________    PARKING INSTRUCTIONS:   [x] Arrival Time:_______07/31/2020  0900_________________  · [] Parking lot  \"I\" OR 1 is located on [facility] (the corner of [facility]). To enter, press the button and the gate will lift. A free token will be provided to exit the lot. One car per patient is allowed to park in this lot. All other cars are to park on [address] [place] [address] either in the parking garage or the handicap lot. [x] To reach the [facility] lobby from [address] [place] [address], upon entering the hospital, take elevator B to the 3rd floor. EDUCATION INSTRUCTIONS:  [x] Bring a complete list of your medications, please write the last time you took the medicine, give this list to the nurse.   [x] Take the following medications

## 2020-07-27 ENCOUNTER — HOSPITAL ENCOUNTER (OUTPATIENT)
Age: 65
Discharge: HOME | End: 2020-07-29
Payer: COMMERCIAL

## 2020-07-27 PROCEDURE — U0003 INFECTIOUS AGENT DETECTION BY NUCLEIC ACID (DNA OR RNA); SEVERE ACUTE RESPIRATORY SYNDROME CORONAVIRUS 2 (SARS-COV-2) (CORONAVIRUS DISEASE [COVID-19]), AMPLIFIED PROBE TECHNIQUE, MAKING USE OF HIGH THROUGHPUT TECHNOLOGIES AS DESCRIBED BY CMS-2020-01-R: HCPCS

## 2020-07-28 LAB
SARS-COV-2: NOT DETECTED
SOURCE: NORMAL

## 2020-07-30 ENCOUNTER — ANESTHESIA EVENT (OUTPATIENT)
Dept: ENDOSCOPY | Age: 65
End: 2020-07-30
Payer: COMMERCIAL

## 2020-07-31 ENCOUNTER — ANESTHESIA (OUTPATIENT)
Dept: ENDOSCOPY | Age: 65
End: 2020-07-31
Payer: COMMERCIAL

## 2020-07-31 ENCOUNTER — HOSPITAL ENCOUNTER (OUTPATIENT)
Age: 65
Setting detail: OUTPATIENT SURGERY
Discharge: HOME | End: 2020-07-31
Attending: SURGERY | Admitting: SURGERY
Payer: COMMERCIAL

## 2020-07-31 VITALS
BODY MASS INDEX: 24.84 KG/M2 | HEART RATE: 71 BPM | RESPIRATION RATE: 14 BRPM | OXYGEN SATURATION: 100 % | HEIGHT: 62 IN | SYSTOLIC BLOOD PRESSURE: 115 MMHG | WEIGHT: 135 LBS | TEMPERATURE: 97 F | DIASTOLIC BLOOD PRESSURE: 68 MMHG

## 2020-07-31 VITALS
DIASTOLIC BLOOD PRESSURE: 77 MMHG | OXYGEN SATURATION: 97 % | SYSTOLIC BLOOD PRESSURE: 116 MMHG | RESPIRATION RATE: 28 BRPM

## 2020-07-31 PROCEDURE — 3700000001 HC ADD 15 MINUTES (ANESTHESIA): Performed by: SURGERY

## 2020-07-31 PROCEDURE — 2580000003 HC RX 258: Performed by: NURSE ANESTHETIST, CERTIFIED REGISTERED

## 2020-07-31 PROCEDURE — C1753 CATH, INTRAVAS ULTRASOUND: HCPCS | Performed by: SURGERY

## 2020-07-31 PROCEDURE — 43259 EGD US EXAM DUODENUM/JEJUNUM: CPT | Performed by: SURGERY

## 2020-07-31 PROCEDURE — 3609017100 HC EGD: Performed by: SURGERY

## 2020-07-31 PROCEDURE — 6360000002 HC RX W HCPCS: Performed by: NURSE ANESTHETIST, CERTIFIED REGISTERED

## 2020-07-31 PROCEDURE — 7100000010 HC PHASE II RECOVERY - FIRST 15 MIN: Performed by: SURGERY

## 2020-07-31 PROCEDURE — 2500000003 HC RX 250 WO HCPCS: Performed by: NURSE ANESTHETIST, CERTIFIED REGISTERED

## 2020-07-31 PROCEDURE — 7100000011 HC PHASE II RECOVERY - ADDTL 15 MIN: Performed by: SURGERY

## 2020-07-31 PROCEDURE — 3609018500 HC EGD US SCOPE W/ADJACENT STRUCTURES: Performed by: SURGERY

## 2020-07-31 PROCEDURE — 2709999900 HC NON-CHARGEABLE SUPPLY: Performed by: SURGERY

## 2020-07-31 PROCEDURE — 3700000000 HC ANESTHESIA ATTENDED CARE: Performed by: SURGERY

## 2020-07-31 RX ORDER — LIDOCAINE HYDROCHLORIDE 20 MG/ML
INJECTION, SOLUTION INTRAVENOUS PRN
Status: DISCONTINUED | OUTPATIENT
Start: 2020-07-31 | End: 2020-07-31 | Stop reason: SDUPTHER

## 2020-07-31 RX ORDER — GLYCOPYRROLATE 1 MG/5 ML
SYRINGE (ML) INTRAVENOUS PRN
Status: DISCONTINUED | OUTPATIENT
Start: 2020-07-31 | End: 2020-07-31 | Stop reason: SDUPTHER

## 2020-07-31 RX ORDER — SODIUM CHLORIDE 0.9 % (FLUSH) 0.9 %
10 SYRINGE (ML) INJECTION EVERY 12 HOURS SCHEDULED
Status: DISCONTINUED | OUTPATIENT
Start: 2020-07-31 | End: 2020-07-31 | Stop reason: HOSPADM

## 2020-07-31 RX ORDER — PROPOFOL 10 MG/ML
INJECTION, EMULSION INTRAVENOUS PRN
Status: DISCONTINUED | OUTPATIENT
Start: 2020-07-31 | End: 2020-07-31 | Stop reason: SDUPTHER

## 2020-07-31 RX ORDER — PROPOFOL 10 MG/ML
INJECTION, EMULSION INTRAVENOUS CONTINUOUS PRN
Status: DISCONTINUED | OUTPATIENT
Start: 2020-07-31 | End: 2020-07-31 | Stop reason: SDUPTHER

## 2020-07-31 RX ORDER — SODIUM CHLORIDE 0.9 % (FLUSH) 0.9 %
10 SYRINGE (ML) INJECTION PRN
Status: DISCONTINUED | OUTPATIENT
Start: 2020-07-31 | End: 2020-07-31 | Stop reason: HOSPADM

## 2020-07-31 RX ORDER — MIDAZOLAM HYDROCHLORIDE 1 MG/ML
INJECTION INTRAMUSCULAR; INTRAVENOUS PRN
Status: DISCONTINUED | OUTPATIENT
Start: 2020-07-31 | End: 2020-07-31 | Stop reason: SDUPTHER

## 2020-07-31 RX ORDER — SODIUM CHLORIDE 9 MG/ML
INJECTION, SOLUTION INTRAVENOUS CONTINUOUS PRN
Status: DISCONTINUED | OUTPATIENT
Start: 2020-07-31 | End: 2020-07-31 | Stop reason: SDUPTHER

## 2020-07-31 RX ADMIN — MIDAZOLAM 2 MG: 1 INJECTION INTRAMUSCULAR; INTRAVENOUS at 10:42

## 2020-07-31 RX ADMIN — PROPOFOL 90 MG: 10 INJECTION, EMULSION INTRAVENOUS at 10:42

## 2020-07-31 RX ADMIN — PROPOFOL 75 MCG/KG/MIN: 10 INJECTION, EMULSION INTRAVENOUS at 10:42

## 2020-07-31 RX ADMIN — LIDOCAINE HYDROCHLORIDE 60 MG: 20 INJECTION, SOLUTION INTRAVENOUS at 10:43

## 2020-07-31 RX ADMIN — SODIUM CHLORIDE: 9 INJECTION, SOLUTION INTRAVENOUS at 10:34

## 2020-07-31 RX ADMIN — Medication 0.2 MG: at 10:43

## 2020-07-31 ASSESSMENT — PULMONARY FUNCTION TESTS
PIF_VALUE: 0
PIF_VALUE: 1
PIF_VALUE: 0

## 2020-07-31 ASSESSMENT — PAIN SCALES - GENERAL
PAINLEVEL_OUTOF10: 0
PAINLEVEL_OUTOF10: 0

## 2020-07-31 ASSESSMENT — PAIN - FUNCTIONAL ASSESSMENT: PAIN_FUNCTIONAL_ASSESSMENT: 0-10

## 2020-07-31 NOTE — ANESTHESIA POSTPROCEDURE EVALUATION
Department of Anesthesiology  Postprocedure Note    Patient: Ashleigh Valencia  MRN: [id number]  YOB: 1955  Date of evaluation: 8/1/2020  Time:  8:14 AM     Procedure Summary     Date:  07/31/20 Room / Location:  [facility] / [facility]    Anesthesia Start:   Anesthesia Stop:      Procedure:  EGD  ULTRASOUND WITH BIOPSY (N/A ) Diagnosis:  (PANCREATIC HEAD MASS)    Surgeon:  Shiloh Chan MD Responsible Provider:      Anesthesia Type:  MAC ASA Status:  2          Anesthesia Type: MAC    Gorge Phase I: Gorge Score: 10    Gorge Phase II: Gorge Score: 10    Last vitals: Reviewed and per EMR flowsheets.        Anesthesia Post Evaluation    Patient location during evaluation: PACU  Patient participation: complete - patient participated  Level of consciousness: awake  Pain score: 3  Airway patency: patent  Nausea & Vomiting: no nausea and no vomiting  Complications: no  Cardiovascular status: blood pressure returned to baseline  Respiratory status: acceptable  Hydration status: euvolemic

## 2020-07-31 NOTE — H&P
Surgical Endoscopy   History and Physical    Patient's Name/Date of Birth: Pavithra Gaspar /1955 (58 y.o.)    Date: 2020     CC:possible pancreatic head mass    HPI:  Patient is a very pleasant and anxious 63year old female whom presented to the hospital with nausea and emesis. She underwent a cholecystectomy with Dr. Daniela Rollins. She does have a hiatal hernia. 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 She then underwent a MRI which states pancreatic head mass. She denies any weight loss.       Past Medical History:   Diagnosis Date    Allergic rhinitis     Anxiety     Asymptomatic gallstones     B12 deficiency 5/10/2020    Hemangioma of liver     Hernia, hiatal     Intractable vomiting with nausea 2020    Kidney stone     Renal mass, right     Exophytic 11 x 6 mm    [facility] cancer [facility]oma) [facility] [address] 2019    sugically removed from right upper arm       Past Surgical History:   Procedure Laterality Date    CHOLECYSTECTOMY, LAPAROSCOPIC N/A 2020    LAPAROSCOPIC ROBOTIC ASSISTED CHOLECYSTECTOMY performed by Jackelin Noonan MD at [facility] COLONOSCOPY      Negative findings    CYST REMOVAL      fatty cyst removed from back    HYSTERECTOMY      patient states only ovary was removed on right   [place] GASTRO[facility]TEST[facility]AL ENDOSCOPY  2020    Hiatal hernia       Current Facility-Administered Medications   Medication Dose Route Frequency Provider Last Rate Last Dose    sodium chloride flush 0.9 % injection 10 mL  10 mL Intravenous 2 times per day Hina Velasquez MD        sodium chloride flush 0.9 % injection 10 mL  10 mL Intravenous PRN Hina Velasquez MD           Allergies   Allergen Reactions    Zofran [Ondansetron Hcl] Other (See Comments)     Patient was told she has Prolonged QTc interval with Zofran       Family History   Problem Relation Age of Onset    Other Mother         Mother  age 80; many prescribed medication addictions    Cancer Father          age 80, lymphoma    High Cholesterol Sister        Social History     Socioeconomic History    Marital status:      Spouse name: Not on file    Number of children: Not on file    Years of education: Not on file    Highest education level: Not on file   Occupational History    Not on file   Social Needs    Financial resource strain: Not on file    Food insecurity     Worry: Not on file     Inability: Not on file    Transportation needs     Medical: Not on file     Non-medical: Not on file   Tobacco Use    Smoking status: Never Smoker    Smokeless tobacco: Never Used   Substance and Sexual Activity    Alcohol use: No    Drug use: No    Sexual activity: Not on file   Lifestyle    Physical activity     Days per week: Not on file     Minutes per session: Not on file    Stress: Not on file   Relationships    Social connections     Talks on phone: Not on file     Gets together: Not on file     Attends Mandaeism service: Not on file     Active member of club or organization: Not on file     Attends meetings of clubs or organizations: Not on file     Relationship status: Not on file    Intimate partner violence     Fear of current or ex partner: Not on file     Emotionally abused: Not on file     Physically abused: Not on file     Forced sexual activity: Not on file   Other Topics Concern    Not on file   Social History Narrative    Not on file          Assessment/Plan:  Devan Lozano is a 63 y.o. 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 she  agrees to proceed.       2020  10:40 AM

## 2020-07-31 NOTE — ANESTHESIA PRE PROCEDURE
Department of Anesthesiology  Preprocedure Note       Name:  Devan Lozano   Age:  63 y.o.  :  1955                                          MRN:  [id number]         Date:  2020      Surgeon: Jesus Figueroa):  Ned Quezada MD    Procedure: EGD  ULTRASOUND WITH BIOPSY (N/A )    Medications prior to admission:   Prior to Admission medications    Medication Sig Start Date End Date Taking? Authorizing Provider   busPIRone (BUSPAR) 5 MG tablet Take 5 mg by mouth 2 times daily May take a 3rd pill as needed   Yes Historical Provider, MD   loratadine (CLARITIN) 10 MG tablet Take 10 mg by mouth daily   Yes Historical Provider, MD   famotidine (PEPCID) 20 MG tablet Take 20 mg by mouth nightly   Yes Historical Provider, MD   trimethobenzamide (TIGAN) 300 MG capsule Take 300 mg by mouth 3 times daily as needed   Yes Historical Provider, MD   clindamycin (CLEOCIN) 150 MG capsule Take 150 mg by mouth 3 times daily   Yes Historical Provider, MD   pantoprazole (PROTONIX) 40 MG tablet Take 1 tablet by mouth 2 times daily (before meals)  Patient taking differently: Take 40 mg by mouth every morning  5/15/20  Yes Aries Amado DO   sucralfate (CARAFATE) 1 GM tablet Take 1 tablet by mouth every 12 hours  Patient taking differently: Take 1 g by mouth nightly  5/15/20  Yes Aries Amado DO   ibuprofen (ADVIL;MOTRIN) 400 MG tablet Take 1 tablet by mouth every 6 hours as needed for Pain 5/15/20  Yes [address] [place] [address], DO   sodium chloride (OCEAN, BABY AYR) 0.65 % nasal spray 1 spray by Nasal route as needed for Congestion 5/15/20  Yes Aries Amado DO   triamcinolone (ARISTOCORT) 0.5 % cream Apply topically 2 times daily as needed 13  Yes Historical Provider, MD   LORazepam (ATIVAN) 0.5 MG tablet Take 1 mg by mouth 2 times daily.  1mg in am and 3/4 in pm   Yes Historical Provider, MD       Current medications:    Current Facility-Administered Medications   Medication Dose Route Frequency Provider Last Rate Last Dose    sodium chloride flush 0.9 % injection 10 mL  10 mL Intravenous 2 times per day Jeremi Coffey MD        sodium chloride flush 0.9 % injection 10 mL  10 mL Intravenous PRN Jeremi Coffey MD           Allergies: Allergies   Allergen Reactions    Zofran [Ondansetron Hcl] Other (See Comments)     Patient was told she has Prolonged QTc interval with Zofran       Problem List:    Patient Active Problem List   Diagnosis Code    History of hiatal hernia Z87.19    Hemangioma of liver D18.03    Anxiety F41.9    Gastritis K29.70       Past Medical History:        Diagnosis Date    Allergic rhinitis     Anxiety     Asymptomatic gallstones 2020    B12 deficiency 5/10/2020    Hemangioma of liver     Hernia, hiatal     Intractable vomiting with nausea 5/9/2020    Kidney stone     Renal mass, right     Exophytic 11 x 6 mm    Skin cancer (melanoma) ([facility] [address] 06/2019    sugically removed from right upper arm       Past Surgical History:        Procedure Laterality Date    CHOLECYSTECTOMY, LAPAROSCOPIC N/A 5/12/2020    LAPAROSCOPIC ROBOTIC ASSISTED CHOLECYSTECTOMY performed by Vick Stoll MD at [facility] COLONOSCOPY  2017    Negative findings    CYST REMOVAL      fatty cyst removed from back    HYSTERECTOMY      patient states only ovary was removed on right   [facility] ENDOSCOPY  01/2020    Hiatal hernia       Social History:    Social History     Tobacco Use    Smoking status: Never Smoker    Smokeless tobacco: Never Used   Substance Use Topics    Alcohol use:  No                                Counseling given: Not Answered      Vital Signs (Current):   Vitals:    07/23/20 1506 07/31/20 0914   BP:  128/73   Pulse:  89   Resp:  14   Temp:  98.8 °F (37.1 °C)   TempSrc:  Temporal   SpO2:  99%   Weight: 135 lb (61.2 kg) 135 lb (61.2 kg)   Height: 5' 2\" (1.575 m) 5' 2\" (1.575 m)                                              BP Readings from Last 3 Encounters: 07/31/20 128/73   06/22/20 116/74   05/15/20 122/60       NPO Status: Time of last liquid consumption: 0800Pt instructed to remain NPO beginning 0000 DOS                        Time of last solid consumption: 2100                        Date of last liquid consumption: 07/31/20                        Date of last solid food consumption: 07/30/20    BMI:   Wt Readings from Last 3 Encounters:   07/31/20 135 lb (61.2 kg)   06/19/20 141 lb 6.4 oz (64.1 kg)   05/15/20 157 lb (71.2 kg)     Body mass index is 24.69 kg/m². CBC:   Lab Results   Component Value Date    WBC 5.0 06/20/2020    RBC 4.06 06/20/2020    HGB 12.6 06/20/2020    HCT 37.8 06/20/2020    MCV 93.1 06/20/2020    RDW 12.7 06/20/2020     06/20/2020       CMP:   Lab Results   Component Value Date     06/20/2020    K 3.3 06/20/2020     06/20/2020    CO2 22 06/20/2020    BUN 6 06/20/2020    CREATININE 0.7 06/20/2020    GFRAA >60 06/20/2020    LABGLOM >60 06/20/2020    GLUCOSE 88 06/20/2020    PROT 6.7 06/22/2020    CALCIUM 8.9 06/20/2020    BILITOT 0.2 06/22/2020    ALKPHOS 67 06/22/2020    AST 14 06/22/2020    ALT 12 06/22/2020       POC Tests: No results for input(s): POCGLU, POCNA, POCK, POCCL, POCBUN, POCHEMO, POCHCT in the last 72 hours.     Coags: No results found for: PROTIME, INR, APTT    HCG (If Applicable): No results found for: PREGTESTUR, PREGSERUM, HCG, HCGQUANT     ABGs: No results found for: PHART, PO2ART, NHN0MIW, GWY3XOB, BEART, A7IMSZWB     Type & Screen (If Applicable):  No results found for: LABABO, [address]     EKG 5-8-2020  Component Value Ref Range & Units Status Collected Lab   Ventricular Rate 113  BPM Final 05/08/2020 10:52 PM [facility]   Atrial Rate 113  BPM Final 05/08/2020 10:52 PM [facility]   P-R Interval 160  ms Final 05/08/2020 10:52 PM [facility]   QRS Duration 88  ms Final 05/08/2020 10:52 PM [facility]   Q-T Interval 346  ms Final 05/08/2020 10:52 PM [facility]   QTc Calculation (Bazett) 474  ms Final 05/08/2020 10:52 PM [facility]   P [place] 50  degrees Final 05/08/2020 10:52 PM [facility]   R Axis 73  degrees Final 05/08/2020 10:52 PM [facility]   T [place] 26  degrees Final 05/08/2020 10:52 PM [facility]   Testing Performed By     Lab - [address] Name Director Address Valid Date Range   360-[facility] MUSE Unknown Unknown 04/18/16 0721-Present   Narrative & Impression     Sinus tachycardia  Possible Left atrial enlargement  Nonspecific ST and T wave abnormality  Abnormal ECG  When compared with ECG of 10-DEC-2019 10:28,  No significant change was found  Confirmed by Ashlee Bruce ([id number]) on 5/9/2020 6:26:13 AM       CT ABD 5-9-2020    Impression   1. No acute inflammatory changes omental mesenteric fat planes, free   intraperitoneal air, ascites or indication for bowel obstruction.       2. Presence of multiple gallstones.        3. No dilatation biliary tree or pancreatic ductal system.       4. No obstructive uropathy.       5. Suspicious exophytic lesion in the right kidney measuring 11 x 6   mm. The see above comments and recommendations.       6. Presence of a moderate size hiatal hernia. No signs for   obstruction.                 Anesthesia Evaluation  Patient summary reviewed and Nursing notes reviewed no history of anesthetic complications:   Airway: Mallampati: II  TM distance: >3 FB   Neck ROM: full  Mouth opening: > = 3 FB Dental:          Pulmonary:Negative Pulmonary ROS breath sounds clear to auscultation                             Cardiovascular:  Exercise tolerance: good (>4 METS),         ECG reviewed  Rhythm: regular  Rate: normal    Stress test reviewed       Beta Blocker:  Not on Beta Blocker         Neuro/Psych:   (+) depression/anxiety  (anxiety- ativan daily)            GI/Hepatic/Renal:   (+) hiatal hernia, renal disease: kidney stones,          ROS comment: Hepatic hemangioma    Gallstones +N/V    Exophytic mass of right kidney.    Endo/Other: Negative Endo/Other ROS                    Abdominal: Vascular: negative vascular ROS. Anesthesia Plan      MAC     ASA 2       Induction: intravenous. Anesthetic plan and risks discussed with patient. Plan discussed with CRNA and attending. Laura Schmidt MD   7/31/2020    DOS STAFF ADDENDUM:    Pt seen and examined, physical exam updated, chart reviewed including anesthesia, drug and allergy history. H&P reviewed. No interval changes to history or physical examination (unless noted above). NPO status confirmed. Anesthetic plan, risks, benefits, alternatives discussed with patient. Patient verbalized an understanding and agrees to proceed.      Laura Schmidt MD  Staff Anesthesiologist  9:56 AM

## 2020-07-31 NOTE — PROGRESS NOTES
Admitted to Same Day Surgery Unit. Preop instructions given to patient & family. COVID Test done: Yes    Results: Not detected    Self-quarantine guidelines followed since tested? Patient stated she visited daughter outdoors. Any unusual S/S or concerns expressed or observed?    None

## 2020-07-31 NOTE — PROGRESS NOTES
Patient given discharge instructions. Patient and family verbalized understanding. No other questions at this time.

## 2020-08-03 ENCOUNTER — TELEPHONE (OUTPATIENT)
Dept: SURGERY | Age: 65
End: 2020-08-03

## 2020-11-02 ENCOUNTER — PREP FOR PROCEDURE (OUTPATIENT)
Dept: UROLOGY | Age: 65
End: 2020-11-02

## 2020-11-02 DIAGNOSIS — N28.89 RENAL MASS, RIGHT: Primary | ICD-10-CM

## 2020-11-02 RX ORDER — SODIUM CHLORIDE 9 MG/ML
INJECTION, SOLUTION INTRAVENOUS CONTINUOUS
Status: CANCELLED | OUTPATIENT
Start: 2020-11-02

## 2020-11-02 RX ORDER — SODIUM CHLORIDE 0.9 % (FLUSH) 0.9 %
10 SYRINGE (ML) INJECTION PRN
Status: CANCELLED | OUTPATIENT
Start: 2020-11-02

## 2020-11-02 RX ORDER — SODIUM CHLORIDE 0.9 % (FLUSH) 0.9 %
10 SYRINGE (ML) INJECTION EVERY 12 HOURS SCHEDULED
Status: CANCELLED | OUTPATIENT
Start: 2020-11-02

## 2020-11-03 RX ORDER — ACETAMINOPHEN 160 MG
TABLET,DISINTEGRATING ORAL
COMMUNITY

## 2020-11-03 NOTE — PROGRESS NOTES
Nal[facility] 38 PRE-ADMISSION TESTING GENERAL INSTRUCTIONS- [facility]-phone number:[phone]    GENERAL INSTRUCTIONS  [x] Antibacterial Soap shower Night before and/or AM of Surgery    [x] Nothing by mouth after midnight, including gum, candy, mints, or water. [x] You may brush your teeth, gargle, but do NOT swallow water. [x]No smoking, chewing tobacco, illegal drugs, or alcohol within 24 hours of your surgery. [x] Jewelry, valuables or body piercing's should not be brought to the hospital. All body and/or tongue piercing's must be removed prior to arriving to hospital.  ALL hair pins must be removed. [x] Do not wear makeup, lotions, powders, deodorant. Nail polish as directed by the nurse. [x] Arrange transportation with a responsible adult  to and from the hospital. If you do not have a responsible adult  to transport you, you will need to make arrangements with a medical transportation company (i.e. [employer]. A Uber/taxi/bus is not appropriate unless you are accompanied by a responsible adult ). Arrange for someone to be with you for the remainder of the day and for 24 hours after your procedure due to having had anesthesia. Who will be your  for transportation?____DaughterSindy______________   . [x] Transfusion Bracelet: Please bring with you to hospital, day of surgery    [x] Bring copy of living will or healthcare power of  papers to be placed in your electronic record. PARKING INSTRUCTIONS:   [x] Arrival Time:___0500__________  [x]   PLEASE ARRIVE  [facility] ENTRANCE 11/9/2020. ARIIVE AT 0500 AM, SURGERY WITH  [address] WILL BE DIRECTED TO PRE OP. [x] To reach the [facility] lobby from [address] [facility], upon entering the hospital, take elevator B to the 3rd floor.     EDUCATION INSTRUCTIONS:        [x] Pre-admission Testing educational folder given  [x] Incentive Spirometry,coughing & deep breathing exercises reviewed. [x]Medication information sheet(s)   [x]Fluoroscopy-Xray used in surgery reviewed with patient. Educational pamphlet placed in chart. [x]Pain: Post-op pain is normal and to be expected. You will be asked to rate your pain from 0-10(a zero is not acceptable-education is needed). Your post-op pain goal is:  [x] Ask your nurse for your pain medication. MEDICATION INSTRUCTIONS:   [x]Bring a complete list of your medications, please write the last time you took the medicine, give this list to the nurse. [x] Take the following medications the morning of surgery with 1-2 ounces of water: SEE LIST  [x] Stop herbal supplements and vitamins 5 days before your surgery. [x] Follow physician instructions regarding any blood thinners you may be taking. WHAT TO EXPECT:  [x] The day of surgery you will be greeted and checked in by the [employer].  In addition, you will be registered in the [facility] by a Patient Access Representative. Please bring your photo ID and insurance card. A nurse will greet you in accordance to the time you are needed in the pre-op area to prepare you for surgery. Please do not be discouraged if you are not greeted in the order you arrive as there are many variables that are involved in patient preparation. Your patience is greatly appreciated as you wait for your nurse. Please bring in items such as: books, magazines, newspapers, electronics, or any other items  to occupy your time in the waiting area. [x]  Delays may occur with surgery and staff will make a sincere effort to keep you informed of delays. If any delays occur with your procedure, we apologize ahead of time for your inconvenience as we recognize the value of your time.

## 2020-11-04 ENCOUNTER — HOSPITAL ENCOUNTER (OUTPATIENT)
Age: 65
Discharge: HOME | End: 2020-11-06
Payer: COMMERCIAL

## 2020-11-04 ENCOUNTER — HOSPITAL ENCOUNTER (OUTPATIENT)
Dept: PREADMISSION TESTING | Age: 65
Discharge: HOME | End: 2020-11-04
Payer: COMMERCIAL

## 2020-11-04 VITALS
SYSTOLIC BLOOD PRESSURE: 125 MMHG | WEIGHT: 150 LBS | DIASTOLIC BLOOD PRESSURE: 67 MMHG | HEART RATE: 86 BPM | HEIGHT: 62 IN | OXYGEN SATURATION: 98 % | RESPIRATION RATE: 20 BRPM | BODY MASS INDEX: 27.6 KG/M2 | TEMPERATURE: 97.8 F

## 2020-11-04 LAB
ABO/RH: NORMAL
ANION GAP SERPL CALCULATED.3IONS-SCNC: 12 MMOL/L (ref 7–16)
ANTIBODY SCREEN: NORMAL
BUN BLDV-MCNC: 15 MG/DL (ref 8–23)
CALCIUM SERPL-MCNC: 10.1 MG/DL (ref 8.6–10.2)
CHLORIDE BLD-SCNC: 104 MMOL/L (ref 98–107)
CO2: 23 MMOL/L (ref 22–29)
CREAT SERPL-MCNC: 0.7 MG/DL (ref 0.5–1)
GFR AFRICAN AMERICAN: >60
GFR NON-AFRICAN AMERICAN: >60 ML/MIN/1.73
GLUCOSE BLD-MCNC: 118 MG/DL (ref 74–99)
HCT VFR BLD CALC: 42.9 % (ref 34–48)
HEMOGLOBIN: 14.7 G/DL (ref 11.5–15.5)
MCH RBC QN AUTO: 31.1 PG (ref 26–35)
MCHC RBC AUTO-ENTMCNC: 34.3 % (ref 32–34.5)
MCV RBC AUTO: 90.7 FL (ref 80–99.9)
PDW BLD-RTO: 11.6 FL (ref 11.5–15)
PLATELET # BLD: 237 E9/L (ref 130–450)
PMV BLD AUTO: 9.1 FL (ref 7–12)
POTASSIUM SERPL-SCNC: 3.9 MMOL/L (ref 3.5–5)
RBC # BLD: 4.73 E12/L (ref 3.5–5.5)
SODIUM BLD-SCNC: 139 MMOL/L (ref 132–146)
WBC # BLD: 5.9 E9/L (ref 4.5–11.5)

## 2020-11-04 PROCEDURE — 86900 BLOOD TYPING SEROLOGIC ABO: CPT

## 2020-11-04 PROCEDURE — 36415 COLL VENOUS BLD VENIPUNCTURE: CPT

## 2020-11-04 PROCEDURE — U0003 INFECTIOUS AGENT DETECTION BY NUCLEIC ACID (DNA OR RNA); SEVERE ACUTE RESPIRATORY SYNDROME CORONAVIRUS 2 (SARS-COV-2) (CORONAVIRUS DISEASE [COVID-19]), AMPLIFIED PROBE TECHNIQUE, MAKING USE OF HIGH THROUGHPUT TECHNOLOGIES AS DESCRIBED BY CMS-2020-01-R: HCPCS

## 2020-11-04 PROCEDURE — 85027 COMPLETE CBC AUTOMATED: CPT

## 2020-11-04 PROCEDURE — 86923 COMPATIBILITY TEST ELECTRIC: CPT

## 2020-11-04 PROCEDURE — 80048 BASIC METABOLIC PNL TOTAL CA: CPT

## 2020-11-04 PROCEDURE — 86850 RBC ANTIBODY SCREEN: CPT

## 2020-11-04 PROCEDURE — 86901 BLOOD TYPING SEROLOGIC RH(D): CPT

## 2020-11-05 LAB
SARS-COV-2: NOT DETECTED
SOURCE: NORMAL

## 2020-11-07 ENCOUNTER — ANESTHESIA EVENT (OUTPATIENT)
Dept: OPERATING ROOM | Age: 65
DRG: 950 | End: 2020-11-07
Payer: COMMERCIAL

## 2020-11-09 ENCOUNTER — HOSPITAL ENCOUNTER (INPATIENT)
Age: 65
LOS: 1 days | Discharge: HOME | DRG: 950 | End: 2020-11-10
Attending: UROLOGY | Admitting: UROLOGY
Payer: COMMERCIAL

## 2020-11-09 ENCOUNTER — ANESTHESIA (OUTPATIENT)
Dept: OPERATING ROOM | Age: 65
DRG: 950 | End: 2020-11-09
Payer: COMMERCIAL

## 2020-11-09 VITALS — DIASTOLIC BLOOD PRESSURE: 77 MMHG | OXYGEN SATURATION: 100 % | TEMPERATURE: 94.3 F | SYSTOLIC BLOOD PRESSURE: 126 MMHG

## 2020-11-09 PROBLEM — N28.89 RIGHT RENAL MASS: Status: ACTIVE | Noted: 2020-11-09

## 2020-11-09 LAB
ANION GAP SERPL CALCULATED.3IONS-SCNC: 3 MMOL/L (ref 7–16)
BLOOD BANK DISPENSE STATUS: NORMAL
BLOOD BANK DISPENSE STATUS: NORMAL
BLOOD BANK PRODUCT CODE: NORMAL
BLOOD BANK PRODUCT CODE: NORMAL
BPU ID: NORMAL
BPU ID: NORMAL
BUN BLDV-MCNC: 18 MG/DL (ref 8–23)
CALCIUM SERPL-MCNC: 7.7 MG/DL (ref 8.6–10.2)
CHLORIDE BLD-SCNC: 111 MMOL/L (ref 98–107)
CO2: 23 MMOL/L (ref 22–29)
CREAT SERPL-MCNC: 0.7 MG/DL (ref 0.5–1)
DESCRIPTION BLOOD BANK: NORMAL
DESCRIPTION BLOOD BANK: NORMAL
GFR AFRICAN AMERICAN: >60
GFR NON-AFRICAN AMERICAN: >60 ML/MIN/1.73
GLUCOSE BLD-MCNC: 175 MG/DL (ref 74–99)
HCT VFR BLD CALC: 37.1 % (ref 34–48)
HEMOGLOBIN: 12.1 G/DL (ref 11.5–15.5)
POTASSIUM SERPL-SCNC: 3.6 MMOL/L (ref 3.5–5)
SODIUM BLD-SCNC: 137 MMOL/L (ref 132–146)

## 2020-11-09 PROCEDURE — 2580000003 HC RX 258: Performed by: UROLOGY

## 2020-11-09 PROCEDURE — 1200000000 HC SEMI PRIVATE

## 2020-11-09 PROCEDURE — 85018 HEMOGLOBIN: CPT

## 2020-11-09 PROCEDURE — 2580000003 HC RX 258: Performed by: REGISTERED NURSE

## 2020-11-09 PROCEDURE — 2709999900 HC NON-CHARGEABLE SUPPLY: Performed by: UROLOGY

## 2020-11-09 PROCEDURE — 6370000000 HC RX 637 (ALT 250 FOR IP): Performed by: ANESTHESIOLOGY

## 2020-11-09 PROCEDURE — 3700000001 HC ADD 15 MINUTES (ANESTHESIA): Performed by: UROLOGY

## 2020-11-09 PROCEDURE — 85014 HEMATOCRIT: CPT

## 2020-11-09 PROCEDURE — 2580000003 HC RX 258: Performed by: NURSE PRACTITIONER

## 2020-11-09 PROCEDURE — 6370000000 HC RX 637 (ALT 250 FOR IP): Performed by: UROLOGY

## 2020-11-09 PROCEDURE — 6360000002 HC RX W HCPCS: Performed by: REGISTERED NURSE

## 2020-11-09 PROCEDURE — 6360000002 HC RX W HCPCS

## 2020-11-09 PROCEDURE — 6360000002 HC RX W HCPCS: Performed by: UROLOGY

## 2020-11-09 PROCEDURE — S2900 ROBOTIC SURGICAL SYSTEM: HCPCS | Performed by: UROLOGY

## 2020-11-09 PROCEDURE — 7100000001 HC PACU RECOVERY - ADDTL 15 MIN: Performed by: UROLOGY

## 2020-11-09 PROCEDURE — 3700000000 HC ANESTHESIA ATTENDED CARE: Performed by: UROLOGY

## 2020-11-09 PROCEDURE — 7100000000 HC PACU RECOVERY - FIRST 15 MIN: Performed by: UROLOGY

## 2020-11-09 PROCEDURE — 6360000002 HC RX W HCPCS: Performed by: ANESTHESIOLOGY

## 2020-11-09 PROCEDURE — 8E0W4CZ ROBOTIC ASSISTED PROCEDURE OF TRUNK REGION, PERCUTANEOUS ENDOSCOPIC APPROACH: ICD-10-PCS | Performed by: UROLOGY

## 2020-11-09 PROCEDURE — 2500000003 HC RX 250 WO HCPCS: Performed by: UROLOGY

## 2020-11-09 PROCEDURE — 80048 BASIC METABOLIC PNL TOTAL CA: CPT

## 2020-11-09 PROCEDURE — 6360000002 HC RX W HCPCS: Performed by: NURSE PRACTITIONER

## 2020-11-09 PROCEDURE — 3600000019 HC SURGERY ROBOT ADDTL 15MIN: Performed by: UROLOGY

## 2020-11-09 PROCEDURE — 0TB04ZZ EXCISION OF RIGHT KIDNEY, PERCUTANEOUS ENDOSCOPIC APPROACH: ICD-10-PCS | Performed by: UROLOGY

## 2020-11-09 PROCEDURE — 88307 TISSUE EXAM BY PATHOLOGIST: CPT

## 2020-11-09 PROCEDURE — 2500000003 HC RX 250 WO HCPCS

## 2020-11-09 PROCEDURE — 2500000003 HC RX 250 WO HCPCS: Performed by: REGISTERED NURSE

## 2020-11-09 PROCEDURE — 2780000010 HC IMPLANT OTHER: Performed by: UROLOGY

## 2020-11-09 PROCEDURE — 3600000009 HC SURGERY ROBOT BASE: Performed by: UROLOGY

## 2020-11-09 PROCEDURE — 36415 COLL VENOUS BLD VENIPUNCTURE: CPT

## 2020-11-09 RX ORDER — FENTANYL CITRATE 50 UG/ML
INJECTION, SOLUTION INTRAMUSCULAR; INTRAVENOUS PRN
Status: DISCONTINUED | OUTPATIENT
Start: 2020-11-09 | End: 2020-11-09 | Stop reason: SDUPTHER

## 2020-11-09 RX ORDER — LIDOCAINE HYDROCHLORIDE 20 MG/ML
INJECTION, SOLUTION INTRAVENOUS PRN
Status: DISCONTINUED | OUTPATIENT
Start: 2020-11-09 | End: 2020-11-09 | Stop reason: SDUPTHER

## 2020-11-09 RX ORDER — HYDROMORPHONE HCL 110MG/55ML
PATIENT CONTROLLED ANALGESIA SYRINGE INTRAVENOUS PRN
Status: DISCONTINUED | OUTPATIENT
Start: 2020-11-09 | End: 2020-11-09 | Stop reason: SDUPTHER

## 2020-11-09 RX ORDER — LORAZEPAM 1 MG/1
1 TABLET ORAL 2 TIMES DAILY
Status: DISCONTINUED | OUTPATIENT
Start: 2020-11-09 | End: 2020-11-09

## 2020-11-09 RX ORDER — SODIUM CHLORIDE, SODIUM LACTATE, POTASSIUM CHLORIDE, CALCIUM CHLORIDE 600; 310; 30; 20 MG/100ML; MG/100ML; MG/100ML; MG/100ML
INJECTION, SOLUTION INTRAVENOUS CONTINUOUS PRN
Status: DISCONTINUED | OUTPATIENT
Start: 2020-11-09 | End: 2020-11-09 | Stop reason: SDUPTHER

## 2020-11-09 RX ORDER — ROCURONIUM BROMIDE 10 MG/ML
INJECTION, SOLUTION INTRAVENOUS PRN
Status: DISCONTINUED | OUTPATIENT
Start: 2020-11-09 | End: 2020-11-09 | Stop reason: SDUPTHER

## 2020-11-09 RX ORDER — DEXTROSE, SODIUM CHLORIDE, AND POTASSIUM CHLORIDE 5; .45; .15 G/100ML; G/100ML; G/100ML
INJECTION INTRAVENOUS
Status: COMPLETED
Start: 2020-11-09 | End: 2020-11-09

## 2020-11-09 RX ORDER — MIDAZOLAM HYDROCHLORIDE 1 MG/ML
INJECTION INTRAMUSCULAR; INTRAVENOUS PRN
Status: DISCONTINUED | OUTPATIENT
Start: 2020-11-09 | End: 2020-11-09 | Stop reason: SDUPTHER

## 2020-11-09 RX ORDER — NEOSTIGMINE METHYLSULFATE 1 MG/ML
INJECTION, SOLUTION INTRAVENOUS PRN
Status: DISCONTINUED | OUTPATIENT
Start: 2020-11-09 | End: 2020-11-09 | Stop reason: SDUPTHER

## 2020-11-09 RX ORDER — KETOROLAC TROMETHAMINE 30 MG/ML
INJECTION, SOLUTION INTRAMUSCULAR; INTRAVENOUS
Status: COMPLETED
Start: 2020-11-09 | End: 2020-11-09

## 2020-11-09 RX ORDER — DEXTROSE, SODIUM CHLORIDE, AND POTASSIUM CHLORIDE 5; .45; .15 G/100ML; G/100ML; G/100ML
INJECTION INTRAVENOUS CONTINUOUS
Status: DISCONTINUED | OUTPATIENT
Start: 2020-11-09 | End: 2020-11-10 | Stop reason: HOSPADM

## 2020-11-09 RX ORDER — FAMOTIDINE 20 MG/1
20 TABLET, FILM COATED ORAL NIGHTLY
Status: DISCONTINUED | OUTPATIENT
Start: 2020-11-09 | End: 2020-11-10 | Stop reason: HOSPADM

## 2020-11-09 RX ORDER — SENNA PLUS 8.6 MG/1
1 TABLET ORAL 2 TIMES DAILY
Status: DISCONTINUED | OUTPATIENT
Start: 2020-11-09 | End: 2020-11-10 | Stop reason: HOSPADM

## 2020-11-09 RX ORDER — SODIUM CHLORIDE 9 MG/ML
INJECTION, SOLUTION INTRAVENOUS CONTINUOUS
Status: DISCONTINUED | OUTPATIENT
Start: 2020-11-09 | End: 2020-11-09

## 2020-11-09 RX ORDER — KETOROLAC TROMETHAMINE 30 MG/ML
30 INJECTION, SOLUTION INTRAMUSCULAR; INTRAVENOUS EVERY 8 HOURS SCHEDULED
Status: COMPLETED | OUTPATIENT
Start: 2020-11-09 | End: 2020-11-10

## 2020-11-09 RX ORDER — MEPERIDINE HYDROCHLORIDE 25 MG/ML
12.5 INJECTION INTRAMUSCULAR; INTRAVENOUS; SUBCUTANEOUS EVERY 5 MIN PRN
Status: DISCONTINUED | OUTPATIENT
Start: 2020-11-09 | End: 2020-11-09 | Stop reason: HOSPADM

## 2020-11-09 RX ORDER — BUSPIRONE HYDROCHLORIDE 10 MG/1
10 TABLET ORAL 2 TIMES DAILY
Status: DISCONTINUED | OUTPATIENT
Start: 2020-11-09 | End: 2020-11-10 | Stop reason: HOSPADM

## 2020-11-09 RX ORDER — LORAZEPAM 0.5 MG/1
0.5 TABLET ORAL 2 TIMES DAILY
Status: DISCONTINUED | OUTPATIENT
Start: 2020-11-09 | End: 2020-11-10 | Stop reason: HOSPADM

## 2020-11-09 RX ORDER — BUPIVACAINE HYDROCHLORIDE 5 MG/ML
INJECTION, SOLUTION EPIDURAL; INTRACAUDAL PRN
Status: DISCONTINUED | OUTPATIENT
Start: 2020-11-09 | End: 2020-11-09 | Stop reason: ALTCHOICE

## 2020-11-09 RX ORDER — HYDROCODONE BITARTRATE AND ACETAMINOPHEN 5; 325 MG/1; MG/1
2 TABLET ORAL EVERY 4 HOURS PRN
Status: DISCONTINUED | OUTPATIENT
Start: 2020-11-09 | End: 2020-11-10 | Stop reason: HOSPADM

## 2020-11-09 RX ORDER — DEXAMETHASONE SODIUM PHOSPHATE 10 MG/ML
INJECTION, SOLUTION INTRAMUSCULAR; INTRAVENOUS PRN
Status: DISCONTINUED | OUTPATIENT
Start: 2020-11-09 | End: 2020-11-09 | Stop reason: SDUPTHER

## 2020-11-09 RX ORDER — DIPHENHYDRAMINE HYDROCHLORIDE 50 MG/ML
12.5 INJECTION INTRAMUSCULAR; INTRAVENOUS
Status: DISCONTINUED | OUTPATIENT
Start: 2020-11-09 | End: 2020-11-09 | Stop reason: HOSPADM

## 2020-11-09 RX ORDER — MANNITOL 250 MG/ML
INJECTION, SOLUTION INTRAVENOUS PRN
Status: DISCONTINUED | OUTPATIENT
Start: 2020-11-09 | End: 2020-11-09 | Stop reason: SDUPTHER

## 2020-11-09 RX ORDER — HYDROCODONE BITARTRATE AND ACETAMINOPHEN 5; 325 MG/1; MG/1
1 TABLET ORAL EVERY 6 HOURS PRN
Qty: 12 TABLET | Refills: 0 | Status: SHIPPED | OUTPATIENT
Start: 2020-11-09 | End: 2020-11-16

## 2020-11-09 RX ORDER — PROMETHAZINE HYDROCHLORIDE 25 MG/ML
6.25 INJECTION, SOLUTION INTRAMUSCULAR; INTRAVENOUS
Status: DISCONTINUED | OUTPATIENT
Start: 2020-11-09 | End: 2020-11-09 | Stop reason: HOSPADM

## 2020-11-09 RX ORDER — MORPHINE SULFATE 2 MG/ML
2 INJECTION, SOLUTION INTRAMUSCULAR; INTRAVENOUS EVERY 4 HOURS PRN
Status: DISCONTINUED | OUTPATIENT
Start: 2020-11-09 | End: 2020-11-10 | Stop reason: HOSPADM

## 2020-11-09 RX ORDER — SODIUM CHLORIDE 0.9 % (FLUSH) 0.9 %
10 SYRINGE (ML) INJECTION PRN
Status: DISCONTINUED | OUTPATIENT
Start: 2020-11-09 | End: 2020-11-09 | Stop reason: HOSPADM

## 2020-11-09 RX ORDER — PROPOFOL 10 MG/ML
INJECTION, EMULSION INTRAVENOUS PRN
Status: DISCONTINUED | OUTPATIENT
Start: 2020-11-09 | End: 2020-11-09 | Stop reason: SDUPTHER

## 2020-11-09 RX ORDER — SENNA AND DOCUSATE SODIUM 50; 8.6 MG/1; MG/1
1 TABLET, FILM COATED ORAL DAILY
Status: DISCONTINUED | OUTPATIENT
Start: 2020-11-09 | End: 2020-11-10 | Stop reason: HOSPADM

## 2020-11-09 RX ORDER — SODIUM CHLORIDE 0.9 % (FLUSH) 0.9 %
10 SYRINGE (ML) INJECTION EVERY 12 HOURS SCHEDULED
Status: DISCONTINUED | OUTPATIENT
Start: 2020-11-09 | End: 2020-11-09 | Stop reason: HOSPADM

## 2020-11-09 RX ORDER — OXYCODONE HYDROCHLORIDE AND ACETAMINOPHEN 5; 325 MG/1; MG/1
1 TABLET ORAL
Status: DISCONTINUED | OUTPATIENT
Start: 2020-11-09 | End: 2020-11-09 | Stop reason: HOSPADM

## 2020-11-09 RX ORDER — MEPERIDINE HYDROCHLORIDE 25 MG/ML
12.5 INJECTION INTRAMUSCULAR; INTRAVENOUS; SUBCUTANEOUS PRN
Status: DISCONTINUED | OUTPATIENT
Start: 2020-11-09 | End: 2020-11-10

## 2020-11-09 RX ORDER — METOCLOPRAMIDE HYDROCHLORIDE 5 MG/ML
10 INJECTION INTRAMUSCULAR; INTRAVENOUS 2 TIMES DAILY
Status: DISCONTINUED | OUTPATIENT
Start: 2020-11-09 | End: 2020-11-10 | Stop reason: HOSPADM

## 2020-11-09 RX ORDER — ACETAMINOPHEN 325 MG/1
650 TABLET ORAL EVERY 6 HOURS SCHEDULED
Status: DISCONTINUED | OUTPATIENT
Start: 2020-11-09 | End: 2020-11-10 | Stop reason: HOSPADM

## 2020-11-09 RX ORDER — 0.9 % SODIUM CHLORIDE 0.9 %
250 INTRAVENOUS SOLUTION INTRAVENOUS ONCE
Status: DISCONTINUED | OUTPATIENT
Start: 2020-11-09 | End: 2020-11-10 | Stop reason: HOSPADM

## 2020-11-09 RX ORDER — SENNA PLUS 8.6 MG/1
1 TABLET ORAL 2 TIMES DAILY
Qty: 20 TABLET | Refills: 0 | Status: SHIPPED | OUTPATIENT
Start: 2020-11-09 | End: 2021-03-13 | Stop reason: ALTCHOICE

## 2020-11-09 RX ORDER — GLYCOPYRROLATE 1 MG/5 ML
SYRINGE (ML) INTRAVENOUS PRN
Status: DISCONTINUED | OUTPATIENT
Start: 2020-11-09 | End: 2020-11-09 | Stop reason: SDUPTHER

## 2020-11-09 RX ORDER — MIDAZOLAM HYDROCHLORIDE 1 MG/ML
1 INJECTION INTRAMUSCULAR; INTRAVENOUS EVERY 10 MIN PRN
Status: DISCONTINUED | OUTPATIENT
Start: 2020-11-09 | End: 2020-11-09 | Stop reason: HOSPADM

## 2020-11-09 RX ORDER — SODIUM CHLORIDE 9 MG/ML
INJECTION, SOLUTION INTRAVENOUS CONTINUOUS PRN
Status: DISCONTINUED | OUTPATIENT
Start: 2020-11-09 | End: 2020-11-09 | Stop reason: SDUPTHER

## 2020-11-09 RX ORDER — SUCCINYLCHOLINE/SOD CL,ISO/PF 200MG/10ML
SYRINGE (ML) INTRAVENOUS PRN
Status: DISCONTINUED | OUTPATIENT
Start: 2020-11-09 | End: 2020-11-09 | Stop reason: SDUPTHER

## 2020-11-09 RX ORDER — FENTANYL CITRATE 50 UG/ML
25 INJECTION, SOLUTION INTRAMUSCULAR; INTRAVENOUS EVERY 5 MIN PRN
Status: DISCONTINUED | OUTPATIENT
Start: 2020-11-09 | End: 2020-11-09 | Stop reason: HOSPADM

## 2020-11-09 RX ORDER — BUSPIRONE HYDROCHLORIDE 5 MG/1
5 TABLET ORAL 2 TIMES DAILY
Status: DISCONTINUED | OUTPATIENT
Start: 2020-11-09 | End: 2020-11-09

## 2020-11-09 RX ORDER — PANTOPRAZOLE SODIUM 40 MG/1
40 TABLET, DELAYED RELEASE ORAL
Status: DISCONTINUED | OUTPATIENT
Start: 2020-11-09 | End: 2020-11-10 | Stop reason: HOSPADM

## 2020-11-09 RX ORDER — SCOLOPAMINE TRANSDERMAL SYSTEM 1 MG/1
1 PATCH, EXTENDED RELEASE TRANSDERMAL ONCE
Status: DISCONTINUED | OUTPATIENT
Start: 2020-11-09 | End: 2020-11-09

## 2020-11-09 RX ORDER — FENTANYL CITRATE 50 UG/ML
50 INJECTION, SOLUTION INTRAMUSCULAR; INTRAVENOUS EVERY 5 MIN PRN
Status: DISCONTINUED | OUTPATIENT
Start: 2020-11-09 | End: 2020-11-09 | Stop reason: HOSPADM

## 2020-11-09 RX ORDER — HYDROCODONE BITARTRATE AND ACETAMINOPHEN 5; 325 MG/1; MG/1
1 TABLET ORAL EVERY 4 HOURS PRN
Status: DISCONTINUED | OUTPATIENT
Start: 2020-11-09 | End: 2020-11-10 | Stop reason: HOSPADM

## 2020-11-09 RX ORDER — PROMETHAZINE HYDROCHLORIDE 25 MG/ML
12.5 INJECTION, SOLUTION INTRAMUSCULAR; INTRAVENOUS EVERY 4 HOURS PRN
Status: DISCONTINUED | OUTPATIENT
Start: 2020-11-09 | End: 2020-11-10

## 2020-11-09 RX ADMIN — SODIUM CHLORIDE, POTASSIUM CHLORIDE, SODIUM LACTATE AND CALCIUM CHLORIDE: 600; 310; 30; 20 INJECTION, SOLUTION INTRAVENOUS at 06:56

## 2020-11-09 RX ADMIN — HYDROMORPHONE HYDROCHLORIDE 1 MG: 2 INJECTION, SOLUTION INTRAMUSCULAR; INTRAVENOUS; SUBCUTANEOUS at 10:09

## 2020-11-09 RX ADMIN — ROCURONIUM BROMIDE 10 MG: 10 INJECTION, SOLUTION INTRAVENOUS at 08:49

## 2020-11-09 RX ADMIN — POTASSIUM CHLORIDE, DEXTROSE MONOHYDRATE AND SODIUM CHLORIDE: 150; 5; 450 INJECTION, SOLUTION INTRAVENOUS at 11:04

## 2020-11-09 RX ADMIN — Medication 0.6 MG: at 09:54

## 2020-11-09 RX ADMIN — MIDAZOLAM 2 MG: 1 INJECTION INTRAMUSCULAR; INTRAVENOUS at 06:53

## 2020-11-09 RX ADMIN — PROPOFOL 170 MG: 10 INJECTION, EMULSION INTRAVENOUS at 07:00

## 2020-11-09 RX ADMIN — DEXAMETHASONE SODIUM PHOSPHATE 10 MG: 10 INJECTION, SOLUTION INTRAMUSCULAR; INTRAVENOUS at 07:00

## 2020-11-09 RX ADMIN — LIDOCAINE HYDROCHLORIDE 60 MG: 20 INJECTION, SOLUTION INTRAVENOUS at 07:00

## 2020-11-09 RX ADMIN — SODIUM CHLORIDE, POTASSIUM CHLORIDE, SODIUM LACTATE AND CALCIUM CHLORIDE: 600; 310; 30; 20 INJECTION, SOLUTION INTRAVENOUS at 08:34

## 2020-11-09 RX ADMIN — KETOROLAC TROMETHAMINE 30 MG: 30 INJECTION, SOLUTION INTRAMUSCULAR at 11:54

## 2020-11-09 RX ADMIN — MANNITOL 12.5 G: 12.5 INJECTION, SOLUTION INTRAVENOUS at 09:04

## 2020-11-09 RX ADMIN — HYDROCODONE BITARTRATE AND ACETAMINOPHEN 2 TABLET: 5; 325 TABLET ORAL at 16:17

## 2020-11-09 RX ADMIN — SODIUM CHLORIDE: 9 INJECTION, SOLUTION INTRAVENOUS at 06:56

## 2020-11-09 RX ADMIN — KETOROLAC TROMETHAMINE 30 MG: 30 INJECTION, SOLUTION INTRAMUSCULAR at 20:28

## 2020-11-09 RX ADMIN — SODIUM CHLORIDE: 9 INJECTION, SOLUTION INTRAVENOUS at 09:17

## 2020-11-09 RX ADMIN — FAMOTIDINE 20 MG: 20 TABLET ORAL at 22:29

## 2020-11-09 RX ADMIN — MIDAZOLAM 1 MG: 1 INJECTION INTRAMUSCULAR; INTRAVENOUS at 05:54

## 2020-11-09 RX ADMIN — FENTANYL CITRATE 50 MCG: 50 INJECTION, SOLUTION INTRAMUSCULAR; INTRAVENOUS at 08:19

## 2020-11-09 RX ADMIN — POTASSIUM CHLORIDE, DEXTROSE MONOHYDRATE AND SODIUM CHLORIDE: 150; 5; 450 INJECTION, SOLUTION INTRAVENOUS at 12:44

## 2020-11-09 RX ADMIN — HYDROCODONE BITARTRATE AND ACETAMINOPHEN 1 TABLET: 5; 325 TABLET ORAL at 23:45

## 2020-11-09 RX ADMIN — Medication 2 G: at 07:10

## 2020-11-09 RX ADMIN — Medication 3 MG: at 09:54

## 2020-11-09 RX ADMIN — FENTANYL CITRATE 100 MCG: 50 INJECTION, SOLUTION INTRAMUSCULAR; INTRAVENOUS at 07:00

## 2020-11-09 RX ADMIN — SODIUM CHLORIDE: 9 INJECTION, SOLUTION INTRAVENOUS at 07:58

## 2020-11-09 RX ADMIN — Medication 120 MG: at 07:00

## 2020-11-09 RX ADMIN — LORAZEPAM 0.5 MG: 0.5 TABLET ORAL at 22:29

## 2020-11-09 RX ADMIN — CEFAZOLIN 1 G: 1 INJECTION, POWDER, FOR SOLUTION INTRAMUSCULAR; INTRAVENOUS at 16:14

## 2020-11-09 RX ADMIN — FENTANYL CITRATE 50 MCG: 50 INJECTION, SOLUTION INTRAMUSCULAR; INTRAVENOUS at 07:30

## 2020-11-09 RX ADMIN — PROMETHAZINE HYDROCHLORIDE 6.25 MG: 25 INJECTION INTRAMUSCULAR; INTRAVENOUS at 11:26

## 2020-11-09 RX ADMIN — BUSPIRONE HYDROCHLORIDE 10 MG: 10 TABLET ORAL at 22:29

## 2020-11-09 RX ADMIN — SENNOSIDES 8.6 MG: 8.6 TABLET, FILM COATED ORAL at 22:29

## 2020-11-09 RX ADMIN — FENTANYL CITRATE 50 MCG: 50 INJECTION, SOLUTION INTRAMUSCULAR; INTRAVENOUS at 07:47

## 2020-11-09 RX ADMIN — ROCURONIUM BROMIDE 10 MG: 10 INJECTION, SOLUTION INTRAVENOUS at 08:03

## 2020-11-09 RX ADMIN — METOCLOPRAMIDE HYDROCHLORIDE 10 MG: 5 INJECTION INTRAMUSCULAR; INTRAVENOUS at 16:14

## 2020-11-09 RX ADMIN — SODIUM CHLORIDE: 9 INJECTION, SOLUTION INTRAVENOUS at 05:44

## 2020-11-09 RX ADMIN — KETOROLAC TROMETHAMINE 30 MG: 30 INJECTION, SOLUTION INTRAMUSCULAR; INTRAVENOUS at 11:54

## 2020-11-09 RX ADMIN — CEFAZOLIN 1 G: 1 INJECTION, POWDER, FOR SOLUTION INTRAMUSCULAR; INTRAVENOUS at 22:29

## 2020-11-09 RX ADMIN — ROCURONIUM BROMIDE 50 MG: 10 INJECTION, SOLUTION INTRAVENOUS at 07:15

## 2020-11-09 ASSESSMENT — PULMONARY FUNCTION TESTS
PIF_VALUE: 17
PIF_VALUE: 29
PIF_VALUE: 26
PIF_VALUE: 29
PIF_VALUE: 31
PIF_VALUE: 31
PIF_VALUE: 28
PIF_VALUE: 31
PIF_VALUE: 17
PIF_VALUE: 30
PIF_VALUE: 30
PIF_VALUE: 29
PIF_VALUE: 26
PIF_VALUE: 30
PIF_VALUE: 17
PIF_VALUE: 29
PIF_VALUE: 30
PIF_VALUE: 25
PIF_VALUE: 25
PIF_VALUE: 30
PIF_VALUE: 3
PIF_VALUE: 29
PIF_VALUE: 30
PIF_VALUE: 13
PIF_VALUE: 30
PIF_VALUE: 28
PIF_VALUE: 28
PIF_VALUE: 24
PIF_VALUE: 31
PIF_VALUE: 27
PIF_VALUE: 2
PIF_VALUE: 13
PIF_VALUE: 17
PIF_VALUE: 20
PIF_VALUE: 3
PIF_VALUE: 20
PIF_VALUE: 16
PIF_VALUE: 28
PIF_VALUE: 28
PIF_VALUE: 30
PIF_VALUE: 8
PIF_VALUE: 20
PIF_VALUE: 31
PIF_VALUE: 29
PIF_VALUE: 30
PIF_VALUE: 24
PIF_VALUE: 20
PIF_VALUE: 29
PIF_VALUE: 27
PIF_VALUE: 29
PIF_VALUE: 26
PIF_VALUE: 2
PIF_VALUE: 17
PIF_VALUE: 2
PIF_VALUE: 27
PIF_VALUE: 28
PIF_VALUE: 29
PIF_VALUE: 28
PIF_VALUE: 29
PIF_VALUE: 28
PIF_VALUE: 2
PIF_VALUE: 29
PIF_VALUE: 29
PIF_VALUE: 5
PIF_VALUE: 12
PIF_VALUE: 29
PIF_VALUE: 3
PIF_VALUE: 13
PIF_VALUE: 28
PIF_VALUE: 30
PIF_VALUE: 29
PIF_VALUE: 30
PIF_VALUE: 2
PIF_VALUE: 28
PIF_VALUE: 30
PIF_VALUE: 13
PIF_VALUE: 29
PIF_VALUE: 1
PIF_VALUE: 13
PIF_VALUE: 29
PIF_VALUE: 27
PIF_VALUE: 28
PIF_VALUE: 29
PIF_VALUE: 30
PIF_VALUE: 27
PIF_VALUE: 20
PIF_VALUE: 29
PIF_VALUE: 31
PIF_VALUE: 28
PIF_VALUE: 32
PIF_VALUE: 28
PIF_VALUE: 20
PIF_VALUE: 1
PIF_VALUE: 1
PIF_VALUE: 29
PIF_VALUE: 17
PIF_VALUE: 29
PIF_VALUE: 30
PIF_VALUE: 13
PIF_VALUE: 30
PIF_VALUE: 13
PIF_VALUE: 2
PIF_VALUE: 29
PIF_VALUE: 26
PIF_VALUE: 26
PIF_VALUE: 29
PIF_VALUE: 5
PIF_VALUE: 1
PIF_VALUE: 20
PIF_VALUE: 2
PIF_VALUE: 29
PIF_VALUE: 30
PIF_VALUE: 28
PIF_VALUE: 3
PIF_VALUE: 29
PIF_VALUE: 31
PIF_VALUE: 27
PIF_VALUE: 17
PIF_VALUE: 26
PIF_VALUE: 27
PIF_VALUE: 28
PIF_VALUE: 29
PIF_VALUE: 29
PIF_VALUE: 27
PIF_VALUE: 29
PIF_VALUE: 29
PIF_VALUE: 30
PIF_VALUE: 17
PIF_VALUE: 13
PIF_VALUE: 29
PIF_VALUE: 29
PIF_VALUE: 0
PIF_VALUE: 17
PIF_VALUE: 2
PIF_VALUE: 30
PIF_VALUE: 29
PIF_VALUE: 29
PIF_VALUE: 23
PIF_VALUE: 29
PIF_VALUE: 3
PIF_VALUE: 14
PIF_VALUE: 17
PIF_VALUE: 32
PIF_VALUE: 30
PIF_VALUE: 28
PIF_VALUE: 31
PIF_VALUE: 17
PIF_VALUE: 2
PIF_VALUE: 18
PIF_VALUE: 30
PIF_VALUE: 29
PIF_VALUE: 30
PIF_VALUE: 30
PIF_VALUE: 29
PIF_VALUE: 19
PIF_VALUE: 19
PIF_VALUE: 2
PIF_VALUE: 3
PIF_VALUE: 28
PIF_VALUE: 30
PIF_VALUE: 25
PIF_VALUE: 30
PIF_VALUE: 2
PIF_VALUE: 30
PIF_VALUE: 2
PIF_VALUE: 27
PIF_VALUE: 2
PIF_VALUE: 30
PIF_VALUE: 28
PIF_VALUE: 13
PIF_VALUE: 25
PIF_VALUE: 29
PIF_VALUE: 28
PIF_VALUE: 2
PIF_VALUE: 28
PIF_VALUE: 29
PIF_VALUE: 30
PIF_VALUE: 28
PIF_VALUE: 17
PIF_VALUE: 29
PIF_VALUE: 17
PIF_VALUE: 2
PIF_VALUE: 28
PIF_VALUE: 13
PIF_VALUE: 30
PIF_VALUE: 20
PIF_VALUE: 27
PIF_VALUE: 29
PIF_VALUE: 28
PIF_VALUE: 13
PIF_VALUE: 28
PIF_VALUE: 22
PIF_VALUE: 26
PIF_VALUE: 29
PIF_VALUE: 1
PIF_VALUE: 29
PIF_VALUE: 29

## 2020-11-09 ASSESSMENT — PAIN SCALES - GENERAL
PAINLEVEL_OUTOF10: 7
PAINLEVEL_OUTOF10: 3
PAINLEVEL_OUTOF10: 5
PAINLEVEL_OUTOF10: 5
PAINLEVEL_OUTOF10: 0

## 2020-11-09 ASSESSMENT — PAIN DESCRIPTION - PROGRESSION: CLINICAL_PROGRESSION: NOT CHANGED

## 2020-11-09 ASSESSMENT — PAIN DESCRIPTION - ONSET: ONSET: ON-GOING

## 2020-11-09 ASSESSMENT — PAIN DESCRIPTION - DESCRIPTORS: DESCRIPTORS: ACHING;CONSTANT;DISCOMFORT

## 2020-11-09 ASSESSMENT — PAIN DESCRIPTION - LOCATION: LOCATION: ABDOMEN

## 2020-11-09 ASSESSMENT — PAIN - FUNCTIONAL ASSESSMENT
PAIN_FUNCTIONAL_ASSESSMENT: ACTIVITIES ARE NOT PREVENTED
PAIN_FUNCTIONAL_ASSESSMENT: 0-10

## 2020-11-09 ASSESSMENT — PAIN DESCRIPTION - FREQUENCY: FREQUENCY: CONTINUOUS

## 2020-11-09 NOTE — OP NOTE
Operative Note      Patient: Amanda Robles  YOB: 1955  MRN: [id number]    Date of Procedure: 11/9/2020    Pre-Op Diagnosis: RENAL MASS    Post-Op Diagnosis: Same       Procedure(s):  RIGHT NEPHRECTOMY PARTIAL ROBOTIC XI    Surgeon(s):  Flaca Cortés MD    Assistant:   First Assistant: Raymond Amato    Anesthesia: General    Estimated Blood Loss (mL): 745    Complications: None    Specimens:   ID Type Source Tests Collected by Time Destination   A : RIGHT RENAL MASS Tissue Tissue SURGICAL PATHOLOGY Flaca Cortés MD 11/9/2020 0805        Implants:  * No implants in log *      Drains:   Closed/Suction Drain Right Abdomen Bulb 15 Djiboutian (Active)       Urethral Catheter Non-latex 16 fr (Active)       LDA Fistula (Active)         INDICATION FOR PROCEDURE: Amanda Robles  is a 63 y.o. female with a renal mass in the lower pole of the right kidney. The patient was given all treatment options, including observation and cryoablation. The patient elected to undergo robot-assisted  aparoscopic partial nephrectomy and understands the risks, benefits, and alternatives of the procedure, including the potential for need for nephrectomy versus open conversion, postoperative bleeding and urinoma formation. Informed consent was obtained and the correct operative side confirmed and marked pre-operatively with the patient prior to anesthesia administration. PROCEDURE: The patient was brought into the operating room, placed under general anesthesia. A Mack catheter was placed. The patient was positioned on the right flank up position on the surgical table. All areas in contact with the table were adequately padded, and she was secured to the table with 3-inch cloth tape. The table was flexed and the patient was prepped and draped in sterile fashion. An 8 mm incision was placed in the right subcostal margin. One handbreadth   inferior, another 8 mm trocar incision was made.  One handbreadth inferior to that, an additional 8 mm trocar and then an additional 8 mm trocar was placed 1 handbreadth following that in a line. A 12 mm incision was made between the pubis and umbilicus. 5 mm trocar was placed in the epigastric region. Marcaine was used to inject prior to the incisions and the Veress needle was placed in the camera port. Corresponding trochars were placed into each incision without injury to underlying structures under direct visualization. The patient was rotated medially and the Samasource7 robot was safely docked to the patient. The instruments were inserted under direct visualization. The liver was extremely large and obscured the operating field causing significant challenges during this procedure. The white line of Toldt was taken down, reflecting the colon off of the kidney. The duodenum was identified and sharply dissected off of the kidney. The inferior vena cava was then identified and dissection carried out inferiorly to identify the ureter. The ureter was elevated superiorly, leaving the gonadal vein inferior and following the gonadal vein to the entry into the vena cava. Dissection was carried superiorly toward the renal vein, which was identified, as well as the renal artery. The renal artery was dissected and length increased posterior to the vena cava at the takeoff from the aorta. This was cleanly dissected and prepared for placement of bulldog clamps later in the case. Gerota fascia was incised in a longitudinal fashion to the capsule of the kidney. Gerota's was entirely dissected off of the kidney and the tumor was very difficult to identify. CAT scan was available Intra-Op as was ultrasound. After searching this tumor did not appear to be exophytic but rather more endophytic. The tumor was identified on ultrasound and the capsule of the kidney scored around this tumor. The kidney was readily mobilized.  The capsule was scored circumferentially around the tumor, leaving a 1 cm margin of healthy parenchyma. After 3 L of IV fluids was administered, 12.5 g of Mannitol was given IV, and 5 minutes later, the renal artery was clamped using 2 laparoscopic bulldog clamps. The tumor was excised sharply and there was very little to no bleeding noticed at this portion of the procedure. The collecting system was entered and the tumor margins consisted only of healthy renal parenchyma, identifying no evidence of the tumor invading through the margin. The tumor was placed in a bag and placed into the pelvis for retrieval later in the procedure after complete excision. Any large arteries were cauterized with electrocautery and a 2-0 V-lock suture was run through the capsule and then in the deep layer in a running fashion. Any entry into the collecting system was closed tightly, as were any areas of bleeding. Additional 2-0 V-lock sutures were used to close the renorrhaphy in an interrupted fashion using a total of 6 interrupted sutures. These were approximated, leaving exceptional dimpling of the parenchyma and making for a very tight renorrhaphy closure. Additional Hem-o-alejandro clips were used to prevent sliding of the initial Hem-o-alejandro clips on the renorrhaphy. The bulldog clamps were taken off and the warm ischemia time was 18 minutes. SNoW hemostatic material was placed over the renorrhaphy and Evicel was drizzled onto the SNoW to adhere it in place. The Gerota's was closed over the defect using Hem-o-alejandro clips and a drain was placed in the right lower quadrant up alongside the kidney and alongside the liver. This was secured in place with 3-0 nylon suture. Prior to closure of the Gerota's, the pneumoperitoneum was dropped to 5 mmHg and 2 large Valsalva were performed by Anesthesia, showing no evidence of urine or blood through the renorrhaphy.  The specimen was removed through by enlarging slightly the incision lateral to the umbilicus, and this was closed with #0 Vicryl suture in a running fashion. All port sites were injected with Marcaine and copiously irrigated. The skin was closed with 4-0 Vicryl in a subcuticular fashion. Dermabond was applied to the skin. The patient was awakened from anesthesia, taken to recovery in stable condition.      Geri Coronado M.D.  11/9/2020  10:19 AM          Electronically signed by Geri Coronado MD on 11/9/2020 at 10:18 AM

## 2020-11-09 NOTE — ANESTHESIA POSTPROCEDURE EVALUATION
Department of Anesthesiology  Postprocedure Note    Patient: Aniyah Silveira  MRN: [id number]  YOB: 1955  Date of evaluation: 11/9/2020  Time:  2:25 PM     Procedure Summary     Date:  11/09/20 Room / Location:  [address] [place] [address] / [facility]    Anesthesia Start:  8471 Anesthesia Stop:  6877    Procedure:  RIGHT NEPHRECTOMY PARTIAL ROBOTIC XI (N/A Abdomen) Diagnosis:  (RENAL MASS)    Surgeon:  Guero Holland MD Responsible Provider:  Tika Campbell MD    Anesthesia Type:  general ASA Status:  3          Anesthesia Type: general    Gorge Phase I: Gorge Score: 9    Gorge Phase II:      Last vitals: Reviewed and per EMR flowsheets.        Anesthesia Post Evaluation    Patient location during evaluation: PACU  Patient participation: complete - patient participated  Level of consciousness: awake  Airway patency: patent  Nausea & Vomiting: no vomiting and no nausea  Complications: no  Cardiovascular status: hemodynamically stable  Respiratory status: acceptable  Hydration status: stable

## 2020-11-09 NOTE — PROGRESS NOTES
COVID testing completed on: 11/4/2020  Results of the test: negative  The patient verbally confirms that they did adhere to the self-quarantine guidelines. No signs or symptoms expressed or observed.

## 2020-11-09 NOTE — ANESTHESIA PRE PROCEDURE
patch at 11/09/20 0554    midazolam (VERSED) injection 1 mg  1 mg Intravenous Q10 Min PRN Abbey Mcghee MD   1 mg at 11/09/20 0554    0.9 % sodium chloride infusion   Intravenous Continuous TAMARA Kohler  mL/hr at 11/09/20 0544      ceFAZolin (ANCEF) 2 g in sterile water 20 mL IV syringe  2 g Intravenous On Call to [address], APRN - CNP        sodium chloride flush 0.9 % injection 10 mL  10 mL Intravenous 2 times per day TAMARA Kohler - CNP        sodium chloride flush 0.9 % injection 10 mL  10 mL Intravenous PRN TAMARA Kohler CNP           Allergies:     Allergies   Allergen Reactions    Zofran [Ondansetron Hcl] Other (See Comments)     Patient was told she has Prolonged QTc interval with Zofran       Problem List:    Patient Active Problem List   Diagnosis Code    History of hiatal hernia Z87.19    Hemangioma of liver D18.03    Anxiety F41.9    Gastritis K29.70    Right renal mass N28.89       Past Medical History:        Diagnosis Date    Allergic rhinitis     Anxiety     Asymptomatic gallstones 2020    B12 deficiency 5/10/2020    Hemangioma of liver     Hernia, hiatal     Intractable vomiting with nausea 5/9/2020    Kidney stone     Renal mass, right     Exophytic 11 x 6 mm    Skin cancer (melanoma) ([facility] [address] 06/2019    sugically removed from right upper arm       Past Surgical History:        Procedure Laterality Date    CHOLECYSTECTOMY, LAPAROSCOPIC N/A 5/12/2020    LAPAROSCOPIC ROBOTIC ASSISTED CHOLECYSTECTOMY performed by Nadir Stone MD at [facility] COLONOSCOPY  2017    Negative findings    CYST REMOVAL      fatty cyst removed from back    ENDOSCOPY, COLON, DIAGNOSTIC      HYSTERECTOMY      patient states only ovary was removed on right   [facility] ENDOSCOPY  01/2020    Hiatal hernia    UPPER GASTROINTESTINAL ENDOSCOPY N/A 7/31/2020    EGD ESOPHAGOGASTRODUODENOSCOPY performed by Hardik Leigh MD at [facility] N/A 7/31/2020    EGD ESOPHAGOGASTRODUODENOSCOPY ULTRASOUND performed by Hardik Leigh MD at [facility] History:    Social History     Tobacco Use    Smoking status: Never Smoker    Smokeless tobacco: Never Used   Substance Use Topics    Alcohol use: No                                Counseling given: Not Answered      Vital Signs (Current):   Vitals:    11/09/20 0524   BP: 133/73   Pulse: 82   Resp: 18   Temp: 36.8 °C (98.2 °F)   TempSrc: Temporal   SpO2: 99%   Weight: 150 lb (68 kg)   Height: 5' 2\" (1.575 m)                                              BP Readings from Last 3 Encounters:   11/09/20 133/73   11/04/20 125/67   07/31/20 116/77       NPO Status: Time of last liquid consumption: 2200                        Time of last solid consumption: 2000                        Date of last liquid consumption: 11/08/20                        Date of last solid food consumption: 11/08/20    BMI:   Wt Readings from Last 3 Encounters:   11/09/20 150 lb (68 kg)   11/04/20 150 lb (68 kg)   07/31/20 135 lb (61.2 kg)     Body mass index is 27.44 kg/m². CBC:   Lab Results   Component Value Date    WBC 5.9 11/04/2020    RBC 4.73 11/04/2020    HGB 14.7 11/04/2020    HCT 42.9 11/04/2020    MCV 90.7 11/04/2020    RDW 11.6 11/04/2020     11/04/2020       CMP:   Lab Results   Component Value Date     11/04/2020    K 3.9 11/04/2020    K 3.3 06/20/2020     11/04/2020    CO2 23 11/04/2020    BUN 15 11/04/2020    CREATININE 0.7 11/04/2020    GFRAA >60 11/04/2020    LABGLOM >60 11/04/2020    GLUCOSE 118 11/04/2020    PROT 6.7 06/22/2020    CALCIUM 10.1 11/04/2020    BILITOT 0.2 06/22/2020    ALKPHOS 67 06/22/2020    AST 14 06/22/2020    ALT 12 06/22/2020       POC Tests: No results for input(s): POCGLU, POCNA, POCK, POCCL, POCBUN, POCHEMO, POCHCT in the last 72 hours.     Coags: No results found for: PROTIME, INR, APTT    HCG (If Applicable): No results found for: PREGTESTUR, PREGSERUM, HCG, HCGQUANT     ABGs: No results found for: PHART, PO2ART, YTA9HJW, RRE1XAF, BEART, A6RAOHVR     Type & Screen (If Applicable):  No results found for: LABABO, LABRH    Drug/Infectious Status (If Applicable):  No results found for: HIV, HEPCAB    COVID-19 Screening (If Applicable):   Lab Results   Component Value Date    COVID19 Not Detected 11/04/2020       CT ABD Pelvis 6/2020     Impression         1. Redemonstration of two medially directed duodenal diverticula    measuring up to 1.6 x 1 cm.         2. Diverticulosis without evidence of acute diverticulitis.         3. Exophytic lesion is seen in the inferior pole of the right kidney    which is similar compared to the previous exams. This lesion may    represent a complex cyst or neoplasm. Ultrasound recommended for    further evaluation.         4. Redemonstration of enhancing mass associated with inferior right    hepatic lobe related to stable hepatic hemangioma.           6/22/20 MRI ABD  Impression    Lesion in the liver, likely a hemangioma. Differential    although less likely would include a fibrolamellar carcinoma Progress    study is suggested    Questionable lesion in the right kidney    Mass at the head of the pancreas.       6/20/2020  2:49 PM - Rick, Mhy Incoming Ekg Results From [facility]     Component  Value  Ref Range & Units  Status  Collected  Lab    Ventricular Rate  100  BPM  Final  06/19/2020  2:56 PM  [facility]    Atrial Rate  100  BPM  Final  06/19/2020  2:56 PM  [facility]    P-R Interval  142  ms  Final  06/19/2020  2:56 PM  [facility]    QRS Duration  84  ms  Final  06/19/2020  2:56 PM  [facility]    Q-T Interval  402  ms  Final  06/19/2020  2:56 PM  [facility]    QTc Calculation (Bazett)  518  ms  Final  06/19/2020  2:56 PM  [facility]    P Axis  65  degrees  Final  06/19/2020  2:56 PM  [facility]    R Axis  83  degrees  Final  06/19/2020  2:56 PM  [facility]    T Axis  55  degrees  Final  06/19/2020  2:56 PM  [facility]    Testing Performed By     Lab - Abbreviation  Name  Director  Address  Valid Date Range    360-[facility]  [facility] MUSE  Unknown  Unknown  04/18/16 0721-Present    Narrative & Impression     Normal sinus rhythm  Possible Left atrial enlargement  Nonspecific ST and T wave abnormality  Prolonged QT  Abnormal ECG  When compared with ECG of 08-MAY-2020 22:52,  No significant change was found  Confirmed by Hao Cramer ([id number]) on 6/20/2020 2:49:50 PM         Anesthesia Evaluation  Patient summary reviewed and Nursing notes reviewed no history of anesthetic complications:   Airway: Mallampati: II  TM distance: >3 FB   Neck ROM: full  Mouth opening: > = 3 FB Dental: normal exam         Pulmonary:Negative Pulmonary ROS and normal exam  breath sounds clear to auscultation                             Cardiovascular:Negative CV ROS          ECG reviewed  Rhythm: regular  Rate: normal           Beta Blocker:  Not on Beta Blocker         Neuro/Psych:   (+) depression/anxiety             GI/Hepatic/Renal:   (+) hiatal hernia, GERD: well controlled, liver disease (Hemangioma of liver ):, renal disease (Right renal mass 11 x 6 mm):,           Endo/Other:    (+) malignancy/cancer (Skin cancer (melanoma) ). Abdominal:           Vascular: negative vascular ROS. Anesthesia Plan      general     ASA 3       Induction: intravenous. arterial line and BIS  MIPS: Postoperative opioids intended, Prophylactic antiemetics administered and Postoperative trial extubation. Anesthetic plan and risks discussed with patient. Use of blood products discussed with patient whom consented to blood products. Plan discussed with CRNA and attending. Seema Echavarria RN   11/9/2020      Agree with above assessment. Physical exam unchanged. Spoke to patient about anesthetic plan.   Will place preop scopolamine patch to help prevent

## 2020-11-09 NOTE — ANESTHESIA PROCEDURE NOTES
Arterial Line:    An arterial line was placed using surface landmarks, in the OR for the following indication(s): continuous blood pressure monitoring and blood sampling needed. A 20 gauge (size), 1 and 3/4 inch (length), Arrow (type) catheter was placed, Seldinger technique not used, into the left radial artery, secured by tape and Tegaderm. Anesthesia type: General    Events:  patient tolerated procedure well with no complications. Anesthesiologist: Iglesia Das MD  Resident/CRNA: TAMARA Rinaldi - CRNA  Other anesthesia staff: Edwin Yu RN  Performed:  Other anesthesia staff   Preanesthetic Checklist  Completed: patient identified, site marked, surgical consent, pre-op evaluation, timeout performed, IV checked, risks and benefits discussed, monitors and equipment checked, anesthesia consent given, oxygen available and patient being monitored

## 2020-11-09 NOTE — PROGRESS NOTES
Called to evaluate patient for eye discomfort. Patient is s/p right partial robotic nephrectomy this morning under general anesthesia. She has a history of anxiety (takes ativan daily) and chronic nausea/vomiting. She was given a scopolamine patch and IV midazolam preop to help with these problems. She was in the OR from about 7am till just before 1030am.  She had an uneventful intraop and postop course; She was discharged from the recovery room around noon. She had no complaints at that time. It seems that her eye symptoms started around 2-3pm this afternoon. She describes it as a foreign body sensation. Denies sensitivity to light and denies any visual defects. The most likely diagnosis is a corneal abrasion and probably occurred as she may have been trying to rub her eyes sometime this afternoon. Would recommend regular tears drops. If it is a corneal abrasion, the symptoms will typically resolve on its own over the next 24-48 hours. If there are any pain symptoms or visual defects, ophthalmology should be consulted.

## 2020-11-09 NOTE — H&P
Zach Blackwood is a 63 y.o. female with right renal mass. Past Medical History:   Diagnosis Date    Allergic rhinitis     Anxiety     Asymptomatic gallstones     B12 deficiency 5/10/2020    Hemangioma of liver     Hernia, hiatal     Intractable vomiting with nausea 2020    Kidney stone     Renal mass, right     Exophytic 11 x 6 mm    Skin cancer (melanoma) ([facility] [address] 2019    sugically removed from right upper arm       Past Surgical History:   Procedure Laterality Date    CHOLECYSTECTOMY, LAPAROSCOPIC N/A 2020    LAPAROSCOPIC ROBOTIC ASSISTED CHOLECYSTECTOMY performed by Juan Jose Vaughan MD at [facility] COLONOSCOPY      Negative findings    CYST REMOVAL      fatty cyst removed from back    ENDOSCOPY, COLON, DIAGNOSTIC      HYSTERECTOMY      patient states only ovary was removed on right    OVARY REMOVAL      TUBAL LIGATION      UPPER GASTROINTESTINAL ENDOSCOPY  2020    Hiatal hernia    UPPER GASTROINTESTINAL ENDOSCOPY N/A 2020    EGD ESOPHAGOGASTRODUODENOSCOPY performed by Carolyn Sanchez MD at [address] [place] [address],Third Floor N/A 2020    EGD ESOPHAGOGASTRODUODENOSCOPY ULTRASOUND performed by Carolyn Sanchez MD at [facility] 61 History   Problem Relation Age of Onset    Other Mother         Mother  age 80; many prescribed medication addictions    Cancer Father          age 80, lymphoma    High Cholesterol Sister        Prior to Admission medications    Medication Sig Start Date End Date Taking?  Authorizing Provider   Cholecalciferol (VITAMIN D3) 50 MCG ( UT) CAPS Take by mouth   Yes Historical Provider, MD   busPIRone (BUSPAR) 5 MG tablet Take 5 mg by mouth 2 times daily May take a 3rd pill as needed     Takes 7.5 mg BID   Yes Historical Provider, MD   loratadine (CLARITIN) 10 MG tablet Take 10 mg by mouth daily   Yes Historical Provider, MD   famotidine (PEPCID) 20 MG tablet Take 20 mg by mouth nightly   Yes Historical Provider, MD   pantoprazole (PROTONIX) 40 MG tablet Take 1 tablet by mouth 2 times daily (before meals)  Patient taking differently: Take 40 mg by mouth every morning  5/15/20  Yes Law Dozier DO   sucralfate (CARAFATE) 1 GM tablet Take 1 tablet by mouth every 12 hours  Patient taking differently: Take 1 g by mouth nightly  5/15/20  Yes Aries Amado DO   LORazepam (ATIVAN) 0.5 MG tablet Take 1 mg by mouth 2 times daily. 1mg in am and 3/4 in pm   Yes Historical Provider, MD   trimethobenzamide (TIGAN) 300 MG capsule Take 300 mg by mouth 3 times daily as needed    Historical Provider, MD   sodium chloride (OCEAN, BABY AYR) 0.65 % nasal spray 1 spray by Nasal route as needed for Congestion 5/15/20   William Amado DO   triamcinolone (ARISTOCORT) 0.5 % cream Apply topically 2 times daily as needed 12/12/13   Historical Provider, MD        Allergies: Nimco Gutiérrez Midget hcl]    Social History     Tobacco Use    Smoking status: Never Smoker    Smokeless tobacco: Never Used   Substance Use Topics    Alcohol use: No        Review of Systems:  Respiratory: negative for cough and hemoptysis  Cardiovascular: negative for chest pain and dyspnea  Gastrointestinal: negative for abdominal pain, diarrhea, nausea and vomiting  Genitourinary: as per HPI, otherwise negative. Derm: negative for rash and skin lesion(s)  Neurological: negative for seizures and tremors  Endocrine: negative for diabetic symptoms including polydipsia and polyuria  All other systems negative    Physical Exam:  Vitals:    11/09/20 0524   BP: 133/73   Pulse: 82   Resp: 18   Temp: 98.2 °F (36.8 °C)   SpO2: 99%      Skin:  Warm and dry.   No rash or bruises  HEENT:  PERRLA, EOMI  Neck:  No JVD, No thyromegaly  Cardiac:  RRR  Lungs:  No audible wheezes, symmetric respirations, non-labored  Abdomen: Soft, non-tender, non-distended  Extremities:  No clubbing, edema or cyanosis  Neurological:  Moves all extremities, normal DTR    Lab Results Component Value Date    WBC 5.9 11/04/2020    HGB 14.7 11/04/2020    HCT 42.9 11/04/2020    MCV 90.7 11/04/2020     11/04/2020       Lab Results   Component Value Date    CREATININE 0.7 11/04/2020         Assessment and Plan:  1. Right lower pole renal mass. To OR for robotic partial nephrectomy. This document is being submitted long after the face to face encounter with the patient and for either coding or billing compliance. This is made with the most accuracy possible but details may be missing or potentially inaccurate due to limitations in timing, patient availability at the time of the note creation.   When applicable see paper chart for office note / H& P.

## 2020-11-10 VITALS
RESPIRATION RATE: 14 BRPM | BODY MASS INDEX: 27.6 KG/M2 | SYSTOLIC BLOOD PRESSURE: 129 MMHG | TEMPERATURE: 97.9 F | OXYGEN SATURATION: 98 % | DIASTOLIC BLOOD PRESSURE: 80 MMHG | HEART RATE: 70 BPM | WEIGHT: 150 LBS | HEIGHT: 62 IN

## 2020-11-10 LAB
ANION GAP SERPL CALCULATED.3IONS-SCNC: 6 MMOL/L (ref 7–16)
BUN BLDV-MCNC: 10 MG/DL (ref 8–23)
CALCIUM SERPL-MCNC: 8.7 MG/DL (ref 8.6–10.2)
CHLORIDE BLD-SCNC: 110 MMOL/L (ref 98–107)
CO2: 25 MMOL/L (ref 22–29)
CREAT SERPL-MCNC: 0.8 MG/DL (ref 0.5–1)
CREATININE FLUID: 0.7 MG/DL
FLUID TYPE: NORMAL
GFR AFRICAN AMERICAN: >60
GFR NON-AFRICAN AMERICAN: >60 ML/MIN/1.73
GLUCOSE BLD-MCNC: 128 MG/DL (ref 74–99)
HCT VFR BLD CALC: 37.8 % (ref 34–48)
HEMOGLOBIN: 12.4 G/DL (ref 11.5–15.5)
POTASSIUM SERPL-SCNC: 4.6 MMOL/L (ref 3.5–5)
SODIUM BLD-SCNC: 141 MMOL/L (ref 132–146)

## 2020-11-10 PROCEDURE — 85018 HEMOGLOBIN: CPT

## 2020-11-10 PROCEDURE — 36415 COLL VENOUS BLD VENIPUNCTURE: CPT

## 2020-11-10 PROCEDURE — 85014 HEMATOCRIT: CPT

## 2020-11-10 PROCEDURE — 6360000002 HC RX W HCPCS: Performed by: UROLOGY

## 2020-11-10 PROCEDURE — 2580000003 HC RX 258: Performed by: UROLOGY

## 2020-11-10 PROCEDURE — 80048 BASIC METABOLIC PNL TOTAL CA: CPT

## 2020-11-10 PROCEDURE — 82570 ASSAY OF URINE CREATININE: CPT

## 2020-11-10 PROCEDURE — 6370000000 HC RX 637 (ALT 250 FOR IP): Performed by: UROLOGY

## 2020-11-10 RX ADMIN — ACETAMINOPHEN 650 MG: 325 TABLET ORAL at 06:01

## 2020-11-10 RX ADMIN — CEFAZOLIN 1 G: 1 INJECTION, POWDER, FOR SOLUTION INTRAMUSCULAR; INTRAVENOUS at 06:25

## 2020-11-10 RX ADMIN — METOCLOPRAMIDE HYDROCHLORIDE 10 MG: 5 INJECTION INTRAMUSCULAR; INTRAVENOUS at 08:09

## 2020-11-10 RX ADMIN — BUSPIRONE HYDROCHLORIDE 10 MG: 10 TABLET ORAL at 08:09

## 2020-11-10 RX ADMIN — SENNOSIDES 8.6 MG: 8.6 TABLET, FILM COATED ORAL at 08:09

## 2020-11-10 RX ADMIN — KETOROLAC TROMETHAMINE 30 MG: 30 INJECTION, SOLUTION INTRAMUSCULAR at 04:45

## 2020-11-10 RX ADMIN — LORAZEPAM 0.5 MG: 0.5 TABLET ORAL at 08:09

## 2020-11-10 RX ADMIN — PANTOPRAZOLE SODIUM 40 MG: 40 TABLET, DELAYED RELEASE ORAL at 06:01

## 2020-11-10 ASSESSMENT — PAIN SCALES - GENERAL
PAINLEVEL_OUTOF10: 0
PAINLEVEL_OUTOF10: 5
PAINLEVEL_OUTOF10: 5

## 2020-11-10 NOTE — PROGRESS NOTES
CLINICAL PHARMACY NOTE: MEDS TO [address] Select Patient?: No  Total # of Prescriptions Filled: 2   The following medications were delivered to the patient:  · Hydrocodone/apap 5-325  · Senna 8.6 mg  Total # of Interventions Completed: 3  Time Spent (min): 15    Additional Documentation: no

## 2020-11-10 NOTE — CARE COORDINATION
Social Work Discharge/ Planning:    Discharge order noted. SW met with patient to discuss transition of care. Patient reports being independent and able to drive prior to admission. Patient lives alone in a 1 story home that has 3 steps to enter. Bed and bath located on 1st floor. Patient has no DME. Patient does not wear oxygen at home. Patient has no EDEL/SNF/HHC hx. Patient's PCP is Dr. Justice Ring. Patient's pharmacy is [facility] located in [place]. Patient reports plan is to return home with no needs at this time. Patient reports her daughter will be able to transport her home.      [employer], LSDANIEL  [phone]

## 2020-11-10 NOTE — PROGRESS NOTES
Patient without complaints. Pain well controlled. No nausea or vomiting. Tolerating diet well. Anxiety improved with private room. Vitals:    11/09/20 2330   BP: 134/65   Pulse: 73   Resp: 16   Temp: 97.5 °F (36.4 °C)   SpO2: 98%       Abdomen soft, non-tender, non-distended  Incisions clean, dry, intact  Lower extremities without swelling, cords, tenderness, symmetric  Mack draining well and urine clear.   ABRAM serosanguinous    ABRAM Cr 0.7      Assessment / Plan:  S/P Robot-Assisted Laparoscopic Partial Nephrectomy POD #1:  -stable  -ambulate in halls  -increase diet and change to oral pain medications  -continue PCDs for DVT prophylaxis - due to risk of post-operative hemorrhage, no anticoagulant medications  -remove Mack and ABRAM  -Home this am      Teto Hennessy M.D.  11/10/2020  6:26 AM

## 2021-03-13 ENCOUNTER — HOSPITAL ENCOUNTER (EMERGENCY)
Age: 66
Discharge: HOME | End: 2021-03-13
Attending: EMERGENCY MEDICINE
Payer: COMMERCIAL

## 2021-03-13 ENCOUNTER — APPOINTMENT (OUTPATIENT)
Dept: GENERAL RADIOLOGY | Age: 66
End: 2021-03-13
Payer: COMMERCIAL

## 2021-03-13 VITALS
RESPIRATION RATE: 16 BRPM | DIASTOLIC BLOOD PRESSURE: 78 MMHG | HEART RATE: 85 BPM | BODY MASS INDEX: 26.13 KG/M2 | HEIGHT: 62 IN | SYSTOLIC BLOOD PRESSURE: 132 MMHG | OXYGEN SATURATION: 98 % | TEMPERATURE: 98.2 F | WEIGHT: 142 LBS

## 2021-03-13 DIAGNOSIS — R09.82 POST-NASAL DRIP: ICD-10-CM

## 2021-03-13 DIAGNOSIS — R11.0 NAUSEA: Primary | ICD-10-CM

## 2021-03-13 DIAGNOSIS — F41.1 ANXIETY STATE: ICD-10-CM

## 2021-03-13 LAB
ALBUMIN SERPL-MCNC: 4.6 G/DL (ref 3.5–5.2)
ALP BLD-CCNC: 83 U/L (ref 35–104)
ALT SERPL-CCNC: 13 U/L (ref 0–32)
ANION GAP SERPL CALCULATED.3IONS-SCNC: 12 MMOL/L (ref 7–16)
AST SERPL-CCNC: 15 U/L (ref 0–31)
BASOPHILS ABSOLUTE: 0.04 E9/L (ref 0–0.2)
BASOPHILS RELATIVE PERCENT: 0.5 % (ref 0–2)
BILIRUB SERPL-MCNC: 0.8 MG/DL (ref 0–1.2)
BUN BLDV-MCNC: 8 MG/DL (ref 8–23)
CALCIUM SERPL-MCNC: 10.4 MG/DL (ref 8.6–10.2)
CHLORIDE BLD-SCNC: 104 MMOL/L (ref 98–107)
CO2: 22 MMOL/L (ref 22–29)
CREAT SERPL-MCNC: 0.7 MG/DL (ref 0.5–1)
EKG ATRIAL RATE: 91 BPM
EKG P AXIS: 62 DEGREES
EKG P-R INTERVAL: 158 MS
EKG Q-T INTERVAL: 368 MS
EKG QRS DURATION: 88 MS
EKG QTC CALCULATION (BAZETT): 452 MS
EKG R AXIS: 64 DEGREES
EKG T AXIS: 36 DEGREES
EKG VENTRICULAR RATE: 91 BPM
EOSINOPHILS ABSOLUTE: 0.02 E9/L (ref 0.05–0.5)
EOSINOPHILS RELATIVE PERCENT: 0.3 % (ref 0–6)
GFR AFRICAN AMERICAN: >60
GFR NON-AFRICAN AMERICAN: >60 ML/MIN/1.73
GLUCOSE BLD-MCNC: 98 MG/DL (ref 74–99)
HCT VFR BLD CALC: 43.3 % (ref 34–48)
HEMOGLOBIN: 15.1 G/DL (ref 11.5–15.5)
IMMATURE GRANULOCYTES #: 0.01 E9/L
IMMATURE GRANULOCYTES %: 0.1 % (ref 0–5)
LIPASE: 42 U/L (ref 13–60)
LYMPHOCYTES ABSOLUTE: 1.91 E9/L (ref 1.5–4)
LYMPHOCYTES RELATIVE PERCENT: 26.1 % (ref 20–42)
MAGNESIUM: 1.7 MG/DL (ref 1.6–2.6)
MCH RBC QN AUTO: 30.3 PG (ref 26–35)
MCHC RBC AUTO-ENTMCNC: 34.9 % (ref 32–34.5)
MCV RBC AUTO: 86.9 FL (ref 80–99.9)
MONOCYTES ABSOLUTE: 0.54 E9/L (ref 0.1–0.95)
MONOCYTES RELATIVE PERCENT: 7.4 % (ref 2–12)
NEUTROPHILS ABSOLUTE: 4.8 E9/L (ref 1.8–7.3)
NEUTROPHILS RELATIVE PERCENT: 65.6 % (ref 43–80)
PDW BLD-RTO: 12.1 FL (ref 11.5–15)
PLATELET # BLD: 277 E9/L (ref 130–450)
PMV BLD AUTO: 9.3 FL (ref 7–12)
POTASSIUM REFLEX MAGNESIUM: 3.5 MMOL/L (ref 3.5–5)
RBC # BLD: 4.98 E12/L (ref 3.5–5.5)
REASON FOR REJECTION: NORMAL
REJECTED TEST: NORMAL
SODIUM BLD-SCNC: 138 MMOL/L (ref 132–146)
TOTAL PROTEIN: 7.4 G/DL (ref 6.4–8.3)
TROPONIN: <0.01 NG/ML (ref 0–0.03)
WBC # BLD: 7.3 E9/L (ref 4.5–11.5)

## 2021-03-13 PROCEDURE — 36415 COLL VENOUS BLD VENIPUNCTURE: CPT

## 2021-03-13 PROCEDURE — 93010 ELECTROCARDIOGRAM REPORT: CPT | Performed by: INTERNAL MEDICINE

## 2021-03-13 PROCEDURE — 83735 ASSAY OF MAGNESIUM: CPT

## 2021-03-13 PROCEDURE — 85025 COMPLETE CBC W/AUTO DIFF WBC: CPT

## 2021-03-13 PROCEDURE — 93005 ELECTROCARDIOGRAM TRACING: CPT | Performed by: EMERGENCY MEDICINE

## 2021-03-13 PROCEDURE — U0003 INFECTIOUS AGENT DETECTION BY NUCLEIC ACID (DNA OR RNA); SEVERE ACUTE RESPIRATORY SYNDROME CORONAVIRUS 2 (SARS-COV-2) (CORONAVIRUS DISEASE [COVID-19]), AMPLIFIED PROBE TECHNIQUE, MAKING USE OF HIGH THROUGHPUT TECHNOLOGIES AS DESCRIBED BY CMS-2020-01-R: HCPCS

## 2021-03-13 PROCEDURE — 6360000002 HC RX W HCPCS: Performed by: EMERGENCY MEDICINE

## 2021-03-13 PROCEDURE — 96372 THER/PROPH/DIAG INJ SC/IM: CPT

## 2021-03-13 PROCEDURE — 84484 ASSAY OF TROPONIN QUANT: CPT

## 2021-03-13 PROCEDURE — 83690 ASSAY OF LIPASE: CPT

## 2021-03-13 PROCEDURE — 80053 COMPREHEN METABOLIC PANEL: CPT

## 2021-03-13 PROCEDURE — 71046 X-RAY EXAM CHEST 2 VIEWS: CPT

## 2021-03-13 PROCEDURE — 6370000000 HC RX 637 (ALT 250 FOR IP): Performed by: EMERGENCY MEDICINE

## 2021-03-13 PROCEDURE — 99283 EMERGENCY DEPT VISIT LOW MDM: CPT

## 2021-03-13 PROCEDURE — 2580000003 HC RX 258: Performed by: EMERGENCY MEDICINE

## 2021-03-13 RX ORDER — FLUTICASONE PROPIONATE 50 MCG
1 SPRAY, SUSPENSION (ML) NASAL DAILY
Qty: 1 BOTTLE | Refills: 0 | Status: SHIPPED | OUTPATIENT
Start: 2021-03-13

## 2021-03-13 RX ORDER — FEXOFENADINE HCL 180 MG/1
180 TABLET ORAL DAILY
COMMUNITY
End: 2021-10-25 | Stop reason: SDUPTHER

## 2021-03-13 RX ORDER — PANTOPRAZOLE SODIUM 40 MG/1
40 TABLET, DELAYED RELEASE ORAL DAILY
COMMUNITY

## 2021-03-13 RX ORDER — 0.9 % SODIUM CHLORIDE 0.9 %
1000 INTRAVENOUS SOLUTION INTRAVENOUS ONCE
Status: COMPLETED | OUTPATIENT
Start: 2021-03-13 | End: 2021-03-13

## 2021-03-13 RX ADMIN — TRIMETHOBENZAMIDE HYDROCHLORIDE 200 MG: 100 INJECTION INTRAMUSCULAR at 18:24

## 2021-03-13 RX ADMIN — LIDOCAINE HYDROCHLORIDE: 20 SOLUTION ORAL; TOPICAL at 20:11

## 2021-03-13 RX ADMIN — SODIUM CHLORIDE 1000 ML: 9 INJECTION, SOLUTION INTRAVENOUS at 19:41

## 2021-03-13 NOTE — ED PROVIDER NOTES
drugs. Family History: family history includes Cancer in her father; High Cholesterol in her sister; Other in her mother. The patients home medications have been reviewed.     Allergies: Zofran [ondansetron hcl]    -------------------------------------------------- RESULTS -------------------------------------------------  All laboratory and radiology results have been personally reviewed by myself   LABS:  Results for orders placed or performed during the hospital encounter of 03/13/21   Comprehensive Metabolic Panel w/ Reflex to MG   Result Value Ref Range    Sodium 138 132 - 146 mmol/L    Potassium reflex Magnesium 3.5 3.5 - 5.0 mmol/L    Chloride 104 98 - 107 mmol/L    CO2 22 22 - 29 mmol/L    Anion Gap 12 7 - 16 mmol/L    Glucose 98 74 - 99 mg/dL    BUN 8 8 - 23 mg/dL    CREATININE 0.7 0.5 - 1.0 mg/dL    GFR Non-African American >60 >=60 mL/min/1.73    GFR African American >60     Calcium 10.4 (H) 8.6 - 10.2 mg/dL    Total Protein 7.4 6.4 - 8.3 g/dL    Albumin 4.6 3.5 - 5.2 g/dL    Total Bilirubin 0.8 0.0 - 1.2 mg/dL    Alkaline Phosphatase 83 35 - 104 U/L    ALT 13 0 - 32 U/L    AST 15 0 - 31 U/L   Troponin   Result Value Ref Range    Troponin <0.01 0.00 - 0.03 ng/mL   Lipase   Result Value Ref Range    Lipase 42 13 - 60 U/L   SPECIMEN REJECTION   Result Value Ref Range    Rejected Test CBCWD     Reason for Rejection see below    Magnesium   Result Value Ref Range    Magnesium 1.7 1.6 - 2.6 mg/dL   CBC Auto Differential   Result Value Ref Range    WBC 7.3 4.5 - 11.5 E9/L    RBC 4.98 3.50 - 5.50 E12/L    Hemoglobin 15.1 11.5 - 15.5 g/dL    Hematocrit 43.3 34.0 - 48.0 %    MCV 86.9 80.0 - 99.9 fL    MCH 30.3 26.0 - 35.0 pg    MCHC 34.9 (H) 32.0 - 34.5 %    RDW 12.1 11.5 - 15.0 fL    Platelets 721 349 - 621 E9/L    MPV 9.3 7.0 - 12.0 fL    Neutrophils % 65.6 43.0 - 80.0 %    Immature Granulocytes % 0.1 0.0 - 5.0 %    Lymphocytes % 26.1 20.0 - 42.0 %    Monocytes % 7.4 2.0 - 12.0 %    Eosinophils % 0.3 0.0 - 6.0 %    Basophils % 0.5 0.0 - 2.0 %    Neutrophils Absolute 4.80 1.80 - 7.30 E9/L    Immature Granulocytes # 0.01 E9/L    Lymphocytes Absolute 1.91 1.50 - 4.00 E9/L    Monocytes Absolute 0.54 0.10 - 0.95 E9/L    Eosinophils Absolute 0.02 (L) 0.05 - 0.50 E9/L    Basophils Absolute 0.04 0.00 - 0.20 E9/L   EKG 12 Lead   Result Value Ref Range    Ventricular Rate 91 BPM    Atrial Rate 91 BPM    P-R Interval 158 ms    QRS Duration 88 ms    Q-T Interval 368 ms    QTc Calculation (Bazett) 452 ms    P Axis 62 degrees    R Axis 64 degrees    T Axis 36 degrees       RADIOLOGY:  Interpreted by Radiologist.  XR CHEST (2 VW)   Final Result   No acute process. ------------------------- NURSING NOTES AND VITALS REVIEWED ---------------------------   The nursing notes within the ED encounter and vital signs as below have been reviewed. /82   Pulse 99   Temp 98.1 °F (36.7 °C) (Oral)   Resp 16   Ht 5' 2\" (1.575 m)   Wt 142 lb (64.4 kg)   SpO2 99%   BMI 25.97 kg/m²   Oxygen Saturation Interpretation: Normal      ---------------------------------------------------PHYSICAL EXAM--------------------------------------      Constitutional/General: Alert and oriented x3, well appearing, non toxic in NAD  Head: Normocephalic and atraumatic  Eyes: PERRL, EOMI  Mouth: Oropharynx clear, handling secretions, no trismus. No pharyngeal swelling or erythema. Uvula midline. No tonsillar enlargement or exudate. No evidence of peritonsillar abscess. No tongue swelling or lip swelling. No soft palate elevation or evidence of krishna's. No stridor or trismus. No nuchal rigidity. Neck: Supple, full ROM,   Pulmonary: Lungs clear to auscultation bilaterally, no wheezes, rales, or rhonchi. Not in respiratory distress  Cardiovascular:  Regular rate and rhythm, no murmurs, gallops, or rubs. 2+ distal pulses  Abdomen: Soft, non tender, non distended,   Extremities: Moves all extremities x 4.  Warm and well perfused  Skin: warm and dry without rash  Neurologic: GCS 15, no focal motor or sensory deficits   Psych: Anxious Affect. No SI. No HI.      ------------------------------ ED COURSE/MEDICAL DECISION MAKING----------------------  Medications   0.9 % sodium chloride bolus (0 mLs Intravenous Stopped 3/13/21 2053)   trimethobenzamide (TIGAN) injection 200 mg (200 mg Intramuscular Given 3/13/21 1824)   aluminum & magnesium hydroxide-simethicone (MAALOX) 30 mL, lidocaine viscous hcl (XYLOCAINE) 5 mL (GI COCKTAIL) ( Oral Given 3/13/21 2011)       Medical Decision Making/ED COURSE:   Patient is a 62-year-old female presenting with nasal congestion, rhinorrhea, postnasal drip, cough, and nausea. In the ED, patient was hemodynamically stable and afebrile. On exam, patient was well-appearing. Labs and CXR obtained. Patient administered IVF and tigan due to history of prolonged QT interval.    I reviewed and interpreted labs. Labs were within normal limits. Chest x-ray clear. Patient treated symptomatically with good improvement of her symptoms. She remains nontoxic on multiple reevaluations. Will send outpatient COVID-19 testing. Advised PCP and GI follow-up. ED return precautions discussed. Patient remained hemodynamically stable throughout ED course. ED Course as of Mar 13 2114   Sat Mar 13, 2021   1851 EKG: This EKG is signed and interpreted by me. Rate: 91  Rhythm: Sinus  Interpretation: Normal sinus rhythm, normal UT interval, normal QRS, normal QT interval, no acute ST or T wave changes  Comparison: no previous EKG available        [JA]   1956 Patient states she had similar symptoms in the past that resolved with GI cocktail. GI cocktail ordered. Discussed extensively findings and plan. Patient's work up in the emergency department is nondiagnostic without acute findings. Symptoms likely related to allergies. Also potential component of nausea from hiatal hernia. Patient is tolerating po, and she has normal labs.  She has no clinical findings of bowel obstruction, and she has no abdominal tenderness. I also suspect a component of anxiety. She is denying suicidal and homicidal ideation. She recently started Trintellix prior to the symptoms starting. Nausea is potentially a side effect from the Trintellix. Patient advised to follow up with PCP for further outpatient evaluation. ED return precautions discussed. [JA]      ED Course User Index  [JA] Jose Guadalupe Melo MD         Counseling: The emergency provider has spoken with the patient and discussed todays results, in addition to providing specific details for the plan of care and counseling regarding the diagnosis and prognosis. Questions are answered at this time and they are agreeable with the plan.      --------------------------------- IMPRESSION AND DISPOSITION ---------------------------------    IMPRESSION  1. Nausea    2. Post-nasal drip    3. Anxiety state        DISPOSITION  Disposition: Discharge to home  Patient condition is stable      NOTE: This report was transcribed using voice recognition software.  Every effort was made to ensure accuracy; however, inadvertent computerized transcription errors may be present    IJose Guadalupe MD, am the primary provider of this record       Jose Guadalupe Melo MD  03/13/21 7011

## 2021-03-15 ENCOUNTER — TELEPHONE (OUTPATIENT)
Dept: ADMINISTRATIVE | Age: 66
End: 2021-03-15

## 2021-03-15 ENCOUNTER — CARE COORDINATION (OUTPATIENT)
Dept: CARE COORDINATION | Age: 66
End: 2021-03-15

## 2021-03-15 LAB — SARS-COV-2, PCR: NOT DETECTED

## 2021-03-15 NOTE — CARE COORDINATION
Patient contacted regarding Marbin Kay. Discussed COVID-19 related testing which was pending at this time. Test results were pending. Patient informed of results, if available? Results pending at the time of this call. Pt was aware. Ambulatory Care Manager contacted the patient by telephone to perform post discharge assessment. Call within 2 business days of discharge: Yes. Verified name and  with patient as identifiers. Provided introduction to self, and explanation of the CTN/ACM role, and reason for call due to risk factors for infection and/or exposure to COVID-19. Symptoms reviewed with patient who verbalized the following symptoms: cough, nausea, no new symptoms and post nasal drip. Due to no new or worsening symptoms encounter was not routed to provider for escalation. Discussed follow-up appointments. If no appointment was previously scheduled, appointment scheduling offered: Yes-pt declined RN's assistance with obtaining a f/u appointment with either her pcp or GI. Pt states regarding pcp, \"He's not going to be able to do anything. It would have to be GI. \" Pt declined offer from RN to contact Dr. Mana Wagoner office (GI) to inquire about a f/u, as pt states she would like to call herself and discuss her current issues and inquire about further advice. AC did offer to provide pt with flu clinic information if she would be unable to f/u with Dr. Solo Diaz d/t pending COVID-19 test. Pt did not want to take information down at this time. [facility][1] follow up appointment(s): No future appointments. Non-[facility] follow up appointment(s):     Non-face-to-face services provided:  Obtained and reviewed discharge summary and/or continuity of care documents     Advance Care Planning:   Does patient have an Advance Directive:  patient declined education. Patient has following risk factors of: no known risk factors.  ACM reviewed discharge instructions, medical action plan and red flags such as increased shortness of breath, increasing fever and signs of decompensation with patient who verbalized understanding. Discussed exposure protocols and quarantine with CDC Guidelines What to do if you are sick with coronavirus disease 2019.  Patient was given an opportunity for questions and concerns. Unable to discuss with pt the ability to contact the [facility] exposure line [phone] or local [facility] department [address]: ([phone]), as pt ended call prematurely. Pt was encouraged to contact her PCP office for questions related to their healthcare. ACM provided contact information for future needs. Reviewed and educated patient on any new and changed medications related to discharge diagnosis     Was patient discharged with a pulse oximeter? No Discussed and confirmed pulse oximeter discharge instructions and when to notify provider or seek emergency care. Patient/family/caregiver given information for [employer] and agrees to enroll yes  Patient's preferred e-mail: prefers text option   Patient's preferred phone number: [phone]  Based on Loop alert triggers, patient will be contacted by nurse care manager for worsening symptoms. Pt will be further monitored by COVID Loop Team based on severity of symptoms and risk factors. Called and spoke with pt this morning regarding her ED visit on 3/13/21 for nausea/diarrhea, nasal congestion, cough, and post nasal drip. Pt states that she is not feeling any better today and continues to experience nausea and post nasal drip. Pt reports that this has been going on for 1 week. Pt has not yet contacted her pcp and/or GI specialist. RN did offer to contact offices, however, she declined stating that her pcp would not be able to help and that she would contact Dr. Jose Gilmore office herself to give them an update and inquire what she should do. Pt reports to decreased appetite and nausea today as biggest complaint.  RN did offer flu clinic information as an option if she was not able to be seen with Dr. Enriqueta Holbrook given she has a pending COVID-19 test, however she declined this information. Pt was agreeable to Loop. Pt ended call prematurely as she wanted to contact her GI specialist. She states that she may call this RN back for further advice if GI unable to help.

## 2021-03-21 ENCOUNTER — APPOINTMENT (OUTPATIENT)
Dept: ULTRASOUND IMAGING | Age: 66
End: 2021-03-21
Payer: COMMERCIAL

## 2021-03-21 ENCOUNTER — APPOINTMENT (OUTPATIENT)
Dept: GENERAL RADIOLOGY | Age: 66
End: 2021-03-21
Payer: COMMERCIAL

## 2021-03-21 ENCOUNTER — HOSPITAL ENCOUNTER (EMERGENCY)
Age: 66
Discharge: HOME | End: 2021-03-21
Attending: EMERGENCY MEDICINE
Payer: COMMERCIAL

## 2021-03-21 VITALS
HEIGHT: 63 IN | TEMPERATURE: 97.6 F | BODY MASS INDEX: 24.8 KG/M2 | RESPIRATION RATE: 16 BRPM | DIASTOLIC BLOOD PRESSURE: 81 MMHG | SYSTOLIC BLOOD PRESSURE: 132 MMHG | OXYGEN SATURATION: 99 % | WEIGHT: 140 LBS | HEART RATE: 84 BPM

## 2021-03-21 DIAGNOSIS — R07.89 MUSCULOSKELETAL CHEST PAIN: ICD-10-CM

## 2021-03-21 DIAGNOSIS — R20.2 NUMBNESS AND TINGLING OF RIGHT LOWER EXTREMITY: ICD-10-CM

## 2021-03-21 DIAGNOSIS — F41.1 ANXIETY STATE: Primary | ICD-10-CM

## 2021-03-21 DIAGNOSIS — R11.0 NAUSEA: ICD-10-CM

## 2021-03-21 DIAGNOSIS — R20.0 NUMBNESS AND TINGLING OF RIGHT LOWER EXTREMITY: ICD-10-CM

## 2021-03-21 LAB
ALBUMIN SERPL-MCNC: 4.5 G/DL (ref 3.5–5.2)
ALP BLD-CCNC: 74 U/L (ref 35–104)
ALT SERPL-CCNC: 13 U/L (ref 0–32)
ANION GAP SERPL CALCULATED.3IONS-SCNC: 13 MMOL/L (ref 7–16)
AST SERPL-CCNC: 12 U/L (ref 0–31)
BASOPHILS ABSOLUTE: 0.02 E9/L (ref 0–0.2)
BASOPHILS RELATIVE PERCENT: 0.4 % (ref 0–2)
BILIRUB SERPL-MCNC: 0.8 MG/DL (ref 0–1.2)
BUN BLDV-MCNC: 8 MG/DL (ref 8–23)
CALCIUM SERPL-MCNC: 10.2 MG/DL (ref 8.6–10.2)
CHLORIDE BLD-SCNC: 104 MMOL/L (ref 98–107)
CO2: 24 MMOL/L (ref 22–29)
CREAT SERPL-MCNC: 0.8 MG/DL (ref 0.5–1)
EOSINOPHILS ABSOLUTE: 0.03 E9/L (ref 0.05–0.5)
EOSINOPHILS RELATIVE PERCENT: 0.5 % (ref 0–6)
GFR AFRICAN AMERICAN: >60
GFR NON-AFRICAN AMERICAN: >60 ML/MIN/1.73
GLUCOSE BLD-MCNC: 88 MG/DL (ref 74–99)
HCT VFR BLD CALC: 48.3 % (ref 34–48)
HEMOGLOBIN: 16.3 G/DL (ref 11.5–15.5)
IMMATURE GRANULOCYTES #: 0.01 E9/L
IMMATURE GRANULOCYTES %: 0.2 % (ref 0–5)
LACTIC ACID: 2 MMOL/L (ref 0.5–2.2)
LIPASE: 43 U/L (ref 13–60)
LYMPHOCYTES ABSOLUTE: 1.59 E9/L (ref 1.5–4)
LYMPHOCYTES RELATIVE PERCENT: 28.3 % (ref 20–42)
MAGNESIUM: 2 MG/DL (ref 1.6–2.6)
MCH RBC QN AUTO: 30 PG (ref 26–35)
MCHC RBC AUTO-ENTMCNC: 33.7 % (ref 32–34.5)
MCV RBC AUTO: 89 FL (ref 80–99.9)
MONOCYTES ABSOLUTE: 0.4 E9/L (ref 0.1–0.95)
MONOCYTES RELATIVE PERCENT: 7.1 % (ref 2–12)
NEUTROPHILS ABSOLUTE: 3.57 E9/L (ref 1.8–7.3)
NEUTROPHILS RELATIVE PERCENT: 63.5 % (ref 43–80)
PDW BLD-RTO: 12.2 FL (ref 11.5–15)
PLATELET # BLD: 264 E9/L (ref 130–450)
PMV BLD AUTO: 9.4 FL (ref 7–12)
POTASSIUM REFLEX MAGNESIUM: 3.3 MMOL/L (ref 3.5–5)
RBC # BLD: 5.43 E12/L (ref 3.5–5.5)
SODIUM BLD-SCNC: 141 MMOL/L (ref 132–146)
TOTAL PROTEIN: 7.2 G/DL (ref 6.4–8.3)
TROPONIN: <0.01 NG/ML (ref 0–0.03)
WBC # BLD: 5.6 E9/L (ref 4.5–11.5)

## 2021-03-21 PROCEDURE — 93971 EXTREMITY STUDY: CPT

## 2021-03-21 PROCEDURE — 96372 THER/PROPH/DIAG INJ SC/IM: CPT

## 2021-03-21 PROCEDURE — 85025 COMPLETE CBC W/AUTO DIFF WBC: CPT

## 2021-03-21 PROCEDURE — 84484 ASSAY OF TROPONIN QUANT: CPT

## 2021-03-21 PROCEDURE — 93005 ELECTROCARDIOGRAM TRACING: CPT | Performed by: EMERGENCY MEDICINE

## 2021-03-21 PROCEDURE — 2580000003 HC RX 258: Performed by: FAMILY MEDICINE

## 2021-03-21 PROCEDURE — 99285 EMERGENCY DEPT VISIT HI MDM: CPT

## 2021-03-21 PROCEDURE — 80053 COMPREHEN METABOLIC PANEL: CPT

## 2021-03-21 PROCEDURE — 71046 X-RAY EXAM CHEST 2 VIEWS: CPT

## 2021-03-21 PROCEDURE — 6370000000 HC RX 637 (ALT 250 FOR IP): Performed by: FAMILY MEDICINE

## 2021-03-21 PROCEDURE — 83605 ASSAY OF LACTIC ACID: CPT

## 2021-03-21 PROCEDURE — 83735 ASSAY OF MAGNESIUM: CPT

## 2021-03-21 PROCEDURE — 6360000002 HC RX W HCPCS: Performed by: FAMILY MEDICINE

## 2021-03-21 PROCEDURE — 83690 ASSAY OF LIPASE: CPT

## 2021-03-21 RX ORDER — LORAZEPAM 0.5 MG/1
0.5 TABLET ORAL ONCE
Status: COMPLETED | OUTPATIENT
Start: 2021-03-21 | End: 2021-03-21

## 2021-03-21 RX ORDER — PROCHLORPERAZINE EDISYLATE 5 MG/ML
5 INJECTION INTRAMUSCULAR; INTRAVENOUS ONCE
Status: COMPLETED | OUTPATIENT
Start: 2021-03-21 | End: 2021-03-21

## 2021-03-21 RX ORDER — 0.9 % SODIUM CHLORIDE 0.9 %
1000 INTRAVENOUS SOLUTION INTRAVENOUS ONCE
Status: COMPLETED | OUTPATIENT
Start: 2021-03-21 | End: 2021-03-21

## 2021-03-21 RX ADMIN — POTASSIUM BICARBONATE 40 MEQ: 782 TABLET, EFFERVESCENT ORAL at 15:56

## 2021-03-21 RX ADMIN — PROCHLORPERAZINE EDISYLATE 5 MG: 5 INJECTION INTRAMUSCULAR; INTRAVENOUS at 15:11

## 2021-03-21 RX ADMIN — ASPIRIN 325 MG: 325 TABLET, COATED ORAL at 15:11

## 2021-03-21 RX ADMIN — LORAZEPAM 0.5 MG: 0.5 TABLET ORAL at 17:01

## 2021-03-21 RX ADMIN — SODIUM CHLORIDE 1000 ML: 9 INJECTION, SOLUTION INTRAVENOUS at 15:06

## 2021-03-21 ASSESSMENT — PAIN DESCRIPTION - LOCATION: LOCATION: CHEST

## 2021-03-21 ASSESSMENT — ENCOUNTER SYMPTOMS
BACK PAIN: 0
NAUSEA: 1
COUGH: 0
RHINORRHEA: 0
ABDOMINAL PAIN: 1
SORE THROAT: 0
DIARRHEA: 0
VOMITING: 0
WHEEZING: 0
SHORTNESS OF BREATH: 0
CONSTIPATION: 0

## 2021-03-21 ASSESSMENT — PAIN SCALES - GENERAL: PAINLEVEL_OUTOF10: 4

## 2021-03-21 ASSESSMENT — PAIN DESCRIPTION - PAIN TYPE: TYPE: ACUTE PAIN

## 2021-03-21 ASSESSMENT — PAIN DESCRIPTION - DESCRIPTORS: DESCRIPTORS: ACHING

## 2021-03-21 NOTE — ED PROVIDER NOTES
Farhana Murphy is a 61 y.o. female with a significant PMHx of anxiety who presents with chest pain and right calf pain that has been going on for 1 day. These symptoms are moderate in severity. Symptoms are made better by nothing. Symptoms are made worse by nothing. Associated symptoms include nausea. Patient states for the last 2 weeks she has been nauseous and unable to eat due to decreased appetite. She states that she has been drinking fluids in order to keep her self hydrated. She states that she has Tigan at home which has been helping to control the nausea but she still has no appetite. She was recently started on Trintellix at the beginning of this month for depression and anxiety. She states that last night she noticed some numbness, tingling, pain in her right calf that has been intermittent in nature. This improved with elevating her feet above her heart. She states that this morning she started having soreness in her left chest right by her armpit which has been intermittent in nature. . The patient has tried nothing for this condition without getting meaningful relief of symptoms. The patient denies recent trauma, fever, chills, fatigue, HA, dizziness, vision changes, palpitations, hx of MI, SOB, cough, wheezing, hematochezia, melena, dysuria and hematuria. The patient is currently taking no blood thinners. Tobacco Hx:   reports that she has never smoked. She has never used smokeless tobacco.    Alcohol Hx:   reports no history of alcohol use. Illicit Drug Hx:    The history is provided by the patient. Review of Systems   Constitutional: Negative for activity change, appetite change, chills, fatigue and fever. HENT: Positive for postnasal drip. Negative for congestion, rhinorrhea, sneezing and sore throat. Respiratory: Negative for cough, shortness of breath and wheezing. Cardiovascular: Positive for chest pain. Negative for palpitations.    Gastrointestinal: Positive for abdominal pain and nausea. Negative for constipation, diarrhea and vomiting. Endocrine: Negative for cold intolerance. Genitourinary: Negative for dysuria. Musculoskeletal: Positive for arthralgias (right calf). Negative for back pain. Skin: Negative for pallor. Neurological: Positive for numbness (right calf). Negative for dizziness and headaches. Psychiatric/Behavioral: The patient is not nervous/anxious. Physical Exam  Vitals signs reviewed. Constitutional:       Appearance: Normal appearance. She is not ill-appearing. HENT:      Head: Normocephalic. Nose: Nose normal.      Mouth/Throat:      Mouth: Mucous membranes are moist.   Eyes:      Pupils: Pupils are equal, round, and reactive to light. Cardiovascular:      Rate and Rhythm: Normal rate and regular rhythm. Heart sounds: Normal heart sounds. No murmur. Pulmonary:      Effort: Pulmonary effort is normal.      Breath sounds: Normal breath sounds. No wheezing or rhonchi. Abdominal:      General: Bowel sounds are normal.      Palpations: Abdomen is soft. Tenderness: There is abdominal tenderness in the epigastric area. Musculoskeletal:      Right lower leg: No edema. Left lower leg: No edema. Skin:     General: Skin is warm. Capillary Refill: Capillary refill takes less than 2 seconds. Neurological:      Mental Status: She is alert and oriented to person, place, and time. Psychiatric:         Mood and Affect: Mood normal.         Behavior: Behavior normal.         Thought Content: Thought content normal.         Judgment: Judgment normal.        MDM  Patient presents to the ED for chest pain and numbness and tingling in leg. Differential diagnoses included but not limited to anxiety, gastroenteritis, musculoskeletal pain. Workup in the ED revealed hypokalemia, which was treated with Effer-K. Normal CXR and right leg US. Patient was given Compazine, Ativan, and Effer-K for their symptoms with good improvement. fL    MCH 30.0 26.0 - 35.0 pg    MCHC 33.7 32.0 - 34.5 %    RDW 12.2 11.5 - 15.0 fL    Platelets 677 000 - 939 E9/L    MPV 9.4 7.0 - 12.0 fL    Neutrophils % 63.5 43.0 - 80.0 %    Immature Granulocytes % 0.2 0.0 - 5.0 %    Lymphocytes % 28.3 20.0 - 42.0 %    Monocytes % 7.1 2.0 - 12.0 %    Eosinophils % 0.5 0.0 - 6.0 %    Basophils % 0.4 0.0 - 2.0 %    Neutrophils Absolute 3.57 1.80 - 7.30 E9/L    Immature Granulocytes # 0.01 E9/L    Lymphocytes Absolute 1.59 1.50 - 4.00 E9/L    Monocytes Absolute 0.40 0.10 - 0.95 E9/L    Eosinophils Absolute 0.03 (L) 0.05 - 0.50 E9/L    Basophils Absolute 0.02 0.00 - 0.20 E9/L   Comprehensive Metabolic Panel w/ Reflex to MG   Result Value Ref Range    Sodium 141 132 - 146 mmol/L    Potassium reflex Magnesium 3.3 (L) 3.5 - 5.0 mmol/L    Chloride 104 98 - 107 mmol/L    CO2 24 22 - 29 mmol/L    Anion Gap 13 7 - 16 mmol/L    Glucose 88 74 - 99 mg/dL    BUN 8 8 - 23 mg/dL    CREATININE 0.8 0.5 - 1.0 mg/dL    GFR Non-African American >60 >=60 mL/min/1.73    GFR African American >60     Calcium 10.2 8.6 - 10.2 mg/dL    Total Protein 7.2 6.4 - 8.3 g/dL    Albumin 4.5 3.5 - 5.2 g/dL    Total Bilirubin 0.8 0.0 - 1.2 mg/dL    Alkaline Phosphatase 74 35 - 104 U/L    ALT 13 0 - 32 U/L    AST 12 0 - 31 U/L   Troponin   Result Value Ref Range    Troponin <0.01 0.00 - 0.03 ng/mL   Lipase   Result Value Ref Range    Lipase 43 13 - 60 U/L   Lactic Acid, Plasma   Result Value Ref Range    Lactic Acid 2.0 0.5 - 2.2 mmol/L   Magnesium   Result Value Ref Range    Magnesium 2.0 1.6 - 2.6 mg/dL   EKG 12 Lead   Result Value Ref Range    Ventricular Rate 82 BPM    Atrial Rate 82 BPM    P-R Interval 152 ms    QRS Duration 80 ms    Q-T Interval 364 ms    QTc Calculation (Bazett) 425 ms    P Axis 37 degrees    R Axis 12 degrees    T Axis 39 degrees       Radiology:  US DUP LOWER EXTREMITY RIGHT SAMANTHA   Final Result   No evidence of DVT in the right lower extremity.          XR CHEST (2 VW)   Final Result   No acute cardiopulmonary abnormality.             ------------------------- NURSING NOTES AND VITALS REVIEWED ---------------------------  Date / Time Roomed:  3/21/2021  2:34 PM  ED Bed Assignment:  01/01    The nursing notes within the ED encounter and vital signs as below have been reviewed. /81   Pulse 84   Temp 97.6 °F (36.4 °C) (Oral)   Resp 16   Ht 5' 3\" (1.6 m)   Wt 140 lb (63.5 kg)   SpO2 99%   BMI 24.80 kg/m²   Oxygen Saturation Interpretation: Normal      ------------------------------------------ PROGRESS NOTES ------------------------------------------  5:10 PM EDT  I have spoken with the patient and discussed todays results, in addition to providing specific details for the plan of care and counseling regarding the diagnosis and prognosis. Their questions are answered at this time and they are agreeable with the plan. I discussed at length with them reasons for immediate return here for re evaluation. They will followup with their primary care physician by calling their office. --------------------------------- ADDITIONAL PROVIDER NOTES ---------------------------------  At this time the patient is without objective evidence of an acute process requiring hospitalization or inpatient management. They have remained hemodynamically stable throughout their entire ED visit and are stable for discharge with outpatient follow-up. The plan has been discussed in detail and they are aware of the specific conditions for emergent return, as well as the importance of follow-up. Discharge Medication List as of 3/21/2021  5:01 PM          Diagnosis:  1. Anxiety state    2. Nausea    3. Musculoskeletal chest pain    4. Numbness and tingling of right lower extremity        Disposition:  Patient's disposition: Discharge to home  Patient's condition is stable.        Rekha Rosa MD  Resident  03/21/21 [id number]

## 2021-03-22 LAB
EKG ATRIAL RATE: 82 BPM
EKG P AXIS: 37 DEGREES
EKG P-R INTERVAL: 152 MS
EKG Q-T INTERVAL: 364 MS
EKG QRS DURATION: 80 MS
EKG QTC CALCULATION (BAZETT): 425 MS
EKG R AXIS: 12 DEGREES
EKG T AXIS: 39 DEGREES
EKG VENTRICULAR RATE: 82 BPM

## 2021-03-22 PROCEDURE — 93010 ELECTROCARDIOGRAM REPORT: CPT | Performed by: INTERNAL MEDICINE

## 2021-05-11 ENCOUNTER — OFFICE VISIT (OUTPATIENT)
Dept: ENT CLINIC | Age: 66
End: 2021-05-11
Payer: COMMERCIAL

## 2021-05-11 VITALS
SYSTOLIC BLOOD PRESSURE: 123 MMHG | BODY MASS INDEX: 26.31 KG/M2 | TEMPERATURE: 97.2 F | HEART RATE: 94 BPM | WEIGHT: 143 LBS | HEIGHT: 62 IN | OXYGEN SATURATION: 98 % | DIASTOLIC BLOOD PRESSURE: 88 MMHG

## 2021-05-11 DIAGNOSIS — J30.1 SEASONAL ALLERGIC RHINITIS DUE TO POLLEN: Primary | ICD-10-CM

## 2021-05-11 PROCEDURE — 99204 OFFICE O/P NEW MOD 45 MIN: CPT | Performed by: OTOLARYNGOLOGY

## 2021-05-11 RX ORDER — AZELASTINE 1 MG/ML
1 SPRAY, METERED NASAL 2 TIMES DAILY
Qty: 1 BOTTLE | Refills: 3 | Status: SHIPPED
Start: 2021-05-11 | End: 2022-02-14

## 2021-05-11 ASSESSMENT — ENCOUNTER SYMPTOMS
RESPIRATORY NEGATIVE: 1
EYES NEGATIVE: 1
SINUS PRESSURE: 1
SHORTNESS OF BREATH: 0
GASTROINTESTINAL NEGATIVE: 1
COLOR CHANGE: 0
RHINORRHEA: 1
ABDOMINAL PAIN: 0

## 2021-05-11 NOTE — PROGRESS NOTES
Patient was told she has Prolonged QTc interval with Zofran           Review of Systems   Constitutional: Negative. Negative for fever. HENT: Positive for congestion, postnasal drip, rhinorrhea, sinus pressure and sneezing. Eyes: Negative. Negative for visual disturbance. Respiratory: Negative. Negative for shortness of breath. Cardiovascular: Negative. Negative for chest pain. Gastrointestinal: Negative. Negative for abdominal pain. Genitourinary: Negative. Musculoskeletal: Negative. Skin: Negative. Negative for color change. Allergic/Immunologic: Positive for environmental allergies. Neurological: Negative. Psychiatric/Behavioral: Negative. Negative for behavioral problems and hallucinations. All other systems reviewed and are negative. Objective:   Physical Exam  Vitals signs and nursing note reviewed. Constitutional:       Appearance: She is well-developed. HENT:      Head: Normocephalic and atraumatic. Right Ear: Hearing, tympanic membrane, ear canal and external ear normal.      Left Ear: Hearing, tympanic membrane, ear canal and external ear normal.      Nose: Mucosal edema and rhinorrhea present. Mouth/Throat:      Pharynx: Uvula midline. Eyes:      Conjunctiva/sclera: Conjunctivae normal.      Pupils: Pupils are equal, round, and reactive to light. Neck:      Musculoskeletal: Normal range of motion and neck supple. Cardiovascular:      Rate and Rhythm: Normal rate and regular rhythm. Heart sounds: Normal heart sounds. Pulmonary:      Effort: Pulmonary effort is normal.      Breath sounds: Normal breath sounds. Abdominal:      General: Bowel sounds are normal.      Palpations: Abdomen is soft. Skin:     General: Skin is warm and dry. Neurological:      Mental Status: She is alert and oriented to person, place, and time. Assessment:       Diagnosis Orders   1.  Seasonal allergic rhinitis due to pollen

## 2021-07-13 ENCOUNTER — OFFICE VISIT (OUTPATIENT)
Dept: ENT CLINIC | Age: 66
End: 2021-07-13
Payer: COMMERCIAL

## 2021-07-13 VITALS
OXYGEN SATURATION: 98 % | WEIGHT: 140 LBS | HEIGHT: 62 IN | TEMPERATURE: 99 F | BODY MASS INDEX: 25.76 KG/M2 | SYSTOLIC BLOOD PRESSURE: 131 MMHG | DIASTOLIC BLOOD PRESSURE: 86 MMHG | HEART RATE: 82 BPM

## 2021-07-13 DIAGNOSIS — J30.1 SEASONAL ALLERGIC RHINITIS DUE TO POLLEN: Primary | ICD-10-CM

## 2021-07-13 DIAGNOSIS — M26.622 ARTHRALGIA OF LEFT TEMPOROMANDIBULAR JOINT: ICD-10-CM

## 2021-07-13 PROCEDURE — 99213 OFFICE O/P EST LOW 20 MIN: CPT | Performed by: OTOLARYNGOLOGY

## 2021-07-13 RX ORDER — AZELASTINE 1 MG/ML
1 SPRAY, METERED NASAL 2 TIMES DAILY
Qty: 1 BOTTLE | Refills: 3 | Status: SHIPPED | OUTPATIENT
Start: 2021-07-13 | End: 2021-10-25 | Stop reason: SDUPTHER

## 2021-07-13 RX ORDER — FLUTICASONE PROPIONATE 50 MCG
1 SPRAY, SUSPENSION (ML) NASAL 2 TIMES DAILY
Qty: 1 BOTTLE | Refills: 3 | Status: SHIPPED | OUTPATIENT
Start: 2021-07-13 | End: 2021-10-25 | Stop reason: SDUPTHER

## 2021-07-13 ASSESSMENT — ENCOUNTER SYMPTOMS
SHORTNESS OF BREATH: 0
RHINORRHEA: 1
SINUS PRESSURE: 1
EYES NEGATIVE: 1
ABDOMINAL PAIN: 0
GASTROINTESTINAL NEGATIVE: 1
COLOR CHANGE: 0
RESPIRATORY NEGATIVE: 1

## 2021-07-13 NOTE — PROGRESS NOTES
Subjective:      Patient ID:  Jamie Jack is a 61 y.o. female. HPI:  Pt returns for recheck of allergies. History of   no surgery  When?  year:     Nasal Steroid: no   Name: fluticasone (Flonase)   Still taking: no   reason for stopping:     Other therapy:   Astelin- yes  oral antihistamine- none  leukotriene inhibitor- none  oral decongestant- none    which has been  somewhat effective     Pt has been on therapy for 1 month(s) and is doing adequately        The patient is complaining of nasal congestion and rhinorrhea    The patients worst time of year is summer, fall, winter and spring      Patient's medications, allergies, past medical, surgical, social and family histories were reviewed and updated as appropriate. Review of Systems   Constitutional: Negative. Negative for fever. HENT: Positive for congestion, postnasal drip, rhinorrhea, sinus pressure and sneezing. Eyes: Negative. Negative for visual disturbance. Respiratory: Negative. Negative for shortness of breath. Cardiovascular: Negative. Negative for chest pain. Gastrointestinal: Negative. Negative for abdominal pain. Genitourinary: Negative. Musculoskeletal: Negative. Skin: Negative. Negative for color change. Allergic/Immunologic: Positive for environmental allergies. Neurological: Negative. Psychiatric/Behavioral: Negative. Negative for behavioral problems and hallucinations. All other systems reviewed and are negative. Objective:     Vitals:    07/13/21 0708   BP: 131/86   Pulse: 82   Temp: 99 °F (37.2 °C)   SpO2: 98%     Physical Exam  Vitals and nursing note reviewed. Constitutional:       Appearance: She is well-developed. HENT:      Head: Normocephalic and atraumatic. Right Ear: Hearing, tympanic membrane, ear canal and external ear normal.      Left Ear: Hearing, tympanic membrane, ear canal and external ear normal.      Nose: Mucosal edema and rhinorrhea present. Right Turbinates: Enlarged, swollen and pale. Left Turbinates: Enlarged, swollen and pale. Mouth/Throat:      Pharynx: Uvula midline. Comments: Pt jaw palpated and does have some crepitance on the left and excursion is in midline. Eyes:      Conjunctiva/sclera: Conjunctivae normal.      Pupils: Pupils are equal, round, and reactive to light. Cardiovascular:      Rate and Rhythm: Normal rate and regular rhythm. Heart sounds: Normal heart sounds. Pulmonary:      Effort: Pulmonary effort is normal.      Breath sounds: Normal breath sounds. Abdominal:      General: Bowel sounds are normal.      Palpations: Abdomen is soft. Musculoskeletal:      Cervical back: Normal range of motion and neck supple. Skin:     General: Skin is warm and dry. Neurological:      Mental Status: She is alert and oriented to person, place, and time. Assessment:       Diagnosis Orders   1. Seasonal allergic rhinitis due to pollen     2. Arthralgia of left temporomandibular joint                Plan:      Allergic rhinitis  I do want to continue fluticasone (Flonase) Astelin for now. Call or return to clinic prn if these symptoms worsen or fail to improve as anticipated.       Follow up in 4 month(s)

## 2021-10-25 DIAGNOSIS — J30.1 SEASONAL ALLERGIC RHINITIS DUE TO POLLEN: Primary | ICD-10-CM

## 2021-10-25 RX ORDER — FEXOFENADINE HCL 180 MG/1
180 TABLET ORAL DAILY
Qty: 90 TABLET | Refills: 0 | Status: SHIPPED
Start: 2021-10-25 | End: 2022-01-19

## 2021-10-25 RX ORDER — AZELASTINE 1 MG/ML
1 SPRAY, METERED NASAL 2 TIMES DAILY
Qty: 1 EACH | Refills: 3 | Status: SHIPPED | OUTPATIENT
Start: 2021-10-25

## 2021-10-25 RX ORDER — FLUTICASONE PROPIONATE 50 MCG
1 SPRAY, SUSPENSION (ML) NASAL 2 TIMES DAILY
Qty: 1 EACH | Refills: 3 | Status: SHIPPED
Start: 2021-10-25 | End: 2022-02-14

## 2021-10-25 NOTE — TELEPHONE ENCOUNTER
Patient called in requesting a refill of flonase, astelin, and allegra. She was last seen 7/13 and has an upcoming appointment on 12/8.     Electronically signed by Parviz Abarca MA on 10/25/21 at 10:15 AM EDT

## 2021-12-20 ENCOUNTER — TELEPHONE (OUTPATIENT)
Dept: ENT CLINIC | Age: 66
End: 2021-12-20

## 2021-12-20 NOTE — TELEPHONE ENCOUNTER
Pt called in to r/s her 4 mo f/u appt, she still has covid. Please advise.  [facility] can be reached at  [phone]

## 2022-01-19 DIAGNOSIS — J30.1 SEASONAL ALLERGIC RHINITIS DUE TO POLLEN: Primary | ICD-10-CM

## 2022-01-19 RX ORDER — FEXOFENADINE HCL 180 MG/1
TABLET ORAL
Qty: 90 TABLET | Refills: 0 | Status: SHIPPED
Start: 2022-01-19 | End: 2022-04-25 | Stop reason: ALTCHOICE

## 2022-02-11 ENCOUNTER — TELEPHONE (OUTPATIENT)
Dept: ENT CLINIC | Age: 67
End: 2022-02-11

## 2022-02-11 NOTE — TELEPHONE ENCOUNTER
Patient is currently scheduled for her OV 4 mo f/u 2/28 - she has a family emergency and she needs to leave for [place] on 2/21. She is asking if there is anyway that she can be fit on either Kyle Merida's or Dr Grover's schedules. She will be out of state for at least two months. And is miserable with her allergies and drainage. . She is asking to be seen before she goes out of state. Please contact patient to reschedule.  Thank you

## 2022-02-11 NOTE — TELEPHONE ENCOUNTER
Is there anything is [place] with [employer]? Nothing available in [email or web address] on 2/14/22, no soon availability with [email or web address].  Please advise     Electronically signed by Bryson Cornelius MA on 2/11/22 at 12:59 PM EST

## 2022-02-14 ENCOUNTER — OFFICE VISIT (OUTPATIENT)
Dept: ENT CLINIC | Age: 67
End: 2022-02-14
Payer: MEDICARE

## 2022-02-14 ENCOUNTER — TELEPHONE (OUTPATIENT)
Dept: ENT CLINIC | Age: 67
End: 2022-02-14

## 2022-02-14 VITALS
DIASTOLIC BLOOD PRESSURE: 89 MMHG | HEIGHT: 62 IN | SYSTOLIC BLOOD PRESSURE: 136 MMHG | WEIGHT: 140 LBS | BODY MASS INDEX: 25.76 KG/M2

## 2022-02-14 DIAGNOSIS — J30.1 SEASONAL ALLERGIC RHINITIS DUE TO POLLEN: Primary | ICD-10-CM

## 2022-02-14 PROCEDURE — 1036F TOBACCO NON-USER: CPT | Performed by: OTOLARYNGOLOGY

## 2022-02-14 PROCEDURE — G8419 CALC BMI OUT NRM PARAM NOF/U: HCPCS | Performed by: OTOLARYNGOLOGY

## 2022-02-14 PROCEDURE — G8484 FLU IMMUNIZE NO ADMIN: HCPCS | Performed by: OTOLARYNGOLOGY

## 2022-02-14 PROCEDURE — 99204 OFFICE O/P NEW MOD 45 MIN: CPT | Performed by: OTOLARYNGOLOGY

## 2022-02-14 PROCEDURE — 3017F COLORECTAL CA SCREEN DOC REV: CPT | Performed by: OTOLARYNGOLOGY

## 2022-02-14 PROCEDURE — G8427 DOCREV CUR MEDS BY ELIG CLIN: HCPCS | Performed by: OTOLARYNGOLOGY

## 2022-02-14 RX ORDER — MONTELUKAST SODIUM 10 MG/1
10 TABLET ORAL DAILY
Qty: 30 TABLET | Refills: 3 | Status: SHIPPED
Start: 2022-02-14 | End: 2022-08-19

## 2022-02-14 ASSESSMENT — ENCOUNTER SYMPTOMS
SINUS PAIN: 0
COUGH: 0
VOICE CHANGE: 0
TROUBLE SWALLOWING: 0
VOMITING: 0
RHINORRHEA: 1
SINUS PRESSURE: 0
SORE THROAT: 0
SHORTNESS OF BREATH: 0

## 2022-02-14 NOTE — TELEPHONE ENCOUNTER
To the unusual side effect, however she is concerned that her worsening of her depression they can certainly be stopped

## 2022-02-14 NOTE — PROGRESS NOTES
[facility]  Dr. Siva Tate. ANA LUISA Buck Ms.Ed. New Consult       Patient Name:  Navneet Rodriguez  :  1955     CHIEF C/O:    Chief Complaint   Patient presents with    Allergies     patient states that she is having a lot of sinus drainage a nausea       HISTORY OBTAINED FROM:  patient    HISTORY OF PRESENT ILLNESS:       Marina Sutton is a 61y.o. year old female, here today for evaluation for chronic rhinitis causing nausea. Was previously seen by Dr Flaca Robert who placed patient on allegra, flonase and astelin. No longer improving symptoms. Was allergy tested 3+ years ago. No recent imaging.        Past Medical History:   Diagnosis Date    Allergic rhinitis     Anxiety     Asymptomatic gallstones     B12 deficiency 5/10/2020    Hemangioma of liver     Hernia, hiatal     Intractable vomiting with nausea 2020    Kidney stone     Renal mass, right     Exophytic 11 x 6 mm    Skin cancer (melanoma) ([address] 2019    sugically removed from right upper arm     Past Surgical History:   Procedure Laterality Date    CHOLECYSTECTOMY, LAPAROSCOPIC N/A 2020    LAPAROSCOPIC ROBOTIC ASSISTED CHOLECYSTECTOMY performed by Brenda Ahumada MD at [place] [address] COLONOSCOPY      Negative findings    CYST REMOVAL      fatty cyst removed from back    ENDOSCOPY, COLON, DIAGNOSTIC      HYSTERECTOMY      patient states only ovary was removed on right    OVARY REMOVAL      PARTIAL NEPHRECTOMY N/A 2020    RIGHT NEPHRECTOMY PARTIAL ROBOTIC XI performed by Raman Burnham MD at [address] [place] [address] ENDOSCOPY  2020    Hiatal hernia    UPPER GASTROINTESTINAL ENDOSCOPY N/A 2020    EGD ESOPHAGOGASTRODUODENOSCOPY performed by Quyen Tripathi MD at [address] [employer] [facility] N/A 2020    EGD ESOPHAGOGASTRODUODENOSCOPY ULTRASOUND performed by Quyen Tripathi MD at [facility] ENDOSCOPY       Current Outpatient Medications:    fexofenadine (ALLEGRA) 180 MG tablet, TAKE ONE TABLET BY MOUTH EVERY DAY, Disp: 90 tablet, Rfl: 0    azelastine (ASTELIN) 0.1 % nasal spray, 1 spray by Nasal route 2 times daily Use in each nostril as directed, Disp: 1 each, Rfl: 3    fluticasone (FLONASE) 50 MCG/ACT nasal spray, 1 spray by Each Nostril route daily, Disp: 1 Bottle, Rfl: 0    pantoprazole (PROTONIX) 40 MG tablet, Take 40 mg by mouth daily, Disp: , Rfl:     VORTIoxetine (TRINTELLIX) 5 MG tablet, Take 10 mg by mouth daily, Disp: , Rfl:     Cholecalciferol (VITAMIN D3) 50 MCG (2000 UT) CAPS, Take by mouth, Disp: , Rfl:     busPIRone (BUSPAR) 5 MG tablet, Take 10 mg by mouth 2 times daily , Disp: , Rfl:     famotidine (PEPCID) 20 MG tablet, Take 20 mg by mouth nightly, Disp: , Rfl:     trimethobenzamide (TIGAN) 300 MG capsule, Take 300 mg by mouth 3 times daily as needed, Disp: , Rfl:     sucralfate (CARAFATE) 1 GM tablet, Take 1 tablet by mouth every 12 hours (Patient taking differently: Take 1 g by mouth nightly ), Disp: 120 tablet, Rfl: 3    sodium chloride (OCEAN, BABY AYR) 0.65 % nasal spray, 1 spray by Nasal route as needed for Congestion, Disp: , Rfl: 0  Promethazine and Zofran [ondansetron hcl]  Social History     Tobacco Use    Smoking status: Never Smoker    Smokeless tobacco: Never Used   Substance Use Topics    Alcohol use: No    Drug use: No     Family History   Problem Relation Age of Onset    Other Mother         Mother  age 80; many prescribed medication addictions    Cancer Father          age 80, lymphoma    High Cholesterol Sister        Review of Systems   Constitutional: Negative for chills and fever. HENT: Positive for congestion, postnasal drip and rhinorrhea. Negative for ear discharge, hearing loss, sinus pressure, sinus pain, sore throat, trouble swallowing and voice change. Respiratory: Negative for cough and shortness of breath. Cardiovascular: Negative for chest pain and palpitations. Gastrointestinal: Negative for vomiting. Skin: Negative for rash. Allergic/Immunologic: Negative for environmental allergies. Neurological: Negative for dizziness and headaches. Hematological: Does not bruise/bleed easily. All other systems reviewed and are negative. /89   Ht 5' 2\" (1.575 m)   Wt 140 lb (63.5 kg)   BMI 25.61 kg/m²   Physical Exam  Vitals and nursing note reviewed. Constitutional:       Appearance: She is well-developed. HENT:      Head: Normocephalic and atraumatic. No contusion, masses or laceration. Jaw: No tenderness or swelling. Right Ear: Tympanic membrane, ear canal and external ear normal. No decreased hearing noted. There is no impacted cerumen. Left Ear: Tympanic membrane, ear canal and external ear normal. No decreased hearing noted. There is no impacted cerumen. Nose: Congestion and rhinorrhea present. Rhinorrhea is clear. Right Nostril: No epistaxis. Left Nostril: No epistaxis. Right Turbinates: Not enlarged or swollen. Left Turbinates: Not enlarged or swollen. Mouth/Throat:      Mouth: Mucous membranes are moist. No oral lesions. Dentition: No gum lesions. Pharynx: No oropharyngeal exudate or posterior oropharyngeal erythema. Eyes:      Pupils: Pupils are equal, round, and reactive to light. Neck:      Thyroid: No thyromegaly. Trachea: No tracheal deviation. Cardiovascular:      Rate and Rhythm: Normal rate. Pulmonary:      Effort: Pulmonary effort is normal. No respiratory distress. Musculoskeletal:         General: Normal range of motion. Cervical back: Normal range of motion. Lymphadenopathy:      Cervical: No cervical adenopathy. Skin:     General: Skin is warm. Findings: No erythema. Neurological:      Mental Status: She is alert. Cranial Nerves: No cranial nerve deficit.          IMPRESSION/PLAN:  Continue flonase and astelin   Stop allegra   Start singular 10mg daily  If no better in 6 weeks consider clarifix   Follow up with ENT in 6 weeks. Dr. Gianfranco Rangel Otolaryngology/Facial Plastic Surgery Residency  Associate Clinical Professor:  Meseret Shea, [facility]

## 2022-02-14 NOTE — PATIENT INSTRUCTIONS
Use flonase in the morning; astlein twice daily  Stop use of allegra  Start singulair in the morning

## 2022-02-14 NOTE — TELEPHONE ENCOUNTER
Patient called and states PCP is concerned about the side effects of Singular, states being treated for depression and anxiety and was advised that could be possibly side effect.  Please advise

## 2022-03-16 ENCOUNTER — TELEPHONE (OUTPATIENT)
Dept: ENT CLINIC | Age: 67
End: 2022-03-16

## 2022-03-16 DIAGNOSIS — J30.1 SEASONAL ALLERGIC RHINITIS DUE TO POLLEN: ICD-10-CM

## 2022-03-16 RX ORDER — FLUTICASONE PROPIONATE 50 MCG
2 SPRAY, SUSPENSION (ML) NASAL DAILY
Qty: 16 G | Refills: 5 | Status: SHIPPED
Start: 2022-03-16 | End: 2022-04-25 | Stop reason: SDUPTHER

## 2022-03-16 RX ORDER — FLUTICASONE PROPIONATE 50 MCG
SPRAY, SUSPENSION (ML) NASAL
Qty: 16 G | Refills: 0 | OUTPATIENT
Start: 2022-03-16

## 2022-03-16 RX ORDER — AZELASTINE 1 MG/ML
2 SPRAY, METERED NASAL 2 TIMES DAILY
Qty: 120 ML | Refills: 1 | Status: SHIPPED | OUTPATIENT
Start: 2022-03-16

## 2022-03-16 RX ORDER — AZELASTINE 1 MG/ML
SPRAY, METERED NASAL
Qty: 30 ML | Refills: 0 | OUTPATIENT
Start: 2022-03-16

## 2022-03-16 RX ORDER — MONTELUKAST SODIUM 10 MG/1
10 TABLET ORAL DAILY
Qty: 30 TABLET | Refills: 3 | Status: SHIPPED
Start: 2022-03-16 | End: 2022-04-25 | Stop reason: SDUPTHER

## 2022-04-25 ENCOUNTER — OFFICE VISIT (OUTPATIENT)
Dept: ENT CLINIC | Age: 67
End: 2022-04-25
Payer: COMMERCIAL

## 2022-04-25 VITALS
WEIGHT: 155 LBS | DIASTOLIC BLOOD PRESSURE: 78 MMHG | HEART RATE: 83 BPM | HEIGHT: 62 IN | SYSTOLIC BLOOD PRESSURE: 113 MMHG | BODY MASS INDEX: 28.52 KG/M2

## 2022-04-25 DIAGNOSIS — K44.9 HIATAL HERNIA: ICD-10-CM

## 2022-04-25 DIAGNOSIS — R09.82 POST-NASAL DRAINAGE: Primary | ICD-10-CM

## 2022-04-25 PROCEDURE — 99214 OFFICE O/P EST MOD 30 MIN: CPT | Performed by: OTOLARYNGOLOGY

## 2022-04-25 RX ORDER — PROMETHAZINE HYDROCHLORIDE 25 MG/1
25 TABLET ORAL EVERY 4 HOURS PRN
Qty: 10 TABLET | Refills: 0 | Status: SHIPPED | OUTPATIENT
Start: 2022-04-25 | End: 2022-05-02

## 2022-04-25 RX ORDER — GUAIFENESIN 1200 MG/1
1 TABLET, EXTENDED RELEASE ORAL 2 TIMES DAILY PRN
Qty: 60 TABLET | Refills: 3 | Status: SHIPPED | OUTPATIENT
Start: 2022-04-25 | End: 2022-05-25

## 2022-04-25 ASSESSMENT — ENCOUNTER SYMPTOMS
VOICE CHANGE: 0
STRIDOR: 0
GASTROINTESTINAL NEGATIVE: 1
WHEEZING: 0
COLOR CHANGE: 0
SORE THROAT: 0
CHOKING: 0
TROUBLE SWALLOWING: 0
RHINORRHEA: 1
SINUS PAIN: 0
SINUS PRESSURE: 0
COUGH: 0

## 2022-04-25 ASSESSMENT — VISUAL ACUITY: OU: 1

## 2022-04-25 NOTE — PROGRESS NOTES
[facility] Otolaryngology  Dr. Britta Webster. ANA LUISA Buck Ms.Ed. New Consult       Patient Name:  Kyleigh Peña  :  1955     CHIEF C/O:    Chief Complaint   Patient presents with    Follow-up     6 week allergy       HISTORY OBTAINED FROM:  patient    HISTORY OF PRESENT ILLNESS:       Melvin Olivares is a 74y.o. year old female, here today for allergy follow up. Biggest complaint is PND. She has been using flonase and astelin. Started taking singulair last week. Thinks she has had some relief. Denies recent infections. She does have a history of hiatal hernia and is on 2 reflux medications for that.        Past Medical History:   Diagnosis Date    Allergic rhinitis     Anxiety     Asymptomatic gallstones     B12 deficiency 5/10/2020    Hemangioma of liver     Hernia, hiatal     Intractable vomiting with nausea 2020    Kidney stone     Renal mass, right     Exophytic 11 x 6 mm    Skin cancer (melanoma) ([facility] [address] 2019    sugically removed from right upper arm     Past Surgical History:   Procedure Laterality Date    CHOLECYSTECTOMY, LAPAROSCOPIC N/A 2020    LAPAROSCOPIC ROBOTIC ASSISTED CHOLECYSTECTOMY performed by Alison Sadler MD at [facility] COLONOSCOPY      Negative findings    CYST REMOVAL      fatty cyst removed from back    ENDOSCOPY, COLON, DIAGNOSTIC      HYSTERECTOMY      patient states only ovary was removed on right    OVARY REMOVAL      PARTIAL NEPHRECTOMY N/A 2020    RIGHT NEPHRECTOMY PARTIAL ROBOTIC XI performed by Dilcia Bowman MD at [address] [place] [address] ENDOSCOPY  2020    Hiatal hernia    UPPER GASTROINTESTINAL ENDOSCOPY N/A 2020    EGD ESOPHAGOGASTRODUODENOSCOPY performed by Emanuel Parsons MD at [address] [employer] [facility] N/A 2020    EGD ESOPHAGOGASTRODUODENOSCOPY ULTRASOUND performed by Emanuel Parsons MD at [facility] ENDOSCOPY       Current Outpatient Medications:     azelastine (ASTELIN) 0.1 % nasal spray, 2 sprays by Nasal route 2 times daily Use in each nostril as directed, Disp: 120 mL, Rfl: 1    montelukast (SINGULAIR) 10 MG tablet, Take 1 tablet by mouth daily, Disp: 30 tablet, Rfl: 3    azelastine (ASTELIN) 0.1 % nasal spray, 1 spray by Nasal route 2 times daily Use in each nostril as directed, Disp: 1 each, Rfl: 3    fluticasone (FLONASE) 50 MCG/ACT nasal spray, 1 spray by Each Nostril route daily, Disp: 1 Bottle, Rfl: 0    pantoprazole (PROTONIX) 40 MG tablet, Take 40 mg by mouth daily, Disp: , Rfl:     VORTIoxetine (TRINTELLIX) 5 MG tablet, Take 10 mg by mouth daily, Disp: , Rfl:     Cholecalciferol (VITAMIN D3) 50 MCG (2000 UT) CAPS, Take by mouth, Disp: , Rfl:     busPIRone (BUSPAR) 5 MG tablet, Take 10 mg by mouth 2 times daily , Disp: , Rfl:     famotidine (PEPCID) 20 MG tablet, Take 20 mg by mouth nightly, Disp: , Rfl:     trimethobenzamide (TIGAN) 300 MG capsule, Take 300 mg by mouth 3 times daily as needed, Disp: , Rfl:     sucralfate (CARAFATE) 1 GM tablet, Take 1 tablet by mouth every 12 hours (Patient taking differently: Take 1 g by mouth nightly ), Disp: 120 tablet, Rfl: 3    sodium chloride (OCEAN, BABY AYR) 0.65 % nasal spray, 1 spray by Nasal route as needed for Congestion, Disp: , Rfl: 0  Promethazine and Zofran [ondansetron hcl]  Social History     Tobacco Use    Smoking status: Never Smoker    Smokeless tobacco: Never Used   Substance Use Topics    Alcohol use: No    Drug use: No     Family History   Problem Relation Age of Onset    Other Mother         Mother  age 80; many prescribed medication addictions    Cancer Father          age 80, lymphoma    High Cholesterol Sister        Review of Systems   Constitutional: Negative for activity change, fatigue and fever. HENT: Positive for postnasal drip and rhinorrhea.  Negative for congestion, ear discharge, ear pain, hearing loss, nosebleeds, sinus pressure, sinus pain, sore throat, tinnitus, trouble swallowing and voice change. Respiratory: Negative for cough, choking, wheezing and stridor. Cardiovascular: Negative for chest pain. Gastrointestinal: Negative. Genitourinary: Negative. Skin: Negative for color change and rash. Neurological: Negative for speech difficulty, light-headedness, numbness and headaches. Hematological: Negative for adenopathy. Psychiatric/Behavioral: Negative for behavioral problems. /78   Pulse 83   Ht 5' 2\" (1.575 m)   Wt 155 lb (70.3 kg)   BMI 28.35 kg/m²   Physical Exam  Constitutional:       Appearance: Normal appearance. She is normal weight. HENT:      Head: Normocephalic and atraumatic. Right Ear: Tympanic membrane, ear canal and external ear normal. No drainage. Left Ear: Tympanic membrane, ear canal and external ear normal. No drainage. Nose: Congestion present. No nasal deformity or septal deviation. Right Turbinates: Swollen. Left Turbinates: Swollen. Mouth/Throat:      Mouth: Mucous membranes are moist.   Eyes:      General: Lids are normal. Vision grossly intact. Extraocular Movements: Extraocular movements intact. Conjunctiva/sclera: Conjunctivae normal.      Pupils: Pupils are equal, round, and reactive to light. Cardiovascular:      Rate and Rhythm: Normal rate. Pulmonary:      Effort: Pulmonary effort is normal.   Musculoskeletal:         General: Normal range of motion. Cervical back: Normal range of motion. Lymphadenopathy:      Cervical: No cervical adenopathy. Skin:     Capillary Refill: Capillary refill takes less than 2 seconds. Neurological:      Mental Status: She is alert. Psychiatric:         Mood and Affect: Mood normal.         IMPRESSION/PLAN:  Patient seen and examined. I want the patient to continue on Singulair with nasal sprays for a consistent amount of time prior to discussing further options.  If after 4 weeks there is no relief or some symptom relief I want the patient to add Mucinex 2400mg daily (1200mg BID) and will see patient back for follow up in 2 months. Dr. Gilmer Beltran.  Otolaryngology Facial Plastic Surgery  :[facility] Otolaryngology/Facial Plastic Surgery Residency  Associate Clinical Professor:  Tricia Samuels [facility] [place]  1955      I have discussed the case, including pertinent history and exam findings with the resident. I have seen and examined the patient and the key elements of the encounter have been performed by me. I agree with the assessment, plan and orders as documented by the resident. Patient here for follow up of medical problems. Remainder of medical problems as per resident note.       [address], DO  5/12/22

## 2022-05-31 ENCOUNTER — TELEPHONE (OUTPATIENT)
Dept: ENT CLINIC | Age: 67
End: 2022-05-31

## 2022-05-31 NOTE — TELEPHONE ENCOUNTER
Pt called to see if she should keep her F/U appt on 6/20. Pt saw gastro and has had some meds changed to try to help problem and is holding off on Mucinex for a while. At end of call Pt decided to keep appt for now and will cancel closer if not needed. She wanted office to know she was not taking Mucinex.

## 2022-05-31 NOTE — TELEPHONE ENCOUNTER
Called Pt back and she will cancel and is F/U with gastro in July and will reschedule if needed after July.

## 2022-08-19 RX ORDER — MONTELUKAST SODIUM 10 MG/1
TABLET ORAL
Qty: 30 TABLET | Refills: 0 | Status: SHIPPED
Start: 2022-08-19 | End: 2022-09-27

## 2022-09-25 ENCOUNTER — TELEPHONE (OUTPATIENT)
Dept: ENT CLINIC | Age: 67
End: 2022-09-25

## 2022-09-27 RX ORDER — MONTELUKAST SODIUM 10 MG/1
TABLET ORAL
Qty: 30 TABLET | Refills: 0 | Status: SHIPPED | OUTPATIENT
Start: 2022-09-27

## 2022-09-27 RX ORDER — AZELASTINE 1 MG/ML
1 SPRAY, METERED NASAL 2 TIMES DAILY
Qty: 30 ML | Refills: 0 | Status: SHIPPED | OUTPATIENT
Start: 2022-09-27

## 2022-09-27 RX ORDER — FLUTICASONE PROPIONATE 50 MCG
1 SPRAY, SUSPENSION (ML) NASAL DAILY
Qty: 16 G | Refills: 0 | Status: SHIPPED | OUTPATIENT
Start: 2022-09-27

## 2022-10-17 ENCOUNTER — OFFICE VISIT (OUTPATIENT)
Dept: ENT CLINIC | Age: 67
End: 2022-10-17
Payer: COMMERCIAL

## 2022-10-17 VITALS
BODY MASS INDEX: 29.44 KG/M2 | DIASTOLIC BLOOD PRESSURE: 82 MMHG | SYSTOLIC BLOOD PRESSURE: 151 MMHG | HEIGHT: 62 IN | WEIGHT: 160 LBS | HEART RATE: 93 BPM

## 2022-10-17 DIAGNOSIS — K44.9 HIATAL HERNIA: ICD-10-CM

## 2022-10-17 DIAGNOSIS — J30.1 SEASONAL ALLERGIC RHINITIS DUE TO POLLEN: ICD-10-CM

## 2022-10-17 DIAGNOSIS — R09.82 POST-NASAL DRAINAGE: Primary | ICD-10-CM

## 2022-10-17 PROCEDURE — 99213 OFFICE O/P EST LOW 20 MIN: CPT | Performed by: OTOLARYNGOLOGY

## 2022-10-17 PROCEDURE — 1123F ACP DISCUSS/DSCN MKR DOCD: CPT | Performed by: OTOLARYNGOLOGY

## 2022-10-17 RX ORDER — GUAIFENESIN 600 MG/1
600 TABLET, EXTENDED RELEASE ORAL 2 TIMES DAILY
Qty: 30 TABLET | Refills: 5 | Status: SHIPPED | OUTPATIENT
Start: 2022-10-17 | End: 2022-11-01

## 2022-10-17 RX ORDER — FLUTICASONE PROPIONATE 50 MCG
2 SPRAY, SUSPENSION (ML) NASAL DAILY
Qty: 1 EACH | Refills: 5 | Status: SHIPPED | OUTPATIENT
Start: 2022-10-17

## 2022-10-17 RX ORDER — AZELASTINE 1 MG/ML
2 SPRAY, METERED NASAL 2 TIMES DAILY
Qty: 30 ML | Refills: 5 | Status: SHIPPED | OUTPATIENT
Start: 2022-10-17

## 2022-10-17 RX ORDER — MONTELUKAST SODIUM 10 MG/1
10 TABLET ORAL DAILY
Qty: 30 TABLET | Refills: 3 | Status: SHIPPED | OUTPATIENT
Start: 2022-10-17

## 2022-10-17 ASSESSMENT — ENCOUNTER SYMPTOMS
COUGH: 0
VOMITING: 0
SHORTNESS OF BREATH: 0

## 2022-10-17 NOTE — PROGRESS NOTES
[facility] Otolaryngology  Dr. Diane Gunter. Ivonne Dodd. Ms.Ed        Patient Name:  Meeta Wakefield  :  1955     CHIEF C/O:    Chief Complaint   Patient presents with    Follow-up     8 wk allergy, comes and goes, currently has extreme sinus drainage w/nausea,        HISTORY OBTAINED FROM:  patient    HISTORY OF PRESENT ILLNESS:       Iris Edwards is a 74y.o. year old female, here today for follow up of here for routine allergey follow up. Patient has been on nasal spray  with Astelin and Flonase and Singulair. Having increased drainage that leads to nausea. Has no history of recurrent sinusitis, current antibiotic therapy. No current complaints of headaches or vision changes. No complaints of hoarseness. No complaints of difficulty swallowing.       Past Medical History:   Diagnosis Date    Allergic rhinitis     Anxiety     Asymptomatic gallstones     B12 deficiency 5/10/2020    Hemangioma of liver     Hernia, hiatal     Intractable vomiting with nausea 2020    Kidney stone     Renal mass, right     Exophytic 11 x 6 mm    Skin cancer (melanoma) ([facility] [address] 2019    sugically removed from right upper arm     Past Surgical History:   Procedure Laterality Date    CHOLECYSTECTOMY, LAPAROSCOPIC N/A 2020    LAPAROSCOPIC ROBOTIC ASSISTED CHOLECYSTECTOMY performed by Alcides Moss MD at [address] [facility]      Negative findings    CYST REMOVAL      fatty cyst removed from back    ENDOSCOPY, COLON, DIAGNOSTIC      HYSTERECTOMY (CERVIX STATUS UNKNOWN)      patient states only ovary was removed on right    OVARY REMOVAL      PARTIAL NEPHRECTOMY N/A 2020    RIGHT NEPHRECTOMY PARTIAL ROBOTIC XI performed by Ame Blood MD at [address]  2020    Hiatal hernia    UPPER GASTROINTESTINAL ENDOSCOPY N/A 2020    EGD ESOPHAGOGASTRODUODENOSCOPY performed by David Chacon MD at [address] N/A 2020 EGD ESOPHAGOGASTRODUODENOSCOPY ULTRASOUND performed by Vicky Simons MD at [address] [place] [address]       Current Outpatient Medications:     montelukast (SINGULAIR) 10 MG tablet, TAKE ONE TABLET BY MOUTH DAILY, Disp: 30 tablet, Rfl: 0    fluticasone (FLONASE) 50 MCG/ACT nasal spray, 1 spray by Each Nostril route daily, Disp: 16 g, Rfl: 0    azelastine (ASTELIN) 0.1 % nasal spray, 1 spray by Nasal route 2 times daily Use in each nostril as directed, Disp: 1 each, Rfl: 3    fluticasone (FLONASE) 50 MCG/ACT nasal spray, 1 spray by Each Nostril route daily, Disp: 1 Bottle, Rfl: 0    pantoprazole (PROTONIX) 40 MG tablet, Take 40 mg by mouth daily, Disp: , Rfl:     Cholecalciferol (VITAMIN D3) 50 MCG (2000 UT) CAPS, Take by mouth, Disp: , Rfl:     busPIRone (BUSPAR) 5 MG tablet, Take 5 mg by mouth 2 times daily, Disp: , Rfl:     famotidine (PEPCID) 20 MG tablet, Take 20 mg by mouth nightly, Disp: , Rfl:     trimethobenzamide (TIGAN) 300 MG capsule, Take 300 mg by mouth 3 times daily as needed, Disp: , Rfl:     sucralfate (CARAFATE) 1 GM tablet, Take 1 tablet by mouth every 12 hours (Patient taking differently: Take 1 g by mouth nightly), Disp: 120 tablet, Rfl: 3    sodium chloride (OCEAN, BABY AYR) 0.65 % nasal spray, 1 spray by Nasal route as needed for Congestion, Disp: , Rfl: 0    azelastine (ASTELIN) 0.1 % nasal spray, 1 spray by Nasal route 2 times daily Use in each nostril as directed (Patient not taking: Reported on 10/17/2022), Disp: 30 mL, Rfl: 0    azelastine (ASTELIN) 0.1 % nasal spray, 2 sprays by Nasal route 2 times daily Use in each nostril as directed (Patient not taking: Reported on 10/17/2022), Disp: 120 mL, Rfl: 1    VORTIoxetine (TRINTELLIX) 5 MG tablet, Take 10 mg by mouth daily (Patient not taking: Reported on 10/17/2022), Disp: , Rfl:   Promethazine and Zofran [ondansetron hcl]  Social History     Tobacco Use    Smoking status: Never    Smokeless tobacco: Never   Substance Use Topics    Alcohol use:  No Drug use: No     Family History   Problem Relation Age of Onset    Other Mother         Mother  age 80; many prescribed medication addictions    Cancer Father          age 80, lymphoma    High Cholesterol Sister        Review of Systems   Constitutional:  Negative for chills and fever. HENT:  Negative for ear discharge and hearing loss. Respiratory:  Negative for cough and shortness of breath. Cardiovascular:  Negative for chest pain and palpitations. Gastrointestinal:  Negative for vomiting. Skin:  Negative for rash. Allergic/Immunologic: Negative for environmental allergies. Neurological:  Negative for dizziness and headaches. Hematological:  Does not bruise/bleed easily. All other systems reviewed and are negative. BP (!) 151/82   Pulse 93   Ht 5' 2\" (1.575 m)   Wt 160 lb (72.6 kg)   BMI 29.26 kg/m²   Physical Exam  Vitals and nursing note reviewed. Constitutional:       Appearance: She is well-developed. HENT:      Head: Normocephalic and atraumatic. Right Ear: Tympanic membrane and ear canal normal.      Left Ear: Tympanic membrane and ear canal normal.      Nose: Congestion and rhinorrhea present. Mouth/Throat:      Mouth: Mucous membranes are moist.      Pharynx: No oropharyngeal exudate or posterior oropharyngeal erythema. Eyes:      Pupils: Pupils are equal, round, and reactive to light. Neck:      Thyroid: No thyromegaly. Trachea: No tracheal deviation. Cardiovascular:      Rate and Rhythm: Normal rate. Pulmonary:      Effort: Pulmonary effort is normal. No respiratory distress. Musculoskeletal:         General: Normal range of motion. Cervical back: Normal range of motion. Lymphadenopathy:      Cervical: No cervical adenopathy. Skin:     General: Skin is warm. Findings: No erythema. Neurological:      Mental Status: She is alert. Cranial Nerves: No cranial nerve deficit.      IMPRESSION/PLAN:  Patient is seen and examined, recommendation for the patient to continue Astelin Flonase Singulair and Mucinex as needed. Dr. Ev Buck D.O.  Ms. Filiberto Berger.  Otolaryngology Facial Plastic Surgery  :  [address] [place] [address] Otolaryngology/Facial Plastic Surgery Residency  Associate Clinical Professor:  Kristina Farrar, [facility]

## 2022-12-02 NOTE — ADDENDUM NOTE
Addendum  created 11/09/20 1737 by Parisa Joyner MD    Clinical Note Signed, Pend clinical note Pt put on weaning trial.

## 2023-02-06 ENCOUNTER — APPOINTMENT (OUTPATIENT)
Dept: CT IMAGING | Age: 68
End: 2023-02-06
Payer: MEDICARE

## 2023-02-06 ENCOUNTER — HOSPITAL ENCOUNTER (EMERGENCY)
Age: 68
Discharge: HOME | End: 2023-02-06
Attending: EMERGENCY MEDICINE
Payer: MEDICARE

## 2023-02-06 VITALS
WEIGHT: 162 LBS | HEART RATE: 80 BPM | TEMPERATURE: 98 F | OXYGEN SATURATION: 98 % | DIASTOLIC BLOOD PRESSURE: 86 MMHG | SYSTOLIC BLOOD PRESSURE: 148 MMHG | RESPIRATION RATE: 16 BRPM | BODY MASS INDEX: 29.63 KG/M2

## 2023-02-06 DIAGNOSIS — K76.0 FATTY LIVER: ICD-10-CM

## 2023-02-06 DIAGNOSIS — J01.00 ACUTE MAXILLARY SINUSITIS, RECURRENCE NOT SPECIFIED: ICD-10-CM

## 2023-02-06 DIAGNOSIS — R11.0 NAUSEA: Primary | ICD-10-CM

## 2023-02-06 DIAGNOSIS — F41.1 ANXIETY STATE: ICD-10-CM

## 2023-02-06 DIAGNOSIS — K44.9 HIATAL HERNIA: ICD-10-CM

## 2023-02-06 DIAGNOSIS — R94.31 QT PROLONGATION: ICD-10-CM

## 2023-02-06 LAB
ALBUMIN SERPL-MCNC: 4.3 G/DL (ref 3.5–5.2)
ALP BLD-CCNC: 84 U/L (ref 35–104)
ALT SERPL-CCNC: 13 U/L (ref 0–32)
ANION GAP SERPL CALCULATED.3IONS-SCNC: 15 MMOL/L (ref 7–16)
AST SERPL-CCNC: 14 U/L (ref 0–31)
BACTERIA: ABNORMAL /HPF
BASOPHILS ABSOLUTE: 0.02 E9/L (ref 0–0.2)
BASOPHILS RELATIVE PERCENT: 0.4 % (ref 0–2)
BILIRUB SERPL-MCNC: 0.7 MG/DL (ref 0–1.2)
BILIRUBIN URINE: ABNORMAL
BLOOD, URINE: ABNORMAL
BUN BLDV-MCNC: 10 MG/DL (ref 6–23)
CALCIUM SERPL-MCNC: 9.9 MG/DL (ref 8.6–10.2)
CHLORIDE BLD-SCNC: 105 MMOL/L (ref 98–107)
CLARITY: CLEAR
CO2: 20 MMOL/L (ref 22–29)
COLOR: YELLOW
CREAT SERPL-MCNC: 0.8 MG/DL (ref 0.5–1)
EOSINOPHILS ABSOLUTE: 0.01 E9/L (ref 0.05–0.5)
EOSINOPHILS RELATIVE PERCENT: 0.2 % (ref 0–6)
GFR SERPL CREATININE-BSD FRML MDRD: >60 ML/MIN/1.73
GLUCOSE BLD-MCNC: 90 MG/DL (ref 74–99)
GLUCOSE URINE: NEGATIVE MG/DL
HCT VFR BLD CALC: 45.9 % (ref 34–48)
HEMOGLOBIN: 15.9 G/DL (ref 11.5–15.5)
IMMATURE GRANULOCYTES #: 0 E9/L
IMMATURE GRANULOCYTES %: 0 % (ref 0–5)
KETONES, URINE: 40 MG/DL
LACTIC ACID: 0.9 MMOL/L (ref 0.5–2.2)
LEUKOCYTE ESTERASE, URINE: NEGATIVE
LIPASE: 33 U/L (ref 13–60)
LYMPHOCYTES ABSOLUTE: 1.59 E9/L (ref 1.5–4)
LYMPHOCYTES RELATIVE PERCENT: 30.8 % (ref 20–42)
MCH RBC QN AUTO: 30.5 PG (ref 26–35)
MCHC RBC AUTO-ENTMCNC: 34.6 % (ref 32–34.5)
MCV RBC AUTO: 88.1 FL (ref 80–99.9)
MONOCYTES ABSOLUTE: 0.52 E9/L (ref 0.1–0.95)
MONOCYTES RELATIVE PERCENT: 10.1 % (ref 2–12)
NEUTROPHILS ABSOLUTE: 3.02 E9/L (ref 1.8–7.3)
NEUTROPHILS RELATIVE PERCENT: 58.5 % (ref 43–80)
NITRITE, URINE: NEGATIVE
PDW BLD-RTO: 11.9 FL (ref 11.5–15)
PH UA: 5.5 (ref 5–9)
PLATELET # BLD: 251 E9/L (ref 130–450)
PMV BLD AUTO: 9 FL (ref 7–12)
POTASSIUM SERPL-SCNC: 3.8 MMOL/L (ref 3.5–5)
PROTEIN UA: NEGATIVE MG/DL
RBC # BLD: 5.21 E12/L (ref 3.5–5.5)
RBC UA: ABNORMAL /HPF (ref 0–2)
SODIUM BLD-SCNC: 140 MMOL/L (ref 132–146)
SPECIFIC GRAVITY UA: 1.02 (ref 1–1.03)
TOTAL CK: 33 U/L (ref 20–180)
TOTAL PROTEIN: 6.8 G/DL (ref 6.4–8.3)
TROPONIN, HIGH SENSITIVITY: <6 NG/L (ref 0–9)
UROBILINOGEN, URINE: 0.2 E.U./DL
WBC # BLD: 5.2 E9/L (ref 4.5–11.5)
WBC UA: ABNORMAL /HPF (ref 0–5)

## 2023-02-06 PROCEDURE — 6360000002 HC RX W HCPCS: Performed by: EMERGENCY MEDICINE

## 2023-02-06 PROCEDURE — 74174 CTA ABD&PLVS W/CONTRAST: CPT

## 2023-02-06 PROCEDURE — 85025 COMPLETE CBC W/AUTO DIFF WBC: CPT

## 2023-02-06 PROCEDURE — 6360000004 HC RX CONTRAST MEDICATION: Performed by: RADIOLOGY

## 2023-02-06 PROCEDURE — 99285 EMERGENCY DEPT VISIT HI MDM: CPT

## 2023-02-06 PROCEDURE — 82550 ASSAY OF CK (CPK): CPT

## 2023-02-06 PROCEDURE — 96372 THER/PROPH/DIAG INJ SC/IM: CPT

## 2023-02-06 PROCEDURE — 83605 ASSAY OF LACTIC ACID: CPT

## 2023-02-06 PROCEDURE — 83690 ASSAY OF LIPASE: CPT

## 2023-02-06 PROCEDURE — 84484 ASSAY OF TROPONIN QUANT: CPT

## 2023-02-06 PROCEDURE — 36415 COLL VENOUS BLD VENIPUNCTURE: CPT

## 2023-02-06 PROCEDURE — 96361 HYDRATE IV INFUSION ADD-ON: CPT

## 2023-02-06 PROCEDURE — 70450 CT HEAD/BRAIN W/O DYE: CPT

## 2023-02-06 PROCEDURE — 2580000003 HC RX 258: Performed by: EMERGENCY MEDICINE

## 2023-02-06 PROCEDURE — 81001 URINALYSIS AUTO W/SCOPE: CPT

## 2023-02-06 PROCEDURE — 80053 COMPREHEN METABOLIC PANEL: CPT

## 2023-02-06 PROCEDURE — 93005 ELECTROCARDIOGRAM TRACING: CPT | Performed by: EMERGENCY MEDICINE

## 2023-02-06 PROCEDURE — 6370000000 HC RX 637 (ALT 250 FOR IP): Performed by: EMERGENCY MEDICINE

## 2023-02-06 RX ORDER — AMOXICILLIN AND CLAVULANATE POTASSIUM 875; 125 MG/1; MG/1
1 TABLET, FILM COATED ORAL 2 TIMES DAILY
Qty: 20 TABLET | Refills: 0 | Status: SHIPPED | OUTPATIENT
Start: 2023-02-06 | End: 2023-02-16

## 2023-02-06 RX ORDER — DIAZEPAM 5 MG/1
5 TABLET ORAL EVERY 8 HOURS PRN
Qty: 21 TABLET | Refills: 0 | Status: SHIPPED | OUTPATIENT
Start: 2023-02-06 | End: 2023-02-13

## 2023-02-06 RX ORDER — PROMETHAZINE HYDROCHLORIDE 12.5 MG/1
12.5 TABLET ORAL EVERY 6 HOURS PRN
COMMUNITY

## 2023-02-06 RX ORDER — OMEPRAZOLE 20 MG/1
40 CAPSULE, DELAYED RELEASE ORAL DAILY
COMMUNITY

## 2023-02-06 RX ORDER — DIAZEPAM 5 MG/1
5 TABLET ORAL ONCE
Status: COMPLETED | OUTPATIENT
Start: 2023-02-06 | End: 2023-02-06

## 2023-02-06 RX ORDER — 0.9 % SODIUM CHLORIDE 0.9 %
1000 INTRAVENOUS SOLUTION INTRAVENOUS ONCE
Status: COMPLETED | OUTPATIENT
Start: 2023-02-06 | End: 2023-02-06

## 2023-02-06 RX ADMIN — IOPAMIDOL 75 ML: 755 INJECTION, SOLUTION INTRAVENOUS at 15:55

## 2023-02-06 RX ADMIN — SODIUM CHLORIDE 1000 ML: 9 INJECTION, SOLUTION INTRAVENOUS at 14:40

## 2023-02-06 RX ADMIN — TRIMETHOBENZAMIDE HYDROCHLORIDE 200 MG: 100 INJECTION INTRAMUSCULAR at 14:37

## 2023-02-06 RX ADMIN — DIAZEPAM 5 MG: 5 TABLET ORAL at 14:17

## 2023-02-06 ASSESSMENT — PAIN - FUNCTIONAL ASSESSMENT: PAIN_FUNCTIONAL_ASSESSMENT: NONE - DENIES PAIN

## 2023-02-07 ENCOUNTER — TELEPHONE (OUTPATIENT)
Dept: ENT CLINIC | Age: 68
End: 2023-02-07

## 2023-02-07 LAB
EKG ATRIAL RATE: 89 BPM
EKG P AXIS: 30 DEGREES
EKG P-R INTERVAL: 162 MS
EKG Q-T INTERVAL: 406 MS
EKG QRS DURATION: 128 MS
EKG QTC CALCULATION (BAZETT): 493 MS
EKG R AXIS: 111 DEGREES
EKG T AXIS: -8 DEGREES
EKG VENTRICULAR RATE: 89 BPM

## 2023-02-07 PROCEDURE — 93010 ELECTROCARDIOGRAM REPORT: CPT | Performed by: INTERNAL MEDICINE

## 2023-02-07 NOTE — TELEPHONE ENCOUNTER
Spoke with patient and informed patient to complete Augmentin and if symptoms do not subside then call office and we will move her appt up. Otherwise, patient will be seen 3/27/23.   Patient states that she understands

## 2023-02-07 NOTE — ED PROVIDER NOTES
[address] ENCOUNTER        Pt Name: Karyna Mendes  MRN: [id number]  [place] 1955  Date of evaluation: 2/6/2023  Provider: Abel Mcdowell DO  PCP: Lilo Garland III, DO  Note Started: 8:00 AM EST 2/7/23    CHIEF COMPLAINT       Chief Complaint   Patient presents with    GI Problem     4 days wit N/V/D       HISTORY OF PRESENT ILLNESS: 1 or more Elements   History From: patient    Limitations to history : None    Karyna Mendes is a 74 y.o. female who presents with nausea of a chronic nature with sinus drainage that upsets her nausea of a chronic nature. She reports this nausea is life hindering. She then becomes a stress of her anxiety and she gets diarrhea with mucous. She has been seen by GI, had EGD and colonoscopy, CT scans and placed on PPI (then changed to prilosec). She has been seen by ENT (Dr Bishop Mccall) as well. She reports she takes phenergan at home but it doesn't help, she can't take anything else because of QTc prolongation (Dr María George has advised her that phenergan is ok). She states the only thing that works for her is Tigan and the oral form was discontinued long ago. She takes Buspar for her anxiety (which doesn't help much either). She presents requesting another opinion about this chronic nausea and anxiety which is hindering her quality of life. The complaint has been persistent, moderate in severity, and worsened by nothing. Patient denies fever/chills, sore throat, cough, congestion, chest pain, shortness of breath, edema, headache, visual disturbances, dizziness, dysphagia, focal paresthesias, focal weakness, abdominal pain, vomiting, constipation, dysuria, hematuria, trauma, neck or back pain, rash or other complaints. She also takes Carafate.   She has a past medical history of significant for laparoscopic cholecystectomy, partial nephrectomy, tubal ligation, liver hemangioma and hiatal hernia. Nursing Notes were all reviewed and agreed with or any disagreements were addressed in the HPI. REVIEW OF SYSTEMS :           Positives and Pertinent negatives as per HPI.      SURGICAL HISTORY     Past Surgical History:   Procedure Laterality Date    CHOLECYSTECTOMY, LAPAROSCOPIC N/A 5/12/2020    LAPAROSCOPIC ROBOTIC ASSISTED CHOLECYSTECTOMY performed by Jarad Chew MD at [address] [facility]  2017    Negative findings    CYST REMOVAL      fatty cyst removed from back    ENDOSCOPY, COLON, DIAGNOSTIC      HYSTERECTOMY (CERVIX STATUS UNKNOWN)      patient states only ovary was removed on right    OVARY REMOVAL      PARTIAL NEPHRECTOMY N/A 11/9/2020    RIGHT NEPHRECTOMY PARTIAL ROBOTIC XI performed by Mohini Pascual MD at [address] [facility] [address]  01/2020    Hiatal hernia    UPPER GASTROINTESTINAL ENDOSCOPY N/A 7/31/2020    EGD ESOPHAGOGASTRODUODENOSCOPY performed by Kelsea Payne MD at [facility] N/A 7/31/2020    EGD ESOPHAGOGASTRODUODENOSCOPY ULTRASOUND performed by Kelsea Payne MD at [facility]       Discharge Medication List as of 2/6/2023  5:56 PM        CONTINUE these medications which have NOT CHANGED    Details   omeprazole (PRILOSEC) 20 MG delayed release capsule Take 40 mg by mouth dailyHistorical Med      promethazine (PHENERGAN) 12.5 MG tablet Take 12.5 mg by mouth every 6 hours as needed for NauseaHistorical Med      !! fluticasone (FLONASE) 50 MCG/ACT nasal spray 2 sprays by Nasal route daily 2 sprays each nostril once daily, Disp-1 each, R-5Normal      !! azelastine (ASTELIN) 0.1 % nasal spray 2 sprays by Nasal route 2 times daily Use in each nostril as directed, Disp-30 mL, R-5Normal      !! montelukast (SINGULAIR) 10 MG tablet Take 1 tablet by mouth daily, Disp-30 tablet, R-3Normal      !! montelukast (SINGULAIR) 10 MG tablet TAKE ONE TABLET BY MOUTH DAILY, Disp-30 tablet, R-0Normal      !! azelastine (ASTELIN) 0.1 % nasal spray 1 spray by Nasal route 2 times daily Use in each nostril as directed, Disp-30 mL, R-0Normal      !! fluticasone (FLONASE) 50 MCG/ACT nasal spray 1 spray by Each Nostril route daily, Disp-16 g, R-0Normal      !! azelastine (ASTELIN) 0.1 % nasal spray 2 sprays by Nasal route 2 times daily Use in each nostril as directed, Disp-120 mL, R-1Normal      !! azelastine (ASTELIN) 0.1 % nasal spray 1 spray by Nasal route 2 times daily Use in each nostril as directed, Disp-1 each, R-3Normal      !! fluticasone (FLONASE) 50 MCG/ACT nasal spray 1 spray by Each Nostril route daily, Disp-1 Bottle, R-0Print      Cholecalciferol (VITAMIN D3) 50 MCG ( UT) CAPS Take by mouthHistorical Med      busPIRone (BUSPAR) 5 MG tablet Take 5 mg by mouth 2 times dailyHistorical Med      famotidine (PEPCID) 20 MG tablet Take 20 mg by mouth nightlyHistorical Med      sucralfate (CARAFATE) 1 GM tablet Take 1 tablet by mouth every 12 hours, Disp-120 tablet, R-3Normal      sodium chloride (OCEAN, BABY AYR) 0.65 % nasal spray 1 spray by Nasal route as needed for Congestion, R-0OTC       !! - Potential duplicate medications found. Please discuss with provider.           ALLERGIES     Promethazine and Zofran [ondansetron hcl]    FAMILYHISTORY       Family History   Problem Relation Age of Onset    Other Mother         Mother  age 80; many prescribed medication addictions    Cancer Father          age 80, lymphoma    High Cholesterol Sister         SOCIAL HISTORY       Social History     Tobacco Use    Smoking status: Never    Smokeless tobacco: Never   Substance Use Topics    Alcohol use: No    Drug use: No       SCREENINGS        Charanjit Coma Scale  Eye Opening: Spontaneous  Best Verbal Response: Oriented  Best Motor Response: Obeys commands  Charanjit Coma Scale Score: 15                CIWA Assessment  BP: (!) 148/86  Heart Rate: 80           PHYSICAL EXAM  1 or more Elements     ED Triage Vitals   BP Temp Temp Source Heart Rate Resp SpO2 Height Weight   02/06/23 1125 02/06/23 1125 02/06/23 1125 02/06/23 1125 02/06/23 1125 02/06/23 1125 -- 02/06/23 1145   (!) 148/90 98.4 °F (36.9 °C) Oral 80 16 98 %  162 lb (73.5 kg)            ----------------------------------------PHYSICAL EXAM--------------------------------------  Constitutional:  Well developed, well nourished, no acute distress, non-toxic appearance   Eyes:  PERRL, conjunctiva normal, EOMI, no nystagmus  HENT:  Atraumatic, external ears normal, nose normal, oropharynx moist, no pharyngeal exudates. TMs clear and intact bilaterally. No mastoid tenderness bilaterally. Bilateral external auditory canals pink and dry. Neck- normal range of motion, no nuchal rigidity   Respiratory:  No respiratory distress, normal breath sounds, no rales, no wheezing   Cardiovascular:  Normal rate, normal rhythm, no murmurs, no gallops, no rubs. Radial and DP pulses 2+ bilaterally. Compartments are soft. She is warm and well-perfused. Cap refill less than 3 seconds. GI:  Soft, nondistended, normal bowel sounds, nontender, no organomegaly, no mass, no rebound, no guarding   :  No costovertebral angle tenderness   Musculoskeletal:  No edema, no tenderness, no deformities. Back- no tenderness  Integument:  Well hydrated, no rash. Adequate perfusion. Lymphatic:  No cervical lymphadenopathy noted   Neurologic:  Alert & oriented x 3, CN 2-12 normal,no focal deficits noted. DTRs 2+ bilateral patellar. Normal gait. Negative test of skew.    Psychiatric:  Speech and behavior appropriate             DIAGNOSTIC RESULTS   LABS:  Results for orders placed or performed during the hospital encounter of 02/06/23   Urinalysis with Microscopic   Result Value Ref Range    Color, UA Yellow Straw/Yellow    Clarity, UA Clear Clear    Glucose, Ur Negative Negative mg/dL    Bilirubin Urine SMALL (A) Negative    Ketones, Urine 40 (A) Negative mg/dL Specific Gravity, UA 1.020 1.005 - 1.030    Blood, Urine TRACE-INTACT Negative    pH, UA 5.5 5.0 - 9.0    Protein, UA Negative Negative mg/dL    Urobilinogen, Urine 0.2 <2.0 E.U./dL    Nitrite, Urine Negative Negative    Leukocyte Esterase, Urine Negative Negative    WBC, UA 1-3 0 - 5 /HPF    RBC, UA 0-1 0 - 2 /HPF    Bacteria, UA NONE SEEN None Seen /HPF   CBC with Auto Differential   Result Value Ref Range    WBC 5.2 4.5 - 11.5 E9/L    RBC 5.21 3.50 - 5.50 E12/L    Hemoglobin 15.9 (H) 11.5 - 15.5 g/dL    Hematocrit 45.9 34.0 - 48.0 %    MCV 88.1 80.0 - 99.9 fL    MCH 30.5 26.0 - 35.0 pg    MCHC 34.6 (H) 32.0 - 34.5 %    RDW 11.9 11.5 - 15.0 fL    Platelets 312 066 - 631 E9/L    MPV 9.0 7.0 - 12.0 fL    Neutrophils % 58.5 43.0 - 80.0 %    Immature Granulocytes % 0.0 0.0 - 5.0 %    Lymphocytes % 30.8 20.0 - 42.0 %    Monocytes % 10.1 2.0 - 12.0 %    Eosinophils % 0.2 0.0 - 6.0 %    Basophils % 0.4 0.0 - 2.0 %    Neutrophils Absolute 3.02 1.80 - 7.30 E9/L    Immature Granulocytes # 0.00 E9/L    Lymphocytes Absolute 1.59 1.50 - 4.00 E9/L    Monocytes Absolute 0.52 0.10 - 0.95 E9/L    Eosinophils Absolute 0.01 (L) 0.05 - 0.50 E9/L    Basophils Absolute 0.02 0.00 - 0.20 E9/L   Comprehensive Metabolic Panel   Result Value Ref Range    Sodium 140 132 - 146 mmol/L    Potassium 3.8 3.5 - 5.0 mmol/L    Chloride 105 98 - 107 mmol/L    CO2 20 (L) 22 - 29 mmol/L    Anion Gap 15 7 - 16 mmol/L    Glucose 90 74 - 99 mg/dL    BUN 10 6 - 23 mg/dL    Creatinine 0.8 0.5 - 1.0 mg/dL    Est, Glom Filt Rate >60 >=60 mL/min/1.73    Calcium 9.9 8.6 - 10.2 mg/dL    Total Protein 6.8 6.4 - 8.3 g/dL    Albumin 4.3 3.5 - 5.2 g/dL    Total Bilirubin 0.7 0.0 - 1.2 mg/dL    Alkaline Phosphatase 84 35 - 104 U/L    ALT 13 0 - 32 U/L    AST 14 0 - 31 U/L   Lactic Acid   Result Value Ref Range    Lactic Acid 0.9 0.5 - 2.2 mmol/L   Lipase   Result Value Ref Range    Lipase 33 13 - 60 U/L   Troponin   Result Value Ref Range    Troponin, High Sensitivity <6 0 - 9 ng/L   CK   Result Value Ref Range    Total CK 33 20 - 180 U/L   EKG 12 Lead   Result Value Ref Range    Ventricular Rate 89 BPM    Atrial Rate 89 BPM    P-R Interval 162 ms    QRS Duration 128 ms    Q-T Interval 406 ms    QTc Calculation (Bazett) 493 ms    P Axis 30 degrees    R Axis 111 degrees    T Axis -8 degrees       As interpreted by me, the above displayed labs are abnormal. All other labs obtained during this visit were within normal range or not returned as of this dictation. EKG Interpretation  Interpreted by emergency department physicianAbel DO  Time: 13:54 PM EDT  Rhythm: normal sinus   Rate: 89  Axis: normal  Conduction: right bundle branch block (complete) and left posterior fasciclar block, QTc 493  ST Segments: no acute change  T Waves: non specific changes  Clinical Impression: bifascicular block, QTc prolongation  Comparison to prior EKG: stable as compared to patient's most recent EKG        RADIOLOGY:   Non-plain film images such as CT, Ultrasound and MRI are read by the radiologist. Plain radiographic images are visualized and preliminarily interpreted by the ED Provider with the below findings:    none    Interpretation per the Radiologist below, if available at the time of this note:    CT HEAD WO CONTRAST   Final Result   1. No acute intracranial abnormality. 2. There is dense fluid in the right maxillary sinus which could represent   hemorrhage. CTA ABDOMEN PELVIS W CONTRAST   Final Result   Unremarkable CTA of the abdomen and pelvis. There are large hiatal hernia, fatty liver and probable hemangioma at the   inferior tip of the right lobe.            CT HEAD WO CONTRAST    Result Date: 2/6/2023  EXAMINATION: CT OF THE HEAD WITHOUT CONTRAST  2/6/2023 3:26 pm TECHNIQUE: CT of the head was performed without the administration of intravenous contrast. Automated exposure control, iterative reconstruction, and/or weight based adjustment of the mA/kV was utilized to reduce the radiation dose to as low as reasonably achievable. COMPARISON: CT head 07/07/2018 HISTORY: ORDERING SYSTEM PROVIDED HISTORY: Evaluate intracranial abnormality TECHNOLOGIST PROVIDED HISTORY: Has a \"code stroke\" or \"stroke alert\" been called? ->No Reason for exam:->Evaluate intracranial abnormality Decision Support Exception - unselect if not a suspected or confirmed emergency medical condition->Emergency Medical Condition (MA) FINDINGS: There are no areas of abnormal attenuation within the brain parenchyma. No evidence of mass, mass effect, or midline shift. The ventricles and sulci are of normal size and configuration. No extra-axial fluid collections or acute hemorrhage. The gray-white differentiation appears preserved without evidence of acute cortical ischemia. The calvarium is intact. There is fluid within the right maxillary sinus which is dense and could represent hemorrhage. There is mild mucosal thickening within right ethmoid air cells. The remaining visualized paranasal sinuses and mastoid air cells are clear. 1. No acute intracranial abnormality. 2. There is dense fluid in the right maxillary sinus which could represent hemorrhage. CTA ABDOMEN PELVIS W CONTRAST    Result Date: 2/6/2023  EXAMINATION: CTA OF THE ABDOMEN AND PELVIS WITH CONTRAST 2/6/2023 3:26 pm: TECHNIQUE: CTA of the abdomen and pelvis was performed with the administration of intravenous contrast. Multiplanar reformatted images are provided for review. MIP images are provided for review. Automated exposure control, iterative reconstruction, and/or weight based adjustment of the mA/kV was utilized to reduce the radiation dose to as low as reasonably achievable.  COMPARISON: June 19, 2020 HISTORY: ORDERING SYSTEM PROVIDED HISTORY: ?SMA syndrome - persistent n/v TECHNOLOGIST PROVIDED HISTORY: Reason for exam:->?SMA syndrome - persistent n/v Decision Support Exception - unselect if not a suspected or confirmed emergency medical condition->Emergency Medical Condition (MA) FINDINGS: CTA ABDOMEN: Abdominal aorta is normal in course and caliber. There is no evidence of aneurysm, intimal dissection or surrounding fluid collection. The major branches are patent. Specifically, there is no evidence of SMA syndrome. CTA PELVIS: The iliac arteries are normal in course and caliber. No evidence of aneurysm, intimal dissection or surrounding fluid collection. No occlusion. Unremarkable CTA of the abdomen and pelvis. There are large hiatal hernia, fatty liver and probable hemangioma at the inferior tip of the right lobe. No results found. PROCEDURES   Unless otherwise noted below, none          CRITICAL CARE TIME (.cct)   none    PAST MEDICAL HISTORY/Chronic Conditions Affecting Care      has a past medical history of Allergic rhinitis, Anxiety, Asymptomatic gallstones (2020), B12 deficiency (5/10/2020), Hemangioma of liver, Hernia, hiatal, Intractable vomiting with nausea (5/9/2020), Kidney stone, Renal mass, right, and [facility] (06/[facility]). EMERGENCY DEPARTMENT COURSE    Vitals:    Vitals:    02/06/23 1125 02/06/23 1145 02/06/23 1806 02/06/23 1810   BP: (!) 148/90  (!) 148/86 (!) 148/86   Pulse: 80   80   Resp: 16  16 16   Temp: 98.4 °F (36.9 °C)  (!) 82 °F (27.8 °C) 98 °F (36.7 °C)   TempSrc: Oral      SpO2: 98%  98% 98%   Weight:  162 lb (73.5 kg)         Patient was given the following medications:  Medications   0.9 % sodium chloride bolus (0 mLs IntraVENous Stopped 2/6/23 1745)   trimethobenzamide (TIGAN) injection 200 mg (200 mg IntraMUSCular Given 2/6/23 1437)   diazePAM (VALIUM) tablet 5 mg (5 mg Oral Given 2/6/23 1417)   iopamidol (ISOVUE-370) 76 % injection 75 mL (75 mLs IntraVENous Given 2/6/23 3755)           Is this patient to be included in the SEP-1 Core Measure due to severe sepsis or septic shock?    No Exclusion criteria - the patient is NOT to be included for SEP-1 Core Measure due to: 2+ SIRS criteria are not met        Medical Decision Making/Differential Diagnosis:    CC/HPI Summary, Social Determinants of health, Records Reviewed, DDx, testing done/not done, ED Course, Reassessment, disposition considerations/shared decision making with patient, consults, disposition:            MDM:   Presents with a chronic nausea and sinus drainage which seems to make her nausea worse and give her diarrhea. Patient feels like the sinus drainage is causing her persistent nausea, anxiety and then diarrhea. Her head CT today is consistent with right maxillary opacification read as potential hematoma but could also be a sinus infection that is chronic, large retention cyst or other lesion. We will prescribe an antibiotic for now and have her follow-up with the ENT. She also has persistent nausea that is hindering her quality of life. She has QT prolongation which limits her options for nausea vomiting medication, Tigan does work which she was given an IM injection of Tigan here. She has a prescription at home for Phenergan, which was cleared by Dr. Surinder Ramírez, cardiology, for her to take safely but it does not work. I called and confirmed with the pharmacy that oral Tigan was discontinued years ago and unavailable. On CAT scan she has notable findings for large hiatal hernia, which could be causing her persistent nausea. She was referred to general surgery for consultation on these potential symptoms. She states her gastrointestinal doctor advised her that her symptoms are not related to the hernia, I advised for her to seek an opinion from a general surgeon which she will do. She was provided Valium today with improvement of her symptoms for a short time. I did give her a prescription for this for use as needed with safe use and driving restrictions reviewed. She was advised, and is aware, that it is addicting and will use it judiciously.   She will not operate vehicles or watch small children or enter into legal decisions while taking this Valium. Upon discharge all of her questions were answered to her satisfaction, she appears well, nontoxic alert and oriented x3 and ambulatory upon discharge. Differential diagnosis includes: Bowel obstruction, pancreatitis, gastric reflux, hiatal hernia, colitis, diverticulitis, SMA syndrome, anxiety, sinus infection, sinus lesion, QT prolongation, cardiac arrhythmia, coronary ischemia, to name a few      Re-Evaluations:  Time: 1800   Re-evaluation. Patients symptoms are improving  Repeat physical examination is not changed      CONSULTS: (Who and What was discussed)  None        Counseling: The emergency provider has spoken with the patient and family member/friend and discussed todays results, in addition to providing specific details for the plan of care and counseling regarding the diagnosis and prognosis. Questions are answered at this time and they are agreeable with the plan. I am the Primary Clinician of Record.     --------------------------------- IMPRESSION AND DISPOSITION ---------------------------------    IMPRESSION  1. Nausea    2. Acute maxillary sinusitis, recurrence not specified    3. Hiatal hernia    4. QT prolongation    5. Anxiety state    6.  Fatty liver        DISPOSITION  Disposition: Discharge to home  Patient condition is stable    PATIENT REFERRED TO:  Bunny Kussmaul, [address] [place] [id number] [facility] [phone]    Call today  general surgeon (hiatal hernia)    MD Juani Donis [phone] [facility] [address] [facility] [address] [facility] [address] [facility]  [phone]    Call today  general surgeon (hiatal hernia)    Rubi Farrar III, [facility] [phone]    Call today      [address] [facility] Emergency Department  [facility]  [address]  [phone]  Go to   If symptoms worsen    Tawnya Pineda DO  [address] [facility] [address] [place] [address]  [phone]    Schedule an appointment as soon as possible for a visit       Carolina Mata, [address] [place] [phone]    Call today      DISCHARGE MEDICATIONS:  Discharge Medication List as of 2/6/2023  5:56 PM        START taking these medications    Details   diazePAM (VALIUM) 5 MG tablet Take 1 tablet by mouth every 8 hours as needed for Anxiety (dizziness) for up to 7 days.  Max Daily Amount: 15 mg, Disp-21 tablet, R-0Normal             DISCONTINUED MEDICATIONS:  Discharge Medication List as of 2/6/2023  5:56 PM        STOP taking these medications       pantoprazole (PROTONIX) 40 MG tablet Comments:   Reason for Stopping:         VORTIoxetine (TRINTELLIX) 5 MG tablet Comments:   Reason for Stopping:         trimethobenzamide (TIGAN) 300 MG capsule Comments:   Reason for Stopping:                      (Please note that portions of this note were completed with a voice recognition program.  Efforts were made to edit the dictations but occasionally words are mis-transcribed.)    Trudy Gamino DO (electronically signed)           Trudy Gamino DO  02/07/23 [id number]

## 2023-02-07 NOTE — TELEPHONE ENCOUNTER
Pt called for an [place] ED f/u from 2/6 for acute maxillary sinusitis. CT of head was done. Pt was prescribed Augmentin BID  x 10 days, will start today. LOV was 10-17-22 currently scheduled for f/u 03-27-23.   Please call pt with appt/recommendations [phone]

## 2023-02-08 ENCOUNTER — TELEPHONE (OUTPATIENT)
Dept: ENT CLINIC | Age: 68
End: 2023-02-08

## 2023-02-08 NOTE — TELEPHONE ENCOUNTER
Pt called very upset that she had read her CT results revealing a possible acute hemorrhage and panicked. Pt just wanted to verify the results. Please advise. Pt understands instruction to continue antibiotic and F/U as scheduled.

## 2023-03-09 ENCOUNTER — HOSPITAL ENCOUNTER (OUTPATIENT)
Age: 68
Discharge: HOME | End: 2023-03-11

## 2023-03-09 PROCEDURE — 87449 NOS EACH ORGANISM AG IA: CPT

## 2023-03-09 PROCEDURE — 87324 CLOSTRIDIUM AG IA: CPT

## 2023-03-09 PROCEDURE — 89190 NASAL SMEAR FOR EOSINOPHILS: CPT

## 2023-03-09 PROCEDURE — 89055 LEUKOCYTE ASSESSMENT FECAL: CPT

## 2023-03-09 PROCEDURE — 83986 ASSAY PH BODY FLUID NOS: CPT

## 2023-03-09 PROCEDURE — 87329 GIARDIA AG IA: CPT

## 2023-03-09 PROCEDURE — 87045 FECES CULTURE AEROBIC BACT: CPT

## 2023-03-09 PROCEDURE — 82705 FATS/LIPIDS FECES QUAL: CPT

## 2023-03-10 LAB
C DIFF TOXIN/ANTIGEN: NORMAL
EOSINOPHIL SMEAR OTHER: NORMAL
GIARDIA ANTIGEN STOOL: NORMAL
WHITE BLOOD CELLS (WBC), STOOL: NORMAL

## 2023-03-11 LAB — CULTURE, STOOL: NORMAL

## 2023-03-27 ENCOUNTER — OFFICE VISIT (OUTPATIENT)
Dept: ENT CLINIC | Age: 68
End: 2023-03-27
Payer: MEDICARE

## 2023-03-27 VITALS — WEIGHT: 162 LBS | BODY MASS INDEX: 29.81 KG/M2 | HEIGHT: 62 IN

## 2023-03-27 DIAGNOSIS — R09.82 POST-NASAL DRAINAGE: Primary | ICD-10-CM

## 2023-03-27 DIAGNOSIS — J30.1 SEASONAL ALLERGIC RHINITIS DUE TO POLLEN: ICD-10-CM

## 2023-03-27 PROCEDURE — 1123F ACP DISCUSS/DSCN MKR DOCD: CPT | Performed by: OTOLARYNGOLOGY

## 2023-03-27 PROCEDURE — 99213 OFFICE O/P EST LOW 20 MIN: CPT | Performed by: OTOLARYNGOLOGY

## 2023-03-27 RX ORDER — FLUTICASONE PROPIONATE 50 MCG
2 SPRAY, SUSPENSION (ML) NASAL DAILY
Qty: 1 EACH | Refills: 2 | Status: SHIPPED | OUTPATIENT
Start: 2023-03-27

## 2023-03-27 RX ORDER — AZELASTINE 1 MG/ML
2 SPRAY, METERED NASAL 2 TIMES DAILY
Qty: 30 ML | Refills: 1 | Status: SHIPPED | OUTPATIENT
Start: 2023-03-27

## 2023-03-27 RX ORDER — MONTELUKAST SODIUM 10 MG/1
10 TABLET ORAL DAILY
Qty: 30 TABLET | Refills: 3 | Status: SHIPPED | OUTPATIENT
Start: 2023-03-27

## 2023-03-27 NOTE — LETTER
April 6, 2023      Josue Farley III, DO  [address]      Patient: Kelly Miramontes   MR Number: [id number]   YOB: 1955   Date of Visit: 3/27/2023       Dear Josue Farley III:    Thank you for referring Gianfranco Vaughn to me for evaluation/treatment. Below are the relevant portions of my assessment and plan of care. If you have questions, please do not hesitate to call me. I look forward to following Ramon Farley along with you.     Sincerely,        Jenn Silveira DO

## 2023-03-27 NOTE — PATIENT INSTRUCTIONS
Due to the volume of surgeries at our practice, please allow the surgery scheduler up to 2 weeks to call you to schedule surgery. If it has not been 14 business days after your office visit where surgery was discussed, please wait the appropriate time frame prior to calling office. SURGERY:_____/_____/_____    Nothing to eat or drink after midnight the night before surgery unless surgery is at [facility] or otherwise instructed by the hospital.    DO NOT TAKE ANY ASPIRIN PRODUCTS 7 days prior to surgery. Tylenol only. No Advil, Motrin, Aleve, or Ibuprofen. IF YOU ARE ON BLOOD THINNERS (PLAVIX, COUMADIN, ELIQUIS ETC) THESE WILL ALSO NEED TO BE HELD. Any illegal drugs in your system (including Marijuana even if legally prescribed) will result in your surgery being cancelled. Please be sure to check with our office or the hospital on time frame for the drugs to be out of your system. SHOULD YOUR INSURANCE CHANGE AT ANY TIME YOU MUST CONTACT OUR OFFICE. FAILURE TO DO SO MAY RESULT IN YOUR SURGERY BEING RESCHEDULED OR YOU MAY BE CHARGED AS SELF-PAY. Due to the high demand for surgery at our practice, if you cancel or reschedule your surgery two (2) times we may not reschedule you. If you need FMLA or Short Term Disability paperwork completed for your surgery, please complete your portion, ensure your name and date of birth are on them and fax them to [phone] asap. Paperwork can take up to 2 weeks to be completed. If you have any questions or concerns regarding your surgery, please contact the Surgery SchedulerTash [phone]. If you need medical clearance, you are responsible to contact your physician(s) to schedule an appointment for clearance. If clearance is not completed within 30 days of your surgery it may be cancelled. Our office will fax the appropriate forms that need to be completed to your physician(s).     The location of your surgery will call you the day prior to your

## 2023-04-05 ENCOUNTER — TELEPHONE (OUTPATIENT)
Dept: ENT CLINIC | Age: 68
End: 2023-04-05

## 2023-04-05 NOTE — TELEPHONE ENCOUNTER
Called and scheduled sx with pt  for 5/23/23 @ [facility]. Restrictions and information has been reviewed with patient;  Patient expressed understanding and all questions answered.

## 2023-04-06 ASSESSMENT — ENCOUNTER SYMPTOMS
SINUS PRESSURE: 0
VOMITING: 0
SORE THROAT: 0
SHORTNESS OF BREATH: 0
COUGH: 0

## 2023-04-12 PROBLEM — R09.82 POST-NASAL DRAINAGE: Status: ACTIVE | Noted: 2023-04-12

## 2023-04-12 PROBLEM — J31.0 CHRONIC RHINITIS: Status: ACTIVE | Noted: 2023-04-12

## 2023-04-20 ENCOUNTER — APPOINTMENT (OUTPATIENT)
Dept: GENERAL RADIOLOGY | Age: 68
End: 2023-04-20
Payer: MEDICARE

## 2023-04-20 ENCOUNTER — HOSPITAL ENCOUNTER (EMERGENCY)
Age: 68
Discharge: HOME | End: 2023-04-21
Attending: EMERGENCY MEDICINE
Payer: MEDICARE

## 2023-04-20 DIAGNOSIS — R19.7 NAUSEA VOMITING AND DIARRHEA: Primary | ICD-10-CM

## 2023-04-20 DIAGNOSIS — R11.2 NAUSEA VOMITING AND DIARRHEA: Primary | ICD-10-CM

## 2023-04-20 DIAGNOSIS — E86.0 DEHYDRATION: ICD-10-CM

## 2023-04-20 LAB
ALBUMIN SERPL-MCNC: 4.8 G/DL (ref 3.5–5.2)
ALP SERPL-CCNC: 93 U/L (ref 35–104)
ALT SERPL-CCNC: 25 U/L (ref 0–32)
ANION GAP SERPL CALCULATED.3IONS-SCNC: 15 MMOL/L (ref 7–16)
ANION GAP SERPL CALCULATED.3IONS-SCNC: 18 MMOL/L (ref 7–16)
AST SERPL-CCNC: 21 U/L (ref 0–31)
BACTERIA URNS QL MICRO: ABNORMAL /HPF
BILIRUB SERPL-MCNC: 1.1 MG/DL (ref 0–1.2)
BILIRUB UR QL STRIP: ABNORMAL
BUN SERPL-MCNC: 13 MG/DL (ref 6–23)
BUN SERPL-MCNC: 13 MG/DL (ref 6–23)
CALCIUM SERPL-MCNC: 10.5 MG/DL (ref 8.6–10.2)
CALCIUM SERPL-MCNC: 9.3 MG/DL (ref 8.6–10.2)
CHLORIDE SERPL-SCNC: 103 MMOL/L (ref 98–107)
CHLORIDE SERPL-SCNC: 107 MMOL/L (ref 98–107)
CLARITY UR: CLEAR
CO2 SERPL-SCNC: 17 MMOL/L (ref 22–29)
CO2 SERPL-SCNC: 17 MMOL/L (ref 22–29)
COLOR UR: YELLOW
CREAT SERPL-MCNC: 0.7 MG/DL (ref 0.5–1)
CREAT SERPL-MCNC: 0.8 MG/DL (ref 0.5–1)
EPI CELLS #/AREA URNS HPF: ABNORMAL /HPF
ERYTHROCYTE [DISTWIDTH] IN BLOOD BY AUTOMATED COUNT: 12.8 FL (ref 11.5–15)
GLUCOSE SERPL-MCNC: 107 MG/DL (ref 74–99)
GLUCOSE SERPL-MCNC: 97 MG/DL (ref 74–99)
GLUCOSE UR STRIP-MCNC: NEGATIVE MG/DL
HCT VFR BLD AUTO: 51 % (ref 34–48)
HGB BLD-MCNC: 17 G/DL (ref 11.5–15.5)
HGB UR QL STRIP: ABNORMAL
KETONES UR STRIP-MCNC: 15 MG/DL
LACTATE BLDV-SCNC: 1.8 MMOL/L (ref 0.5–2.2)
LEUKOCYTE ESTERASE UR QL STRIP: NEGATIVE
LIPASE: 34 U/L (ref 13–60)
MCH RBC QN AUTO: 29.6 PG (ref 26–35)
MCHC RBC AUTO-ENTMCNC: 33.3 % (ref 32–34.5)
MCV RBC AUTO: 88.7 FL (ref 80–99.9)
NITRITE UR QL STRIP: NEGATIVE
PH UR STRIP: 6 [PH] (ref 5–9)
PLATELET # BLD AUTO: 276 E9/L (ref 130–450)
PMV BLD AUTO: 9.8 FL (ref 7–12)
POTASSIUM SERPL-SCNC: 3.9 MMOL/L (ref 3.5–5)
POTASSIUM SERPL-SCNC: 4 MMOL/L (ref 3.5–5)
PROT SERPL-MCNC: 7.7 G/DL (ref 6.4–8.3)
PROT UR STRIP-MCNC: ABNORMAL MG/DL
RBC # BLD AUTO: 5.75 E12/L (ref 3.5–5.5)
RBC #/AREA URNS HPF: ABNORMAL /HPF (ref 0–2)
REASON FOR REJECTION: NORMAL
REJECTED TEST: NORMAL
SODIUM SERPL-SCNC: 138 MMOL/L (ref 132–146)
SODIUM SERPL-SCNC: 139 MMOL/L (ref 132–146)
SP GR UR STRIP: >=1.03 (ref 1–1.03)
UROBILINOGEN UR STRIP-ACNC: 1 E.U./DL
WBC # BLD: 9.5 E9/L (ref 4.5–11.5)
WBC #/AREA URNS HPF: ABNORMAL /HPF (ref 0–5)

## 2023-04-20 PROCEDURE — 80053 COMPREHEN METABOLIC PANEL: CPT

## 2023-04-20 PROCEDURE — 83690 ASSAY OF LIPASE: CPT

## 2023-04-20 PROCEDURE — 2580000003 HC RX 258: Performed by: NURSE PRACTITIONER

## 2023-04-20 PROCEDURE — 6370000000 HC RX 637 (ALT 250 FOR IP): Performed by: EMERGENCY MEDICINE

## 2023-04-20 PROCEDURE — 81001 URINALYSIS AUTO W/SCOPE: CPT

## 2023-04-20 PROCEDURE — 99284 EMERGENCY DEPT VISIT MOD MDM: CPT

## 2023-04-20 PROCEDURE — 74022 RADEX COMPL AQT ABD SERIES: CPT

## 2023-04-20 PROCEDURE — 6360000002 HC RX W HCPCS: Performed by: EMERGENCY MEDICINE

## 2023-04-20 PROCEDURE — 36415 COLL VENOUS BLD VENIPUNCTURE: CPT

## 2023-04-20 PROCEDURE — 80048 BASIC METABOLIC PNL TOTAL CA: CPT

## 2023-04-20 PROCEDURE — 83605 ASSAY OF LACTIC ACID: CPT

## 2023-04-20 PROCEDURE — 85027 COMPLETE CBC AUTOMATED: CPT

## 2023-04-20 PROCEDURE — 96372 THER/PROPH/DIAG INJ SC/IM: CPT

## 2023-04-20 RX ORDER — 0.9 % SODIUM CHLORIDE 0.9 %
500 INTRAVENOUS SOLUTION INTRAVENOUS ONCE
Status: COMPLETED | OUTPATIENT
Start: 2023-04-20 | End: 2023-04-21

## 2023-04-20 RX ORDER — DIAZEPAM 5 MG/1
10 TABLET ORAL ONCE
Status: COMPLETED | OUTPATIENT
Start: 2023-04-20 | End: 2023-04-20

## 2023-04-20 RX ORDER — METOCLOPRAMIDE 10 MG/1
10 TABLET ORAL EVERY 6 HOURS PRN
Qty: 30 TABLET | Refills: 1 | Status: SHIPPED | OUTPATIENT
Start: 2023-04-20

## 2023-04-20 RX ADMIN — DIAZEPAM 10 MG: 5 TABLET ORAL at 20:09

## 2023-04-20 RX ADMIN — TRIMETHOBENZAMIDE HYDROCHLORIDE 200 MG: 100 INJECTION INTRAMUSCULAR at 20:09

## 2023-04-20 RX ADMIN — SODIUM CHLORIDE 500 ML: 9 INJECTION, SOLUTION INTRAVENOUS at 18:00

## 2023-04-20 ASSESSMENT — LIFESTYLE VARIABLES: HOW OFTEN DO YOU HAVE A DRINK CONTAINING ALCOHOL: NEVER

## 2023-04-21 VITALS
RESPIRATION RATE: 16 BRPM | TEMPERATURE: 98.5 F | WEIGHT: 155 LBS | HEIGHT: 62 IN | HEART RATE: 86 BPM | OXYGEN SATURATION: 98 % | DIASTOLIC BLOOD PRESSURE: 64 MMHG | BODY MASS INDEX: 28.52 KG/M2 | SYSTOLIC BLOOD PRESSURE: 112 MMHG

## 2023-06-08 NOTE — ED NOTES
Remainder of documentation was completed during downtime, please see paper chart     Hemlaatha Mott RN  07/13/19 7204 <-- Click to add NO pertinent Past Medical History

## 2023-07-20 ENCOUNTER — CLINICAL DOCUMENTATION (OUTPATIENT)
Dept: NUTRITION | Age: 68
End: 2023-07-20

## 2023-07-20 NOTE — PROGRESS NOTES
Initial Assessment      Date: 23   Time: 10:00 am   Patient Name: Pamela Landeros   Referring Clinician: Macario Lambert III, DO   Fax: [phone]   Reason for Visit: high cholesterol, weight reduction    Goals:  Smart goals: Will eat a more balanced dinner at least 3-4 days a week  Will drink at least 4 bottles water/day at least 3-4 days a week    Current Eating Pattern:    Tuesday: (states she was very stressed & nauseous)  2 ritz crackers  mashed potatoes,   1/2 slice [place] toast  Fluids: ~30oz.  water    Wednesday: (less stressed per patient)  Cheerios with black raspberries  Kit jeff bar  Cashews  Lunch: pasta salad w/ veggies in it  4 tory kisses  Dinner: chicken, mashed potatoes, zucchini  1/2 muffin  Fluids: ~45oz water    Eating out: rarely  Pre-made meals: rarely    Past Nutrition Hx:  No specific diets in the past    Allergies/Food Sensitivities:   N/a     Dietary Limitations/Time/Prep Issues:  Struggles with cooking balanced meals for one person    Stress/Environment Issues that may be affecting po intake:  yes    Work:  retired    [employer] Hx:  Hasn't noticed significant weight changes    Sex: female  Age: 79  Ht: 5'2\"  CBW: 171.2# actual 23  BMI: 31.3    Medical Hx:  Past Medical History:   Diagnosis Date    Allergic rhinitis     Anxiety     Asymptomatic gallstones     B12 deficiency 05/10/2020    Hemangioma of liver     Hernia, hiatal     Intractable vomiting with nausea 2020    Kidney stone     Renal mass, right     Exophytic 11 x 6 mm    Right bundle branch block     Skin cancer (melanoma) ([address]) 2019    sugically removed from right upper arm          Family Hx:  Family History   Problem Relation Age of Onset    Other Mother         Mother  age 80; many prescribed medication addictions    Cancer Father          age 80, lymphoma    High Cholesterol Sister         Medications:  Current Outpatient Medications on File Prior to Visit   Medication Sig Dispense Refill

## 2023-09-06 ENCOUNTER — CLINICAL DOCUMENTATION (OUTPATIENT)
Dept: NUTRITION | Age: 68
End: 2023-09-06

## 2023-09-06 NOTE — PROGRESS NOTES
Nutrition Follow Up Note    Date: 9/6/2023  Patient: Radha Salgado. Josselin  Referring Clinician: Cristi Barthel, III, DO  Fax: [phone]   Patient's Last Session: 7/20/23  Reason for Visit: high cholesterol, weight reduction    Progress with SMART goals: Will eat a more balanced dinner at least 3-4 days a week - 75%  Will drink at least 4 bottles water/day at least 3-4 days a week - 50%    Current eating pattern:   24 hr recall:  8/17:  Breakfast: low fat yogurt w/ 1 white peach  Lunch: red beans and rice (box mix)  Dinner: taco salad w/ [place] dressing, doritos, cheese, little bit of low fat sour cream  Snack: bowl cheerios w/ 2% milk  Water: 38oz    8/14:   1/2 english muffin w/ honey  6 ritz crackers, pepperoni cheese  Tostitos w/ taco meat, cheddar, little sour cream  Small ice cream cone  Water: 46 oz. Sleep:  States she is up multiple times at night/poor sleep quality  Wants to look into getting a sleep study    Last visit weight: 171.2# actual  CBW: 178.2# actual  Height: 5'2\"    Handouts given:   Building a balanced plate, food journal    Notes:   Patient has been keeping a food journal and has been making some positive changes, such as focusing on snacks (flip crackers in place of chocolate, or dark chocolate instead of milk chocolate). She states she was on vacation recently and had a wedding, [facility], so her eating habits have been slightly different than usual, however has been overall trying to be more mindful of what her goals are. She states she really struggles with good quality sleep and is interested in getting a sleep study done. She reports she wakes up frequently during the night and her mind races, making is hard to fall back asleep. Encouraged patient to try writing in journal or asking her doctor about starting a magnesium supplement. We reviewed portion sizes, meal planning tips, how to build a balanced plate and provided patient with balanced meal/snack examples.  All

## 2023-10-18 ENCOUNTER — CLINICAL DOCUMENTATION (OUTPATIENT)
Dept: NUTRITION | Age: 68
End: 2023-10-18

## 2023-10-18 NOTE — PROGRESS NOTES
Patient states she wants to try making smoothies in the morning and we discussed using fruit, greek yogurt or protein powder. Provided coupons of orgain protein powder. All questions were answered and patient voiced understanding of information/plan. Updated plan:   Patient is going to work on increasing her physical activity and continue working on eating balanced breakfasts. Motivation level over course of our last session: 9   Motivation level at the end of the session: 9     Smart Goals:    Will eat a balanced breakfast at least 3 days a week  Will exercise 1-2 times per week (virtual classes)    Follow up plan:   November 15 at 10:00 am     Electronically signed by: Miya Guthrie, MS, RD, LD on 10/18/23 at 12:55 PM EDT
DC by MD Croft/SANYA instructions

## 2023-11-03 RX ORDER — GUAIFENESIN 600 MG/1
TABLET, EXTENDED RELEASE ORAL
Qty: 30 TABLET | Refills: 0 | OUTPATIENT
Start: 2023-11-03

## 2024-09-18 NOTE — PROGRESS NOTES
05/10/2020    K 3.7 12/10/2019     05/10/2020    CO2 24 05/10/2020    BUN 13 05/10/2020    LABALBU 3.8 05/10/2020    CREATININE 0.8 05/10/2020    CALCIUM 9.2 05/10/2020    GFRAA >60 05/10/2020    LABGLOM >60 05/10/2020    GLUCOSE 93 05/10/2020     Hepatic Function Panel:    Lab Results   Component Value Date    ALKPHOS 51 05/10/2020    ALT 15 05/10/2020    AST 13 05/10/2020    PROT 6.0 05/10/2020    BILITOT 0.8 05/10/2020    BILIDIR <0.2 05/10/2020    IBILI see below 05/10/2020    LABALBU 3.8 05/10/2020     Magnesium:    Lab Results   Component Value Date    MG 2.4 05/10/2020     Phosphorus:    Lab Results   Component Value Date    PHOS 3.5 05/10/2020     LDH:  No results found for: LDH  Uric Acid:  No results found for: Julia Press  PT/INR:  No results found for: PROTIME, INR  Warfarin PT/INR:  No components found for: PTPATWAR, PTINRWAR  PTT:  No results found for: APTT, PTT[APTT}  Troponin:    Lab Results   Component Value Date    TROPONINI <0.01 05/08/2020     Last 3 Troponin:    Lab Results   Component Value Date    TROPONINI <0.01 05/08/2020    TROPONINI <0.01 12/10/2019    TROPONINI <0.01 07/12/2019     U/A:    Lab Results   Component Value Date    COLORU Yellow 05/09/2020    PROTEINU Negative 05/09/2020    PHUR 6.5 05/09/2020    WBCUA 2-5 05/09/2020    RBCUA 2-5 05/09/2020    BACTERIA RARE 05/09/2020    CLARITYU Clear 05/09/2020    SPECGRAV <=1.005 05/09/2020    LEUKOCYTESUR SMALL 05/09/2020    UROBILINOGEN 0.2 05/09/2020    BILIRUBINUR Negative 05/09/2020    BLOODU SMALL 05/09/2020    GLUCOSEU Negative 05/09/2020     HgBA1c:    Lab Results   Component Value Date    LABA1C 4.7 05/10/2020     FLP:  No results found for: TRIG, HDL, LDLCALC, LDLDIRECT, LABVLDL  TSH:  No results found for: TSH  VITAMIN B12: No components found for: B12  FOLATE:    Lab Results   Component Value Date    FOLATE 16.7 05/10/2020     AMYLASE:    Lab Results   Component Value Date    AMYLASE 50 05/10/2020     LIPASE:    Lab Results   Component Value Date    LIPASE 31 05/10/2020        CBC:  Recent Labs     05/08/20  2313 05/10/20  0414   WBC 10.0 5.4   RBC 5.33 4.39   HGB 16.4* 13.6   HCT 46.9 40.6    221   MCV 88.0 92.5   MCH 30.8 31.0   MCHC 35.0* 33.5   RDW 11.9 12.3   LYMPHOPCT 11.3*  --    MONOPCT 4.8  --    BASOPCT 0.2  --    MONOSABS 0.48  --    LYMPHSABS 1.13*  --    EOSABS 0.00*  --    BASOSABS 0.02  --           H & H :  Recent Labs     05/08/20  2313 05/10/20  0414   HGB 16.4* 13.6       TSH:No results for input(s): TSH in the last 72 hours. GLUCOSE:No results for input(s): POCGLU in the last 72 hours. CMP:  Recent Labs     05/08/20  2313 05/10/20  0414    139   K 4.0 3.6    105   CO2 20* 24   BUN 11 13   CREATININE 0.7 0.8   GFRAA >60 >60   LABGLOM >60 >60   GLUCOSE 129* 93   PROT 7.3 6.0*   LABALBU 4.7 3.8   CALCIUM 10.5* 9.2   BILITOT 1.2 0.8   ALKPHOS 68 51   AST 16 13   ALT 18 15        BNP:No results found for: BNP    PROTIME/INR:No results for input(s): PROTIME, INR in the last 72 hours. CRP: No results for input(s): CRP in the last 72 hours. ESR:  Recent Labs     05/10/20  0414   SEDRATE 5       LIPASE , AMYLASE:  Lab Results   Component Value Date    LIPASE 31 05/10/2020      Lab Results   Component Value Date    AMYLASE 50 05/10/2020       ABGs: No results found for: PH, PO2, PCO2    CARDIAC:   Recent Labs     05/08/20 2313   TROPONINI <0.01       Lipid Profile: No results found for: TRIG, HDL, LDLCALC, CHOL     Lab Results   Component Value Date    LABA1C 4.7 05/10/2020            RADIOLOGY: REVIEWED AVAILABLE REPORT  CT ABDOMEN PELVIS W IV CONTRAST Additional Contrast? None   Final Result   1. No acute inflammatory changes omental mesenteric fat planes, free   intraperitoneal air, ascites or indication for bowel obstruction. 2. Presence of multiple gallstones. 3. No dilatation biliary tree or pancreatic ductal system. 4. No obstructive uropathy.       5. Suspicious Regular Diet - No restrictions

## (undated) DEVICE — NEEDLE CLOSURE OMNICLOSE

## (undated) DEVICE — INSUFFLATION NEEDLE TO ESTABLISH PNEUMOPERITONEUM.: Brand: INSUFFLATION NEEDLE

## (undated) DEVICE — DRAIN INCISION 15FR SILICONE HUBLESS

## (undated) DEVICE — APPLICATOR LAP 35 CM 2 RIGID VISTASEAL

## (undated) DEVICE — CLOTH SURG PREP PREOPERATIVE CHLORHEXIDINE GLUC 2% READYPREP

## (undated) DEVICE — ANCHOR TISSUE RETRIEVAL SYSTEM, BAG SIZE 175 ML, PORT SIZE 10 MM: Brand: ANCHOR TISSUE RETRIEVAL SYSTEM

## (undated) DEVICE — SCISSORS SURG DIA8MM MPLR CRV ENDOWRIST

## (undated) DEVICE — BASIC SINGLE BASIN 1-LF: Brand: MEDLINE INDUSTRIES, INC.

## (undated) DEVICE — COLUMN DRAPE

## (undated) DEVICE — TIBURON GENERAL ENDOSCOPY DRAPE: Brand: CONVERTORS

## (undated) DEVICE — CANNULA SEAL

## (undated) DEVICE — SET INST DAVINCI LAP

## (undated) DEVICE — DOUBLE BASIN SET: Brand: MEDLINE INDUSTRIES, INC.

## (undated) DEVICE — Z DUP USE 2641840 CLIP INT L POLYMER LOK LIG HEM O LOK

## (undated) DEVICE — SURGICAL PROCEDURE PACK ROBOTIC

## (undated) DEVICE — TIP APPL L35CM RIG FOR SEAL EVICEL

## (undated) DEVICE — Z INACTIVE USE 2641839 CLIP INT M L POLYMER LOK LIG HEM O LOK

## (undated) DEVICE — TIP COVER ACCESSORY

## (undated) DEVICE — SET SURG INSTR GENITOURINARY STGU1

## (undated) DEVICE — 1.5L THIN WALL CAN: Brand: CRD

## (undated) DEVICE — GLOVE SURG SZ 75 L12IN FNGR THK94MIL TRNSLUC YEL LTX

## (undated) DEVICE — GLOVE SURG SZ 75 STD WHT LTX SYN POLYMER BEAD REINF ANTI RL

## (undated) DEVICE — ELECTRODE PT RET AD L9FT HI MOIST COND ADH HYDRGEL CORDED

## (undated) DEVICE — SCOPE DAVINCI XI 30 DEG W/CORD

## (undated) DEVICE — MARYLAND BIPOLAR FORCEPS: Brand: ENDOWRIST

## (undated) DEVICE — ARM DRAPE

## (undated) DEVICE — TOWEL,OR,DSP,ST,BLUE,STD,6/PK,12PK/CS: Brand: MEDLINE

## (undated) DEVICE — SPONGE,DRAIN,NONWVN,4"X4",6PLY,STRL,LF: Brand: MEDLINE

## (undated) DEVICE — PAD BD CONVOLUTED FOAM

## (undated) DEVICE — LOOP VES W25MM THK1MM MAXI RED SIL FLD REPELLENT 100 PER

## (undated) DEVICE — 3M™ IOBAN™ 2 ANTIMICROBIAL INCISE DRAPE 6650EZ: Brand: IOBAN™ 2

## (undated) DEVICE — MEDIUM-LARGE CLIP APPLIER: Brand: ENDOWRIST

## (undated) DEVICE — SOLUTION IV 1000ML 0.9% SOD CHL PH 5 INJ USP VIAFLX PLAS

## (undated) DEVICE — GOWN,SIRUS,FABRNF,XL,20/CS: Brand: MEDLINE

## (undated) DEVICE — TOTAL TRAY, DB, 100% SILI FOLEY, 16FR 10: Brand: MEDLINE

## (undated) DEVICE — BITEBLOCK 54FR W/ DENT RIM BLOX

## (undated) DEVICE — Device: Brand: BALLOON3

## (undated) DEVICE — SET INST DAVINCI ACCESSORIES

## (undated) DEVICE — TROCAR: Brand: KII FIOS FIRST ENTRY

## (undated) DEVICE — SOLUTION IV IRRIG POUR BRL 0.9% SODIUM CHL 2F7124

## (undated) DEVICE — NEEDLE HYPO 21GA L1.5IN GRN POLYPR HUB S STL REG BVL STR

## (undated) DEVICE — SOLUTION IV IRRIG WATER 1000ML POUR BRL 2F7114

## (undated) DEVICE — Device

## (undated) DEVICE — SUCTION IRRIGATOR: Brand: ENDOWRIST

## (undated) DEVICE — BLADELESS OBTURATOR: Brand: WECK VISTA

## (undated) DEVICE — TUBING INSUFFLATION CAP W/ EXT CARBON DIOX ENDO SMARTCAP

## (undated) DEVICE — APPLICATOR PREP 26ML 0.7% IOD POVACRYLEX 74% ISO ALC ST

## (undated) DEVICE — APPLICATOR MEDICATED 26 CC SOLUTION HI LT ORNG CHLORAPREP

## (undated) DEVICE — LARGE NEEDLE DRIVER: Brand: ENDOWRIST

## (undated) DEVICE — PROGRASP FORCEPS: Brand: ENDOWRIST

## (undated) DEVICE — CLIP INT XL YEL POLYMER HEM-O-LOK WECK

## (undated) DEVICE — BLADELESS OBTURATOR, LONG: Brand: WECK VISTA

## (undated) DEVICE — AGENT HEMSTAT W4XL8IN OXIDIZED REGENERATED CELOS ABSRB

## (undated) DEVICE — SET INST DAVINCI HEMLOCK APPLIERS

## (undated) DEVICE — PUMP SUC IRR TBNG L10FT W/ HNDPC ASSEMB STRYKEFLOW 2

## (undated) DEVICE — ADHESIVE SKIN CLSR 0.7ML TOP DERMBND ADV

## (undated) DEVICE — Z DUP USE 2257490 ADHESIVE SKIN CLSRE 036ML TPCL 2CTL CNCRLTE HIGH VSCSTY DRMB

## (undated) DEVICE — DRAPE,LAP,CHOLE,W/TROUGHS,STERILE: Brand: MEDLINE

## (undated) DEVICE — SET SURG INSTR GENITOURINARY STGU2

## (undated) DEVICE — SYRINGE 20ML LL S/C 50

## (undated) DEVICE — AIRSEAL 12MM ACCESS PORT AND OBTURATOR WITH BLADELESS OPTICAL TIP, 150MM LENGTH: Brand: AIRSEAL

## (undated) DEVICE — GARMENT,MEDLINE,DVT,INT,CALF,MED, GEN2: Brand: MEDLINE

## (undated) DEVICE — TUBING, SUCTION, 3/16" X 12', STRAIGHT: Brand: MEDLINE

## (undated) DEVICE — PERMANENT CAUTERY HOOK: Brand: ENDOWRIST

## (undated) DEVICE — GAUZE,SPONGE,POST-OP,4X3,STRL,LF: Brand: MEDLINE

## (undated) DEVICE — INTENDED FOR TISSUE SEPARATION, AND OTHER PROCEDURES THAT REQUIRE A SHARP SURGICAL BLADE TO PUNCTURE OR CUT.: Brand: BARD-PARKER ® STAINLESS STEEL BLADES

## (undated) DEVICE — KIT,ANTI FOG,W/SPONGE & FLUID,SOFT PACK: Brand: MEDLINE

## (undated) DEVICE — PATIENT RETURN ELECTRODE, SINGLE-USE, CONTACT QUALITY MONITORING, ADULT, WITH 9FT CORD, FOR PATIENTS WEIGING OVER 33LBS. (15KG): Brand: MEGADYNE

## (undated) DEVICE — TRI-LUMEN FILTERED TUBE SET WITH ACTIVATED CHARCOAL FILTER: Brand: AIRSEAL

## (undated) DEVICE — WARMER SCP LAP

## (undated) DEVICE — AGENT HEMSTAT W4XL4IN OXIDIZED REGENERATED CELOS STRUCTURED